# Patient Record
Sex: FEMALE | Race: OTHER | Employment: FULL TIME | URBAN - METROPOLITAN AREA
[De-identification: names, ages, dates, MRNs, and addresses within clinical notes are randomized per-mention and may not be internally consistent; named-entity substitution may affect disease eponyms.]

---

## 2017-01-30 ENCOUNTER — TELEPHONE (OUTPATIENT)
Dept: OBGYN CLINIC | Facility: CLINIC | Age: 27
End: 2017-01-30

## 2017-01-30 NOTE — TELEPHONE ENCOUNTER
Left message on answering machine to call back. Pt should schedule appt with Liborio Rogers within next few days.

## 2017-01-30 NOTE — TELEPHONE ENCOUNTER
Pt c/o menses lasting greater than one month; changing tampon q 2 hours during the day. Pt c/o mild dizziness today; denies chest pain, shortness of breath or headache. Pt states menses has always been irregular; had IUD, but fell out in April 2016.   Not

## 2017-02-02 ENCOUNTER — LAB ENCOUNTER (OUTPATIENT)
Dept: LAB | Age: 27
End: 2017-02-02
Attending: NURSE PRACTITIONER
Payer: COMMERCIAL

## 2017-02-02 ENCOUNTER — OFFICE VISIT (OUTPATIENT)
Dept: OBGYN CLINIC | Facility: CLINIC | Age: 27
End: 2017-02-02

## 2017-02-02 VITALS
HEIGHT: 63 IN | DIASTOLIC BLOOD PRESSURE: 74 MMHG | HEART RATE: 60 BPM | SYSTOLIC BLOOD PRESSURE: 102 MMHG | BODY MASS INDEX: 31.54 KG/M2 | WEIGHT: 178 LBS

## 2017-02-02 DIAGNOSIS — N93.8 DYSFUNCTIONAL UTERINE BLEEDING: ICD-10-CM

## 2017-02-02 DIAGNOSIS — N93.8 DYSFUNCTIONAL UTERINE BLEEDING: Primary | ICD-10-CM

## 2017-02-02 LAB
BASOPHILS # BLD AUTO: 0.02 X10(3) UL (ref 0–0.1)
BASOPHILS NFR BLD AUTO: 0.2 %
EOSINOPHIL # BLD AUTO: 0.08 X10(3) UL (ref 0–0.3)
EOSINOPHIL NFR BLD AUTO: 0.9 %
ERYTHROCYTE [DISTWIDTH] IN BLOOD BY AUTOMATED COUNT: 12.5 % (ref 11.5–16)
FREE T4: 1.2 NG/DL (ref 0.9–1.8)
HCG QUANTITATIVE: <1 MIU/ML (ref ?–1)
HCT VFR BLD AUTO: 39.4 % (ref 34–50)
HGB BLD-MCNC: 13.3 G/DL (ref 12–16)
IMMATURE GRANULOCYTE COUNT: 0.03 X10(3) UL (ref 0–1)
IMMATURE GRANULOCYTE RATIO %: 0.3 %
LYMPHOCYTES # BLD AUTO: 2.26 X10(3) UL (ref 0.9–4)
LYMPHOCYTES NFR BLD AUTO: 24 %
MCH RBC QN AUTO: 30.3 PG (ref 27–33.2)
MCHC RBC AUTO-ENTMCNC: 33.8 G/DL (ref 31–37)
MCV RBC AUTO: 89.7 FL (ref 81–100)
MONOCYTES # BLD AUTO: 0.71 X10(3) UL (ref 0.1–0.6)
MONOCYTES NFR BLD AUTO: 7.5 %
NEUTROPHIL ABS PRELIM: 6.31 X10 (3) UL (ref 1.3–6.7)
NEUTROPHILS # BLD AUTO: 6.31 X10(3) UL (ref 1.3–6.7)
NEUTROPHILS NFR BLD AUTO: 67.1 %
PLATELET # BLD AUTO: 305 10(3)UL (ref 150–450)
RBC # BLD AUTO: 4.39 X10(6)UL (ref 3.8–5.1)
RED CELL DISTRIBUTION WIDTH-SD: 41.7 FL (ref 35.1–46.3)
TSI SER-ACNC: 0.65 MIU/ML (ref 0.35–5.5)
WBC # BLD AUTO: 9.4 X10(3) UL (ref 4–13)

## 2017-02-02 PROCEDURE — 36415 COLL VENOUS BLD VENIPUNCTURE: CPT

## 2017-02-02 PROCEDURE — 84443 ASSAY THYROID STIM HORMONE: CPT

## 2017-02-02 PROCEDURE — 84439 ASSAY OF FREE THYROXINE: CPT

## 2017-02-02 PROCEDURE — 85025 COMPLETE CBC W/AUTO DIFF WBC: CPT

## 2017-02-02 PROCEDURE — 84702 CHORIONIC GONADOTROPIN TEST: CPT

## 2017-02-02 PROCEDURE — 99213 OFFICE O/P EST LOW 20 MIN: CPT | Performed by: NURSE PRACTITIONER

## 2017-02-02 RX ORDER — MEDROXYPROGESTERONE ACETATE 10 MG/1
10 TABLET ORAL DAILY
Qty: 15 TABLET | Refills: 0 | Status: SHIPPED | OUTPATIENT
Start: 2017-02-02 | End: 2017-02-17

## 2017-02-03 NOTE — PROGRESS NOTES
Quick Note:    Left message on patients confidential voice mail all labs are normal. If patient symptomatic, should call PCP for further work up as no anemia noted on labs. Will see patient back for follow- up appointment or sooner if needed.   ______

## 2017-02-03 NOTE — PROGRESS NOTES
S:  Patient presents with 8 weeks of continuous bleeding, fluctuating in flow. No significant pain to note. Patient had 1 previous episode of prolonged bleeding in 2015, which was around the same time diagnosed with hypothyroidism.  Menses has been irregula

## 2017-05-15 PROBLEM — R93.89 THYROID WITH HETEROGENEOUS ECHOTEXTURE DETERMINED BY ULTRASOUND: Status: ACTIVE | Noted: 2017-05-15

## 2017-07-25 ENCOUNTER — TELEPHONE (OUTPATIENT)
Dept: INTERNAL MEDICINE CLINIC | Facility: CLINIC | Age: 27
End: 2017-07-25

## 2017-07-25 DIAGNOSIS — Z11.1 SCREENING-PULMONARY TB: Primary | ICD-10-CM

## 2017-07-25 NOTE — TELEPHONE ENCOUNTER
Order is in. Jyl Aid. Reanna Marcano MD  Diplomate, American Board of Internal Medicine  [facility]  [address], [address], [facility], [address] [place] [address]  T: [phone]; F: Emily 5

## 2017-07-26 ENCOUNTER — NURSE ONLY (OUTPATIENT)
Dept: INTERNAL MEDICINE CLINIC | Facility: CLINIC | Age: 27
End: 2017-07-26

## 2017-07-26 PROCEDURE — 86580 TB INTRADERMAL TEST: CPT | Performed by: INTERNAL MEDICINE

## 2017-07-26 NOTE — PROGRESS NOTES
Pt here for TB test for school. No form given just need to print out Immunization record after reading results.

## 2017-07-28 ENCOUNTER — NURSE ONLY (OUTPATIENT)
Dept: INTERNAL MEDICINE CLINIC | Facility: CLINIC | Age: 27
End: 2017-07-28

## 2017-07-28 LAB — INDURATION (): 0 MM (ref 0–11)

## 2017-08-03 ENCOUNTER — OFFICE VISIT (OUTPATIENT)
Dept: FAMILY MEDICINE CLINIC | Facility: CLINIC | Age: 27
End: 2017-08-03

## 2017-08-03 DIAGNOSIS — Z11.1 PPD SCREENING TEST: Primary | ICD-10-CM

## 2017-08-03 PROCEDURE — 86580 TB INTRADERMAL TEST: CPT | Performed by: PHYSICIAN ASSISTANT

## 2017-08-03 NOTE — PROGRESS NOTES
Cy Middleton is a 33year old female who presents for TB testing. TUBERCULOSIS SCREENING QUESTIONNAIRE    · Live vaccines in the past month? no  · Any steroid medication in the past month? no  · History of BCG vaccine?     no  · If female, are you

## 2017-08-06 ENCOUNTER — OFFICE VISIT (OUTPATIENT)
Dept: FAMILY MEDICINE CLINIC | Facility: CLINIC | Age: 27
End: 2017-08-06

## 2017-08-06 DIAGNOSIS — Z11.1 ENCOUNTER FOR PPD SKIN TEST READING: Primary | ICD-10-CM

## 2017-08-06 LAB — INDURATION (): 0 MM (ref 0–11)

## 2017-08-06 NOTE — PROGRESS NOTES
08/06/17    Shawanda Hernandez  6/23/1990        This document is to verify Tito Millan had a TB skin test preformed and the results were: negative.     Date given: 08/03/2017  Date read: 08/06/17

## 2017-08-09 ENCOUNTER — HOSPITAL ENCOUNTER (OUTPATIENT)
Dept: ULTRASOUND IMAGING | Age: 27
Discharge: HOME | End: 2017-08-09
Attending: NURSE PRACTITIONER
Payer: COMMERCIAL

## 2017-08-09 ENCOUNTER — OFFICE VISIT (OUTPATIENT)
Dept: OBGYN CLINIC | Facility: CLINIC | Age: 27
End: 2017-08-09

## 2017-08-09 ENCOUNTER — TELEPHONE (OUTPATIENT)
Dept: OBGYN CLINIC | Facility: CLINIC | Age: 27
End: 2017-08-09

## 2017-08-09 VITALS
HEIGHT: 63 IN | WEIGHT: 176 LBS | SYSTOLIC BLOOD PRESSURE: 100 MMHG | DIASTOLIC BLOOD PRESSURE: 62 MMHG | BODY MASS INDEX: 31.18 KG/M2

## 2017-08-09 DIAGNOSIS — N92.6 IRREGULAR MENSES: ICD-10-CM

## 2017-08-09 DIAGNOSIS — Z13.220 LIPID SCREENING: ICD-10-CM

## 2017-08-09 DIAGNOSIS — L68.0 HIRSUTISM: ICD-10-CM

## 2017-08-09 DIAGNOSIS — N93.8 DYSFUNCTIONAL UTERINE BLEEDING: ICD-10-CM

## 2017-08-09 DIAGNOSIS — Z01.419 WELL WOMAN EXAM WITH ROUTINE GYNECOLOGICAL EXAM: Primary | ICD-10-CM

## 2017-08-09 PROCEDURE — 76830 TRANSVAGINAL US NON-OB: CPT | Performed by: NURSE PRACTITIONER

## 2017-08-09 PROCEDURE — 99395 PREV VISIT EST AGE 18-39: CPT | Performed by: NURSE PRACTITIONER

## 2017-08-09 PROCEDURE — 76856 US EXAM PELVIC COMPLETE: CPT | Performed by: NURSE PRACTITIONER

## 2017-08-09 NOTE — PROGRESS NOTES
Here for Routine Annual Exam  No concerns or questions. Menses continue to be irregular, skip them and had a 2 week cycle in June. Interested in work up, never did US as recommended in February. Contraception- none at this time.   Does note increased acne

## 2017-09-14 ENCOUNTER — LAB ENCOUNTER (OUTPATIENT)
Dept: LAB | Age: 27
End: 2017-09-14
Attending: NURSE PRACTITIONER
Payer: COMMERCIAL

## 2017-09-14 DIAGNOSIS — Z13.220 LIPID SCREENING: ICD-10-CM

## 2017-09-14 DIAGNOSIS — L68.0 HIRSUTISM: ICD-10-CM

## 2017-09-14 DIAGNOSIS — N92.6 IRREGULAR MENSES: ICD-10-CM

## 2017-09-14 LAB
ALBUMIN SERPL-MCNC: 3.9 G/DL (ref 3.5–4.8)
ALP LIVER SERPL-CCNC: 74 U/L (ref 37–98)
ALT SERPL-CCNC: 37 U/L (ref 14–54)
AST SERPL-CCNC: 20 U/L (ref 15–41)
BILIRUB SERPL-MCNC: 0.5 MG/DL (ref 0.1–2)
BUN BLD-MCNC: 9 MG/DL (ref 8–20)
CALCIUM BLD-MCNC: 8.8 MG/DL (ref 8.3–10.3)
CHLORIDE: 107 MMOL/L (ref 101–111)
CHOLEST SMN-MCNC: 175 MG/DL (ref ?–200)
CO2: 24 MMOL/L (ref 22–32)
CREAT BLD-MCNC: 0.69 MG/DL (ref 0.55–1.02)
ESTRADIOL: 48.8 PG/ML
FSH: 6.6 MIU/ML
GLUCOSE BLD-MCNC: 85 MG/DL (ref 70–99)
HDLC SERPL-MCNC: 40 MG/DL (ref 45–?)
HDLC SERPL: 4.38 {RATIO} (ref ?–4.44)
INSULIN: 20.9 MU/L (ref 1.7–31)
LDLC SERPL CALC-MCNC: 113 MG/DL (ref ?–130)
LDLC SERPL-MCNC: 22 MG/DL (ref 5–40)
LH: 7.6 MIU/ML
M PROTEIN MFR SERPL ELPH: 7.9 G/DL (ref 6.1–8.3)
NONHDLC SERPL-MCNC: 135 MG/DL (ref ?–130)
POTASSIUM SERPL-SCNC: 3.8 MMOL/L (ref 3.6–5.1)
PROLACTIN: 20.8 NG/ML
SODIUM SERPL-SCNC: 139 MMOL/L (ref 136–144)
TRIGLYCERIDES: 112 MG/DL (ref ?–150)

## 2017-09-14 PROCEDURE — 80061 LIPID PANEL: CPT

## 2017-09-14 PROCEDURE — 36415 COLL VENOUS BLD VENIPUNCTURE: CPT

## 2017-09-14 PROCEDURE — 83002 ASSAY OF GONADOTROPIN (LH): CPT

## 2017-09-14 PROCEDURE — 82627 DEHYDROEPIANDROSTERONE: CPT

## 2017-09-14 PROCEDURE — 83525 ASSAY OF INSULIN: CPT

## 2017-09-14 PROCEDURE — 84402 ASSAY OF FREE TESTOSTERONE: CPT

## 2017-09-14 PROCEDURE — 82670 ASSAY OF TOTAL ESTRADIOL: CPT

## 2017-09-14 PROCEDURE — 84403 ASSAY OF TOTAL TESTOSTERONE: CPT

## 2017-09-14 PROCEDURE — 83001 ASSAY OF GONADOTROPIN (FSH): CPT

## 2017-09-14 PROCEDURE — 80053 COMPREHEN METABOLIC PANEL: CPT

## 2017-09-14 PROCEDURE — 84146 ASSAY OF PROLACTIN: CPT

## 2017-09-16 LAB — DHEA SULFATE, SERUM: 274 UG/DL

## 2017-09-17 LAB
SEX HORMONE BINDING GLOBULIN: 41 NMOL/L
TESTOSTERONE -MS, BIOAVAILAB: 14.3 NG/DL
TESTOSTERONE, -MS/MS: 36 NG/DL
TESTOSTERONE, FREE -MS/MS: 5.4 PG/ML

## 2019-01-09 ENCOUNTER — OFFICE VISIT (OUTPATIENT)
Dept: INTERNAL MEDICINE CLINIC | Facility: CLINIC | Age: 29
End: 2019-01-09
Payer: COMMERCIAL

## 2019-01-09 ENCOUNTER — LAB ENCOUNTER (OUTPATIENT)
Dept: LAB | Age: 29
End: 2019-01-09
Attending: INTERNAL MEDICINE
Payer: COMMERCIAL

## 2019-01-09 VITALS
BODY MASS INDEX: 30.36 KG/M2 | TEMPERATURE: 98 F | WEIGHT: 173.5 LBS | HEIGHT: 63.5 IN | DIASTOLIC BLOOD PRESSURE: 76 MMHG | SYSTOLIC BLOOD PRESSURE: 114 MMHG | HEART RATE: 80 BPM | RESPIRATION RATE: 16 BRPM

## 2019-01-09 DIAGNOSIS — E06.3 CHRONIC LYMPHOCYTIC THYROIDITIS: ICD-10-CM

## 2019-01-09 DIAGNOSIS — R00.2 PALPITATIONS: Primary | ICD-10-CM

## 2019-01-09 DIAGNOSIS — L50.0 ALLERGIC URTICARIA: ICD-10-CM

## 2019-01-09 DIAGNOSIS — R00.2 PALPITATIONS: ICD-10-CM

## 2019-01-09 DIAGNOSIS — E06.3 HYPOTHYROIDISM, ACQUIRED, AUTOIMMUNE: ICD-10-CM

## 2019-01-09 LAB
ALBUMIN SERPL-MCNC: 3.9 G/DL (ref 3.1–4.5)
ALBUMIN/GLOB SERPL: 1.1 {RATIO} (ref 1–2)
ALP LIVER SERPL-CCNC: 77 U/L (ref 37–98)
ALT SERPL-CCNC: 38 U/L (ref 14–54)
ANION GAP SERPL CALC-SCNC: 6 MMOL/L (ref 0–18)
AST SERPL-CCNC: 25 U/L (ref 15–41)
BASOPHILS # BLD AUTO: 0.02 X10(3) UL (ref 0–0.1)
BASOPHILS NFR BLD AUTO: 0.3 %
BILIRUB SERPL-MCNC: 0.4 MG/DL (ref 0.1–2)
BUN BLD-MCNC: 8 MG/DL (ref 8–20)
BUN/CREAT SERPL: 11.3 (ref 10–20)
CALCIUM BLD-MCNC: 8.9 MG/DL (ref 8.3–10.3)
CHLORIDE SERPL-SCNC: 109 MMOL/L (ref 101–111)
CO2 SERPL-SCNC: 28 MMOL/L (ref 22–32)
CREAT BLD-MCNC: 0.71 MG/DL (ref 0.55–1.02)
EOSINOPHIL # BLD AUTO: 0.1 X10(3) UL (ref 0–0.3)
EOSINOPHIL NFR BLD AUTO: 1.4 %
ERYTHROCYTE [DISTWIDTH] IN BLOOD BY AUTOMATED COUNT: 12.8 % (ref 11.5–16)
GLOBULIN PLAS-MCNC: 3.6 G/DL (ref 2.8–4.4)
GLUCOSE BLD-MCNC: 111 MG/DL (ref 70–99)
HCT VFR BLD AUTO: 39.5 % (ref 34–50)
HGB BLD-MCNC: 13.5 G/DL (ref 12–16)
IMMATURE GRANULOCYTE COUNT: 0.01 X10(3) UL (ref 0–1)
IMMATURE GRANULOCYTE RATIO %: 0.1 %
LYMPHOCYTES # BLD AUTO: 2.36 X10(3) UL (ref 0.9–4)
LYMPHOCYTES NFR BLD AUTO: 32.6 %
M PROTEIN MFR SERPL ELPH: 7.5 G/DL (ref 6.4–8.2)
MCH RBC QN AUTO: 30.2 PG (ref 27–33.2)
MCHC RBC AUTO-ENTMCNC: 34.2 G/DL (ref 31–37)
MCV RBC AUTO: 88.4 FL (ref 81–100)
MONOCYTES # BLD AUTO: 0.62 X10(3) UL (ref 0.1–1)
MONOCYTES NFR BLD AUTO: 8.6 %
NEUTROPHIL ABS PRELIM: 4.12 X10 (3) UL (ref 1.3–6.7)
NEUTROPHILS # BLD AUTO: 4.12 X10(3) UL (ref 1.3–6.7)
NEUTROPHILS NFR BLD AUTO: 57 %
OSMOLALITY SERPL CALC.SUM OF ELEC: 295 MOSM/KG (ref 275–295)
PLATELET # BLD AUTO: 304 10(3)UL (ref 150–450)
POTASSIUM SERPL-SCNC: 4.1 MMOL/L (ref 3.6–5.1)
RBC # BLD AUTO: 4.47 X10(6)UL (ref 3.8–5.1)
RED CELL DISTRIBUTION WIDTH-SD: 41 FL (ref 35.1–46.3)
SODIUM SERPL-SCNC: 143 MMOL/L (ref 136–144)
TSI SER-ACNC: 0.85 MIU/ML (ref 0.35–5.5)
WBC # BLD AUTO: 7.2 X10(3) UL (ref 4–13)

## 2019-01-09 PROCEDURE — 36415 COLL VENOUS BLD VENIPUNCTURE: CPT | Performed by: INTERNAL MEDICINE

## 2019-01-09 PROCEDURE — 99214 OFFICE O/P EST MOD 30 MIN: CPT | Performed by: INTERNAL MEDICINE

## 2019-01-09 PROCEDURE — 80050 GENERAL HEALTH PANEL: CPT | Performed by: INTERNAL MEDICINE

## 2019-01-09 PROCEDURE — 93000 ELECTROCARDIOGRAM COMPLETE: CPT | Performed by: INTERNAL MEDICINE

## 2019-01-09 NOTE — PROGRESS NOTES
Imani Esqueda is a 33year old female. HPI:   Patient presents with:  Palpitations: x 1 week and a half. Last time this happened was when she was diagnosed with Hashimoto's  Patient presents with palpitations for the past 1.5 weeks.   Palpitations are oc smoked. she has never used smokeless tobacco. She reports that she does not drink alcohol or use drugs.   Wt Readings from Last 6 Encounters:  01/09/19 : 173 lb 8 oz  06/08/18 : 177 lb 11.2 oz  08/09/17 : 176 lb  05/15/17 : 176 lb  02/02/17 : 178 lb  10/29/ GYNECOLOGY)  The patient indicates understanding of these issues and agrees to the plan. The patient is asked to return to clinic in 6-12 months with Dr. Edgar Hadley MD for follow up on chronic issues, or earlier if acute issues arise.     Jennifer Everett,

## 2019-01-09 NOTE — PATIENT INSTRUCTIONS
- EKG was normal  - Get blood work done today  - If blood work is unremarkable, we will have you schedule a 48 hour Holter monitor placement ([phone]). It was a pleasure seeing you in the clinic today.   Thank you for choosing the [facility]

## 2019-03-08 ENCOUNTER — EMPLOYEE HEALTH (OUTPATIENT)
Dept: OCCUPATIONAL MEDICINE | Age: 29
End: 2019-03-08
Attending: PHYSICIAN ASSISTANT

## 2019-10-16 ENCOUNTER — OFFICE VISIT (OUTPATIENT)
Dept: INTERNAL MEDICINE CLINIC | Facility: CLINIC | Age: 29
End: 2019-10-16
Payer: COMMERCIAL

## 2019-10-16 VITALS
TEMPERATURE: 98 F | HEART RATE: 80 BPM | HEIGHT: 63.5 IN | DIASTOLIC BLOOD PRESSURE: 70 MMHG | WEIGHT: 168.75 LBS | RESPIRATION RATE: 16 BRPM | BODY MASS INDEX: 29.53 KG/M2 | SYSTOLIC BLOOD PRESSURE: 110 MMHG

## 2019-10-16 DIAGNOSIS — E06.3 HYPOTHYROIDISM, ACQUIRED, AUTOIMMUNE: ICD-10-CM

## 2019-10-16 DIAGNOSIS — L50.0 ALLERGIC URTICARIA: ICD-10-CM

## 2019-10-16 DIAGNOSIS — Z00.00 ENCOUNTER FOR PREVENTATIVE ADULT HEALTH CARE EXAMINATION: Primary | ICD-10-CM

## 2019-10-16 DIAGNOSIS — E06.3 CHRONIC LYMPHOCYTIC THYROIDITIS: ICD-10-CM

## 2019-10-16 PROCEDURE — 99395 PREV VISIT EST AGE 18-39: CPT | Performed by: INTERNAL MEDICINE

## 2019-10-16 RX ORDER — LEVOTHYROXINE SODIUM 0.07 MG/1
75 TABLET ORAL
Qty: 90 TABLET | Refills: 0 | Status: SHIPPED | OUTPATIENT
Start: 2019-10-16 | End: 2020-01-08

## 2019-10-16 NOTE — PROGRESS NOTES
Chuck Arndt is a 30year old female. HPI:   Patient presents with:  Physical    Patient presents for CPX/wellness examination. Diet:  Reasonable. Trying to eat healthier. Exercise: Not a lot of exercise. Vision: Wears glasses.   Last eye exam was drugs.   Wt Readings from Last 6 Encounters:  10/16/19 : 168 lb 12 oz (76.5 kg)  09/09/19 : 168 lb (76.2 kg)  01/09/19 : 173 lb 8 oz (78.7 kg)  06/08/18 : 177 lb 11.2 oz (80.6 kg)  08/09/17 : 176 lb (79.8 kg)  05/15/17 : 176 lb (79.8 kg)    EXAM:   /7 list and move to medical history.     Patient Care Team:  Bubba Pillai MD as PCP - General (Internal Medicine)  ROSSY Peter as Consulting Physician (Nurse Practitioner Women's Health)  The patient indicates understanding of these issues and agree

## 2019-10-16 NOTE — PATIENT INSTRUCTIONS
- Get blood work done when fasting (at least 8 hours, water and medications only). Our lab is open Monday - Friday, 7 AM - 4:15 PM.  Make sure to get our blood work and thyroid blood work done. - Follow up with gynecology as scheduled.   - Try to exercise

## 2019-10-18 ENCOUNTER — LAB ENCOUNTER (OUTPATIENT)
Dept: LAB | Age: 29
End: 2019-10-18
Attending: NURSE PRACTITIONER
Payer: COMMERCIAL

## 2019-10-18 DIAGNOSIS — E06.3 HYPOTHYROIDISM, ACQUIRED, AUTOIMMUNE: ICD-10-CM

## 2019-10-18 DIAGNOSIS — Z00.00 ENCOUNTER FOR PREVENTATIVE ADULT HEALTH CARE EXAMINATION: ICD-10-CM

## 2019-10-18 PROCEDURE — 83036 HEMOGLOBIN GLYCOSYLATED A1C: CPT | Performed by: INTERNAL MEDICINE

## 2019-10-18 PROCEDURE — 80053 COMPREHEN METABOLIC PANEL: CPT | Performed by: INTERNAL MEDICINE

## 2019-10-18 PROCEDURE — 85025 COMPLETE CBC W/AUTO DIFF WBC: CPT | Performed by: INTERNAL MEDICINE

## 2019-10-18 PROCEDURE — 36415 COLL VENOUS BLD VENIPUNCTURE: CPT | Performed by: INTERNAL MEDICINE

## 2019-10-18 PROCEDURE — 80061 LIPID PANEL: CPT | Performed by: INTERNAL MEDICINE

## 2019-10-28 ENCOUNTER — OFFICE VISIT (OUTPATIENT)
Dept: OBGYN CLINIC | Facility: CLINIC | Age: 29
End: 2019-10-28
Payer: COMMERCIAL

## 2019-10-28 VITALS
BODY MASS INDEX: 29.75 KG/M2 | HEIGHT: 63.5 IN | SYSTOLIC BLOOD PRESSURE: 114 MMHG | WEIGHT: 170 LBS | DIASTOLIC BLOOD PRESSURE: 62 MMHG

## 2019-10-28 DIAGNOSIS — Z01.419 WELL WOMAN EXAM WITH ROUTINE GYNECOLOGICAL EXAM: Primary | ICD-10-CM

## 2019-10-28 DIAGNOSIS — R10.2 PELVIC PAIN: ICD-10-CM

## 2019-10-28 PROCEDURE — 99395 PREV VISIT EST AGE 18-39: CPT | Performed by: NURSE PRACTITIONER

## 2019-10-28 NOTE — PROGRESS NOTES
Here for Routine Annual Exam  No concerns or questions. Menses are irregular, no significant change since last visit. Contraception- none noted. Some right sided pelvic pain. No C/O     ROS: No Cardiac, Respiratory, GI,  or Neurological symptoms.

## 2020-01-03 ENCOUNTER — APPOINTMENT (OUTPATIENT)
Dept: LAB | Age: 30
End: 2020-01-03
Attending: NURSE PRACTITIONER
Payer: COMMERCIAL

## 2020-01-03 DIAGNOSIS — E06.3 HYPOTHYROIDISM, ACQUIRED, AUTOIMMUNE: ICD-10-CM

## 2020-01-03 LAB
T4 FREE SERPL-MCNC: 0.9 NG/DL (ref 0.8–1.7)
TSI SER-ACNC: 0.86 MIU/ML (ref 0.36–3.74)

## 2020-01-03 PROCEDURE — 36415 COLL VENOUS BLD VENIPUNCTURE: CPT

## 2020-01-03 PROCEDURE — 84443 ASSAY THYROID STIM HORMONE: CPT

## 2020-01-03 PROCEDURE — 84439 ASSAY OF FREE THYROXINE: CPT

## 2020-04-16 ENCOUNTER — TELEPHONE (OUTPATIENT)
Dept: INTERNAL MEDICINE CLINIC | Facility: CLINIC | Age: 30
End: 2020-04-16

## 2020-04-16 RX ORDER — AMOXICILLIN AND CLAVULANATE POTASSIUM 875; 125 MG/1; MG/1
1 TABLET, FILM COATED ORAL 2 TIMES DAILY
Qty: 20 TABLET | Refills: 0 | Status: SHIPPED | OUTPATIENT
Start: 2020-04-16 | End: 2020-04-26

## 2020-04-16 NOTE — TELEPHONE ENCOUNTER
Pt has c/o possible sinus infection. Onset of symptoms? 2 days  Any nasal or chest congestion? Yes  Any pressure or severe headaches? Yes, frontal facial part, by eyes and cheeks. Any mucous (color)? No  Muscle or joint pain? No  Diarrhea?  No  Abdomin

## 2020-04-16 NOTE — TELEPHONE ENCOUNTER
Patient called stating she started with a headache yesterday, pressure on face, dripping behind the throat, no fever    - Insurance information confirmed/updated  - Patient informed that they may receive a call from a blocked/unavailable number.  - Patient

## 2020-04-16 NOTE — TELEPHONE ENCOUNTER
Prescription for Augmentin sent to [facility], she should use steroid nasal sprays such as Flonase/fluticasone twice daily for the next week as well, she should let us know if she is not better in 1 week or if she develops fevers, shortness of breath, body

## 2020-09-17 ENCOUNTER — OFFICE VISIT (OUTPATIENT)
Dept: INTERNAL MEDICINE CLINIC | Facility: CLINIC | Age: 30
End: 2020-09-17
Payer: COMMERCIAL

## 2020-09-17 VITALS
HEIGHT: 63.5 IN | HEART RATE: 64 BPM | TEMPERATURE: 97 F | WEIGHT: 172.19 LBS | DIASTOLIC BLOOD PRESSURE: 70 MMHG | RESPIRATION RATE: 16 BRPM | SYSTOLIC BLOOD PRESSURE: 100 MMHG | BODY MASS INDEX: 30.13 KG/M2

## 2020-09-17 DIAGNOSIS — G89.29 CHRONIC BILATERAL LOW BACK PAIN WITHOUT SCIATICA: ICD-10-CM

## 2020-09-17 DIAGNOSIS — Z00.00 ENCOUNTER FOR PREVENTATIVE ADULT HEALTH CARE EXAMINATION: Primary | ICD-10-CM

## 2020-09-17 DIAGNOSIS — H61.23 BILATERAL IMPACTED CERUMEN: ICD-10-CM

## 2020-09-17 DIAGNOSIS — M54.50 CHRONIC BILATERAL LOW BACK PAIN WITHOUT SCIATICA: ICD-10-CM

## 2020-09-17 DIAGNOSIS — M41.9 SCOLIOSIS, UNSPECIFIED SCOLIOSIS TYPE, UNSPECIFIED SPINAL REGION: ICD-10-CM

## 2020-09-17 DIAGNOSIS — E06.3 HYPOTHYROIDISM, ACQUIRED, AUTOIMMUNE: ICD-10-CM

## 2020-09-17 DIAGNOSIS — M54.9 UPPER BACK PAIN: ICD-10-CM

## 2020-09-17 PROCEDURE — 99395 PREV VISIT EST AGE 18-39: CPT | Performed by: INTERNAL MEDICINE

## 2020-09-17 PROCEDURE — 3074F SYST BP LT 130 MM HG: CPT | Performed by: INTERNAL MEDICINE

## 2020-09-17 PROCEDURE — 3078F DIAST BP <80 MM HG: CPT | Performed by: INTERNAL MEDICINE

## 2020-09-17 PROCEDURE — 3008F BODY MASS INDEX DOCD: CPT | Performed by: INTERNAL MEDICINE

## 2020-09-17 NOTE — PROGRESS NOTES
Nancy Mosquera is a 35year old female. HPI:   Patient presents with:  Physical  Health Maintenance: Pt has appt with Gyne in 11/2020    Patient presents for CPX/wellness examination. Diet: Has been fluctuating with healthy and unhealthy eating.   Up and grandmother; Stroke in her paternal grandfather; Thyroid disease in an other family member. Social:  reports that she has never smoked. She has never used smokeless tobacco. She reports that she does not drink alcohol or use drugs.   Wt Readings from Last fullness for the past month or two. She has impacted cerumen in both ears. Both ears flushed. If symptoms persist, will have patient follow up with ENT. Information provided for [facility] ENT.     4. Scoliosis, unspecified scoliosis type, unspecified s

## 2020-09-23 ENCOUNTER — LABORATORY ENCOUNTER (OUTPATIENT)
Dept: LAB | Age: 30
End: 2020-09-23
Attending: INTERNAL MEDICINE
Payer: COMMERCIAL

## 2020-09-23 DIAGNOSIS — E06.3 HYPOTHYROIDISM, ACQUIRED, AUTOIMMUNE: ICD-10-CM

## 2020-09-23 DIAGNOSIS — Z00.00 ENCOUNTER FOR PREVENTATIVE ADULT HEALTH CARE EXAMINATION: ICD-10-CM

## 2020-09-23 LAB
ALBUMIN SERPL-MCNC: 3.8 G/DL (ref 3.4–5)
ALBUMIN/GLOB SERPL: 1 {RATIO} (ref 1–2)
ALP LIVER SERPL-CCNC: 55 U/L
ALT SERPL-CCNC: 37 U/L
ANION GAP SERPL CALC-SCNC: 3 MMOL/L (ref 0–18)
AST SERPL-CCNC: 20 U/L (ref 15–37)
BASOPHILS # BLD AUTO: 0.05 X10(3) UL (ref 0–0.2)
BASOPHILS NFR BLD AUTO: 0.7 %
BILIRUB SERPL-MCNC: 0.4 MG/DL (ref 0.1–2)
BUN BLD-MCNC: 12 MG/DL (ref 7–18)
BUN/CREAT SERPL: 15 (ref 10–20)
CALCIUM BLD-MCNC: 8.9 MG/DL (ref 8.5–10.1)
CHLORIDE SERPL-SCNC: 110 MMOL/L (ref 98–112)
CHOLEST SMN-MCNC: 173 MG/DL (ref ?–200)
CO2 SERPL-SCNC: 25 MMOL/L (ref 21–32)
CREAT BLD-MCNC: 0.8 MG/DL
DEPRECATED RDW RBC AUTO: 43.6 FL (ref 35.1–46.3)
EOSINOPHIL # BLD AUTO: 0.07 X10(3) UL (ref 0–0.7)
EOSINOPHIL NFR BLD AUTO: 1 %
ERYTHROCYTE [DISTWIDTH] IN BLOOD BY AUTOMATED COUNT: 12.9 % (ref 11–15)
GLOBULIN PLAS-MCNC: 3.8 G/DL (ref 2.8–4.4)
GLUCOSE BLD-MCNC: 88 MG/DL (ref 70–99)
HCT VFR BLD AUTO: 44 %
HDLC SERPL-MCNC: 37 MG/DL (ref 40–59)
HGB BLD-MCNC: 14.3 G/DL
IMM GRANULOCYTES # BLD AUTO: 0.02 X10(3) UL (ref 0–1)
IMM GRANULOCYTES NFR BLD: 0.3 %
LDLC SERPL CALC-MCNC: 121 MG/DL (ref ?–100)
LYMPHOCYTES # BLD AUTO: 2.55 X10(3) UL (ref 1–4)
LYMPHOCYTES NFR BLD AUTO: 36.3 %
M PROTEIN MFR SERPL ELPH: 7.6 G/DL (ref 6.4–8.2)
MCH RBC QN AUTO: 30 PG (ref 26–34)
MCHC RBC AUTO-ENTMCNC: 32.5 G/DL (ref 31–37)
MCV RBC AUTO: 92.2 FL
MONOCYTES # BLD AUTO: 0.65 X10(3) UL (ref 0.1–1)
MONOCYTES NFR BLD AUTO: 9.2 %
NEUTROPHILS # BLD AUTO: 3.69 X10 (3) UL (ref 1.5–7.7)
NEUTROPHILS # BLD AUTO: 3.69 X10(3) UL (ref 1.5–7.7)
NEUTROPHILS NFR BLD AUTO: 52.5 %
NONHDLC SERPL-MCNC: 136 MG/DL (ref ?–130)
OSMOLALITY SERPL CALC.SUM OF ELEC: 285 MOSM/KG (ref 275–295)
PATIENT FASTING Y/N/NP: YES
PATIENT FASTING Y/N/NP: YES
PLATELET # BLD AUTO: 295 10(3)UL (ref 150–450)
POTASSIUM SERPL-SCNC: 3.9 MMOL/L (ref 3.5–5.1)
RBC # BLD AUTO: 4.77 X10(6)UL
SODIUM SERPL-SCNC: 138 MMOL/L (ref 136–145)
T4 FREE SERPL-MCNC: 1.1 NG/DL (ref 0.8–1.7)
TRIGL SERPL-MCNC: 75 MG/DL (ref 30–149)
TSI SER-ACNC: 0.55 MIU/ML (ref 0.36–3.74)
VLDLC SERPL CALC-MCNC: 15 MG/DL (ref 0–30)
WBC # BLD AUTO: 7 X10(3) UL (ref 4–11)

## 2020-09-23 PROCEDURE — 80050 GENERAL HEALTH PANEL: CPT | Performed by: INTERNAL MEDICINE

## 2020-09-23 PROCEDURE — 36415 COLL VENOUS BLD VENIPUNCTURE: CPT | Performed by: INTERNAL MEDICINE

## 2020-09-23 PROCEDURE — 80061 LIPID PANEL: CPT | Performed by: INTERNAL MEDICINE

## 2020-09-23 PROCEDURE — 84439 ASSAY OF FREE THYROXINE: CPT | Performed by: INTERNAL MEDICINE

## 2020-10-02 ENCOUNTER — HOSPITAL ENCOUNTER (OUTPATIENT)
Dept: ULTRASOUND IMAGING | Age: 30
Discharge: HOME | End: 2020-10-02
Attending: NURSE PRACTITIONER
Payer: COMMERCIAL

## 2020-10-02 DIAGNOSIS — R10.2 PELVIC PAIN: ICD-10-CM

## 2020-10-02 PROCEDURE — 76856 US EXAM PELVIC COMPLETE: CPT | Performed by: NURSE PRACTITIONER

## 2020-10-02 PROCEDURE — 76830 TRANSVAGINAL US NON-OB: CPT | Performed by: NURSE PRACTITIONER

## 2020-10-21 PROBLEM — R93.89 THYROID WITH HETEROGENEOUS ECHOTEXTURE DETERMINED BY ULTRASOUND: Status: ACTIVE | Noted: 2020-10-21

## 2020-10-26 NOTE — PROGRESS NOTES
These results were discussed with the patient at their 10/21/2020 endo visit. Please refer to that note for details.

## 2020-11-05 ENCOUNTER — OFFICE VISIT (OUTPATIENT)
Dept: OBGYN CLINIC | Facility: CLINIC | Age: 30
End: 2020-11-05
Payer: COMMERCIAL

## 2020-11-05 VITALS
WEIGHT: 172.38 LBS | HEIGHT: 63.5 IN | BODY MASS INDEX: 30.16 KG/M2 | HEART RATE: 63 BPM | SYSTOLIC BLOOD PRESSURE: 112 MMHG | DIASTOLIC BLOOD PRESSURE: 62 MMHG

## 2020-11-05 DIAGNOSIS — Z12.4 CERVICAL CANCER SCREENING: ICD-10-CM

## 2020-11-05 DIAGNOSIS — Z01.419 WELL WOMAN EXAM WITH ROUTINE GYNECOLOGICAL EXAM: Primary | ICD-10-CM

## 2020-11-05 PROCEDURE — 3074F SYST BP LT 130 MM HG: CPT | Performed by: OBSTETRICS & GYNECOLOGY

## 2020-11-05 PROCEDURE — 3008F BODY MASS INDEX DOCD: CPT | Performed by: OBSTETRICS & GYNECOLOGY

## 2020-11-05 PROCEDURE — 88175 CYTOPATH C/V AUTO FLUID REDO: CPT | Performed by: OBSTETRICS & GYNECOLOGY

## 2020-11-05 PROCEDURE — 99395 PREV VISIT EST AGE 18-39: CPT | Performed by: OBSTETRICS & GYNECOLOGY

## 2020-11-05 PROCEDURE — 99072 ADDL SUPL MATRL&STAF TM PHE: CPT | Performed by: OBSTETRICS & GYNECOLOGY

## 2020-11-05 PROCEDURE — 3078F DIAST BP <80 MM HG: CPT | Performed by: OBSTETRICS & GYNECOLOGY

## 2020-11-05 NOTE — PROGRESS NOTES
OB/GYN H&P       CC: Patient presents with:  Physical: no c/o's or concerns      HPI: Imani Esqueda is a 35year old  here for [employer]    Her periods are irregular q 30-90 days, bleeds for 3-10 days. Does not see clots. No pelvic pain.    No pain with positives and negatives noted in the the HPI.     Objective:    /62   Pulse 63   Ht 63.5\"   Wt 172 lb 6.4 oz (78.2 kg)   LMP 10/26/2020 (Exact Date)   BMI 30.06 kg/m²   Physical Exam          Assessment/Plan:    Problem List Items Addressed This Visi

## 2020-11-05 NOTE — PROGRESS NOTES
OB/GYN H&P       CC: Patient presents with:  Physical: no c/o's or concerns      HPI: Holly Chahal is a 35year old  here for [employer]. Has irregular periods. Not very bothered by it. Not using BC   In a long term relationship.  No plans for pregn comprehensive 10 point review of systems was completed. Pertinent positives and negatives noted in the the HPI.     Objective:    /62   Pulse 63   Ht 63.5\"   Wt 172 lb 6.4 oz (78.2 kg)   LMP 10/26/2020 (Exact Date)   BMI 30.06 kg/m²   Physical Exam

## 2021-01-04 ENCOUNTER — TELEPHONE (OUTPATIENT)
Dept: OBGYN CLINIC | Facility: CLINIC | Age: 31
End: 2021-01-04

## 2021-01-04 NOTE — TELEPHONE ENCOUNTER
Patient calling to initiate prenatal care  LMP 10/26/20  Patient is 7-8 weeks on 2 weeks ago  Confirmation Ultrasound and Appointment scheduled on   Future Appointments   Date Time Provider Patrick Carranza   1/20/2021 11:15 AM [facility] GEMMA JACOBSEN [facility]

## 2021-01-04 NOTE — TELEPHONE ENCOUNTER
; taking PNV; takes Levothyroxine but not consistently until finding out about pregnancy. Pt will contact PCP or endocrinologist to discuss and have repeat labs. Pt feeling well; some fatigue and mild cramps.     Pt advised to keep appt as scheduled a

## 2021-01-13 ENCOUNTER — LAB ENCOUNTER (OUTPATIENT)
Dept: LAB | Age: 31
End: 2021-01-13
Attending: INTERNAL MEDICINE
Payer: COMMERCIAL

## 2021-01-13 DIAGNOSIS — Z34.90 PREGNANCY, UNSPECIFIED GESTATIONAL AGE: ICD-10-CM

## 2021-01-13 LAB
T4 FREE SERPL-MCNC: 1.1 NG/DL (ref 0.8–1.7)
TSI SER-ACNC: 0.56 MIU/ML (ref 0.36–3.74)

## 2021-01-13 PROCEDURE — 84439 ASSAY OF FREE THYROXINE: CPT

## 2021-01-13 PROCEDURE — 36415 COLL VENOUS BLD VENIPUNCTURE: CPT

## 2021-01-13 PROCEDURE — 84443 ASSAY THYROID STIM HORMONE: CPT

## 2021-01-20 ENCOUNTER — ULTRASOUND ENCOUNTER (OUTPATIENT)
Dept: OBGYN CLINIC | Facility: CLINIC | Age: 31
End: 2021-01-20
Payer: COMMERCIAL

## 2021-01-20 ENCOUNTER — OFFICE VISIT (OUTPATIENT)
Dept: OBGYN CLINIC | Facility: CLINIC | Age: 31
End: 2021-01-20
Payer: COMMERCIAL

## 2021-01-20 VITALS
HEIGHT: 63.5 IN | WEIGHT: 182 LBS | SYSTOLIC BLOOD PRESSURE: 112 MMHG | DIASTOLIC BLOOD PRESSURE: 66 MMHG | BODY MASS INDEX: 31.85 KG/M2

## 2021-01-20 DIAGNOSIS — N92.6 IRREGULAR MENSTRUAL CYCLE: ICD-10-CM

## 2021-01-20 DIAGNOSIS — Z32.01 PREGNANCY EXAMINATION OR TEST, POSITIVE RESULT: ICD-10-CM

## 2021-01-20 DIAGNOSIS — N91.1 SECONDARY AMENORRHEA: Primary | ICD-10-CM

## 2021-01-20 PROCEDURE — 99213 OFFICE O/P EST LOW 20 MIN: CPT | Performed by: OBSTETRICS & GYNECOLOGY

## 2021-01-20 PROCEDURE — 3008F BODY MASS INDEX DOCD: CPT | Performed by: OBSTETRICS & GYNECOLOGY

## 2021-01-20 PROCEDURE — 76830 TRANSVAGINAL US NON-OB: CPT | Performed by: OBSTETRICS & GYNECOLOGY

## 2021-01-20 PROCEDURE — 3074F SYST BP LT 130 MM HG: CPT | Performed by: OBSTETRICS & GYNECOLOGY

## 2021-01-20 PROCEDURE — 3078F DIAST BP <80 MM HG: CPT | Performed by: OBSTETRICS & GYNECOLOGY

## 2021-01-20 NOTE — PROGRESS NOTES
[facility]  Obstetrics and Gynecology    Subjective:     Alycia Amador is a 35year old  female presents with c/o secondary amenorrhea and positive pregnancy test. The patient was recommended to return for further evaluation and a pelvic of my ability at this time.       Return for NOB visit in 4 weeks    [facility], 01/20/21, 11:59 AM

## 2021-02-08 ENCOUNTER — TELEPHONE (OUTPATIENT)
Dept: OBGYN CLINIC | Facility: CLINIC | Age: 31
End: 2021-02-08

## 2021-02-08 ENCOUNTER — LAB ENCOUNTER (OUTPATIENT)
Dept: LAB | Facility: HOSPITAL | Age: 31
End: 2021-02-08
Attending: OBSTETRICS & GYNECOLOGY
Payer: COMMERCIAL

## 2021-02-08 DIAGNOSIS — Z3A.09 9 WEEKS GESTATION OF PREGNANCY: ICD-10-CM

## 2021-02-08 DIAGNOSIS — O20.9 BLEEDING IN EARLY PREGNANCY: ICD-10-CM

## 2021-02-08 DIAGNOSIS — O20.9 BLEEDING IN EARLY PREGNANCY: Primary | ICD-10-CM

## 2021-02-08 LAB
ANTIBODY SCREEN: NEGATIVE
BASOPHILS # BLD AUTO: 0.02 X10(3) UL (ref 0–0.2)
BASOPHILS NFR BLD AUTO: 0.2 %
DEPRECATED RDW RBC AUTO: 40.5 FL (ref 35.1–46.3)
EOSINOPHIL # BLD AUTO: 0.08 X10(3) UL (ref 0–0.7)
EOSINOPHIL NFR BLD AUTO: 0.9 %
ERYTHROCYTE [DISTWIDTH] IN BLOOD BY AUTOMATED COUNT: 12.3 % (ref 11–15)
HBV SURFACE AG SER-ACNC: <0.1 [IU]/L
HBV SURFACE AG SERPL QL IA: NONREACTIVE
HCT VFR BLD AUTO: 39.7 %
HGB BLD-MCNC: 13.7 G/DL
IMM GRANULOCYTES # BLD AUTO: 0.03 X10(3) UL (ref 0–1)
IMM GRANULOCYTES NFR BLD: 0.3 %
LYMPHOCYTES # BLD AUTO: 2.94 X10(3) UL (ref 1–4)
LYMPHOCYTES NFR BLD AUTO: 33.1 %
MCH RBC QN AUTO: 31.2 PG (ref 26–34)
MCHC RBC AUTO-ENTMCNC: 34.5 G/DL (ref 31–37)
MCV RBC AUTO: 90.4 FL
MONOCYTES # BLD AUTO: 0.57 X10(3) UL (ref 0.1–1)
MONOCYTES NFR BLD AUTO: 6.4 %
NEUTROPHILS # BLD AUTO: 5.25 X10 (3) UL (ref 1.5–7.7)
NEUTROPHILS # BLD AUTO: 5.25 X10(3) UL (ref 1.5–7.7)
NEUTROPHILS NFR BLD AUTO: 59.1 %
PLATELET # BLD AUTO: 267 10(3)UL (ref 150–450)
RBC # BLD AUTO: 4.39 X10(6)UL
RH BLOOD TYPE: POSITIVE
RUBV IGG SER QL: POSITIVE
RUBV IGG SER-ACNC: 219.5 IU/ML (ref 10–?)
T PALLIDUM AB SER QL IA: NONREACTIVE
WBC # BLD AUTO: 8.9 X10(3) UL (ref 4–11)

## 2021-02-08 PROCEDURE — 36415 COLL VENOUS BLD VENIPUNCTURE: CPT

## 2021-02-08 PROCEDURE — 85025 COMPLETE CBC W/AUTO DIFF WBC: CPT

## 2021-02-08 PROCEDURE — 87389 HIV-1 AG W/HIV-1&-2 AB AG IA: CPT

## 2021-02-08 PROCEDURE — 86850 RBC ANTIBODY SCREEN: CPT

## 2021-02-08 PROCEDURE — 86901 BLOOD TYPING SEROLOGIC RH(D): CPT

## 2021-02-08 PROCEDURE — 86900 BLOOD TYPING SEROLOGIC ABO: CPT

## 2021-02-08 PROCEDURE — 87340 HEPATITIS B SURFACE AG IA: CPT

## 2021-02-08 PROCEDURE — 86780 TREPONEMA PALLIDUM: CPT

## 2021-02-08 PROCEDURE — 86762 RUBELLA ANTIBODY: CPT

## 2021-02-08 NOTE — TELEPHONE ENCOUNTER
Per Dr. Alexadnra Herrera, will order prenatal labs and blood type. Orders placed    Contacted patient. Instructions provided for labs. Also advised to call back tomorrow if bleeding persists. Will have her come in for OV if continues.  Patient states understanding

## 2021-02-08 NOTE — TELEPHONE ENCOUNTER
G1/P 0 GA 9 2/7 wks (edc 9/11/21) patient complaining of vaginal bleeding that started 2 hours ago. Bright red in color. She is also experiencing low back pain. 4/10 on pain scale. Mild abd cramping- same as before. Last intercourse was yesterday morning.

## 2021-02-08 NOTE — TELEPHONE ENCOUNTER
Patient is having red bleeding. She says it not heavy but not light.    The bleeding started today  Please call to advise

## 2021-02-09 NOTE — TELEPHONE ENCOUNTER
Patient states her bleeding has decreased. Notified of blood type and encouraged to call if she starts bleeding like a period.

## 2021-02-12 ENCOUNTER — HOSPITAL ENCOUNTER (EMERGENCY)
Facility: HOSPITAL | Age: 31
Discharge: HOME | End: 2021-02-13
Attending: EMERGENCY MEDICINE
Payer: COMMERCIAL

## 2021-02-12 ENCOUNTER — APPOINTMENT (OUTPATIENT)
Dept: ULTRASOUND IMAGING | Facility: HOSPITAL | Age: 31
End: 2021-02-12
Attending: EMERGENCY MEDICINE
Payer: COMMERCIAL

## 2021-02-12 ENCOUNTER — MOBILE ENCOUNTER (OUTPATIENT)
Dept: OBGYN CLINIC | Facility: CLINIC | Age: 31
End: 2021-02-12

## 2021-02-12 DIAGNOSIS — O03.9 SPONTANEOUS ABORTION: Primary | ICD-10-CM

## 2021-02-12 LAB
ALBUMIN SERPL-MCNC: 3.9 G/DL (ref 3.4–5)
ALBUMIN/GLOB SERPL: 1 {RATIO} (ref 1–2)
ALP LIVER SERPL-CCNC: 57 U/L
ALT SERPL-CCNC: 34 U/L
ANION GAP SERPL CALC-SCNC: 6 MMOL/L (ref 0–18)
AST SERPL-CCNC: 16 U/L (ref 15–37)
B-HCG SERPL-ACNC: 5253 MIU/ML
BASOPHILS # BLD AUTO: 0.03 X10(3) UL (ref 0–0.2)
BASOPHILS NFR BLD AUTO: 0.3 %
BILIRUB SERPL-MCNC: 0.3 MG/DL (ref 0.1–2)
BUN BLD-MCNC: 11 MG/DL (ref 7–18)
BUN/CREAT SERPL: 17.7 (ref 10–20)
CALCIUM BLD-MCNC: 8.7 MG/DL (ref 8.5–10.1)
CHLORIDE SERPL-SCNC: 107 MMOL/L (ref 98–112)
CO2 SERPL-SCNC: 26 MMOL/L (ref 21–32)
CREAT BLD-MCNC: 0.62 MG/DL
DEPRECATED RDW RBC AUTO: 40.8 FL (ref 35.1–46.3)
EOSINOPHIL # BLD AUTO: 0.1 X10(3) UL (ref 0–0.7)
EOSINOPHIL NFR BLD AUTO: 0.9 %
ERYTHROCYTE [DISTWIDTH] IN BLOOD BY AUTOMATED COUNT: 12.5 % (ref 11–15)
GLOBULIN PLAS-MCNC: 3.8 G/DL (ref 2.8–4.4)
GLUCOSE BLD-MCNC: 97 MG/DL (ref 70–99)
HCT VFR BLD AUTO: 41.3 %
HGB BLD-MCNC: 14.3 G/DL
IMM GRANULOCYTES # BLD AUTO: 0.03 X10(3) UL (ref 0–1)
IMM GRANULOCYTES NFR BLD: 0.3 %
LYMPHOCYTES # BLD AUTO: 2.81 X10(3) UL (ref 1–4)
LYMPHOCYTES NFR BLD AUTO: 25.9 %
M PROTEIN MFR SERPL ELPH: 7.7 G/DL (ref 6.4–8.2)
MCH RBC QN AUTO: 31.1 PG (ref 26–34)
MCHC RBC AUTO-ENTMCNC: 34.6 G/DL (ref 31–37)
MCV RBC AUTO: 89.8 FL
MONOCYTES # BLD AUTO: 0.96 X10(3) UL (ref 0.1–1)
MONOCYTES NFR BLD AUTO: 8.9 %
NEUTROPHILS # BLD AUTO: 6.91 X10 (3) UL (ref 1.5–7.7)
NEUTROPHILS # BLD AUTO: 6.91 X10(3) UL (ref 1.5–7.7)
NEUTROPHILS NFR BLD AUTO: 63.7 %
OSMOLALITY SERPL CALC.SUM OF ELEC: 287 MOSM/KG (ref 275–295)
PLATELET # BLD AUTO: 264 10(3)UL (ref 150–450)
POTASSIUM SERPL-SCNC: 3.5 MMOL/L (ref 3.5–5.1)
RBC # BLD AUTO: 4.6 X10(6)UL
SODIUM SERPL-SCNC: 139 MMOL/L (ref 136–145)
WBC # BLD AUTO: 10.8 X10(3) UL (ref 4–11)

## 2021-02-12 PROCEDURE — 87086 URINE CULTURE/COLONY COUNT: CPT | Performed by: EMERGENCY MEDICINE

## 2021-02-12 PROCEDURE — 84702 CHORIONIC GONADOTROPIN TEST: CPT | Performed by: EMERGENCY MEDICINE

## 2021-02-12 PROCEDURE — 99285 EMERGENCY DEPT VISIT HI MDM: CPT

## 2021-02-12 PROCEDURE — 99284 EMERGENCY DEPT VISIT MOD MDM: CPT

## 2021-02-12 PROCEDURE — 76817 TRANSVAGINAL US OBSTETRIC: CPT | Performed by: EMERGENCY MEDICINE

## 2021-02-12 PROCEDURE — 85025 COMPLETE CBC W/AUTO DIFF WBC: CPT | Performed by: EMERGENCY MEDICINE

## 2021-02-12 PROCEDURE — 76801 OB US < 14 WKS SINGLE FETUS: CPT | Performed by: EMERGENCY MEDICINE

## 2021-02-12 PROCEDURE — 80053 COMPREHEN METABOLIC PANEL: CPT | Performed by: EMERGENCY MEDICINE

## 2021-02-12 PROCEDURE — 96360 HYDRATION IV INFUSION INIT: CPT

## 2021-02-12 PROCEDURE — 81001 URINALYSIS AUTO W/SCOPE: CPT | Performed by: EMERGENCY MEDICINE

## 2021-02-12 RX ORDER — ACETAMINOPHEN 325 MG/1
650 TABLET ORAL ONCE
Status: COMPLETED | OUTPATIENT
Start: 2021-02-12 | End: 2021-02-12

## 2021-02-13 VITALS
BODY MASS INDEX: 31.89 KG/M2 | HEART RATE: 72 BPM | WEIGHT: 180 LBS | DIASTOLIC BLOOD PRESSURE: 56 MMHG | SYSTOLIC BLOOD PRESSURE: 101 MMHG | TEMPERATURE: 98 F | HEIGHT: 63 IN | RESPIRATION RATE: 18 BRPM | OXYGEN SATURATION: 98 %

## 2021-02-13 LAB
BILIRUB UR QL STRIP.AUTO: NEGATIVE
COLOR UR AUTO: YELLOW
GLUCOSE UR STRIP.AUTO-MCNC: NEGATIVE MG/DL
KETONES UR STRIP.AUTO-MCNC: NEGATIVE MG/DL
NITRITE UR QL STRIP.AUTO: NEGATIVE
PH UR STRIP.AUTO: 7 [PH] (ref 4.5–8)
PROT UR STRIP.AUTO-MCNC: NEGATIVE MG/DL
RBC #/AREA URNS AUTO: >10 /HPF
SP GR UR STRIP.AUTO: 1.02 (ref 1–1.03)
UROBILINOGEN UR STRIP.AUTO-MCNC: 2 MG/DL

## 2021-02-13 PROCEDURE — 88233 TISSUE CULTURE SKIN/BIOPSY: CPT | Performed by: EMERGENCY MEDICINE

## 2021-02-13 PROCEDURE — 88262 CHROMOSOME ANALYSIS 15-20: CPT | Performed by: EMERGENCY MEDICINE

## 2021-02-13 PROCEDURE — 88291 CYTO/MOLECULAR REPORT: CPT | Performed by: EMERGENCY MEDICINE

## 2021-02-13 PROCEDURE — 88305 TISSUE EXAM BY PATHOLOGIST: CPT | Performed by: EMERGENCY MEDICINE

## 2021-02-13 NOTE — ED INITIAL ASSESSMENT (HPI)
Patient reports having vaginal spotting since Monday, states she started having heavy bleeding today with clots. Pt reports she is 10 weeks pregnant tomorrow. Patient also reports having abdominal cramping starting this evening.

## 2021-02-13 NOTE — PROGRESS NOTES
Returned patient's page. Patient reports she is 10 weeks tomorrow pregnancy. She reports first semester vaginal spotting. She reports bleeding has been present for some time. However, her bleeding has increased tonight.   She reports passage of bright r

## 2021-02-13 NOTE — ED PROVIDER NOTES
Patient Seen in: [facility] Emergency Department      History   Patient presents with:  Eval-G    Stated Complaint: Vaginal Bleeding & cramping. 10 wks pregnant.      HPI/Subjective:   HPI    81-year-old female  1 para 0 at 9 weeks and 6 days and atraumatic. Eyes:      Pupils: Pupils are equal, round, and reactive to light. Neck:      Musculoskeletal: Normal range of motion and neck supple. Cardiovascular:      Rate and Rhythm: Normal rate and regular rhythm.    Pulmonary:      Effort: Pul Finding most consistent with spontaneous AB in progress. Uterus empty. Single nonliving gestational and within endocervical canal estimated stational age 9 weeks 1 day by CRL. No discernible fetal cardiac activity.   Bilateral ovaries appear normal with

## 2021-02-17 ENCOUNTER — OFFICE VISIT (OUTPATIENT)
Dept: OBGYN CLINIC | Facility: CLINIC | Age: 31
End: 2021-02-17
Payer: COMMERCIAL

## 2021-02-17 ENCOUNTER — ULTRASOUND ENCOUNTER (OUTPATIENT)
Dept: OBGYN CLINIC | Facility: CLINIC | Age: 31
End: 2021-02-17
Payer: COMMERCIAL

## 2021-02-17 VITALS
WEIGHT: 178 LBS | DIASTOLIC BLOOD PRESSURE: 74 MMHG | SYSTOLIC BLOOD PRESSURE: 116 MMHG | HEIGHT: 63.5 IN | BODY MASS INDEX: 31.15 KG/M2

## 2021-02-17 DIAGNOSIS — O03.9 COMPLETE MISCARRIAGE: Primary | ICD-10-CM

## 2021-02-17 PROCEDURE — 3074F SYST BP LT 130 MM HG: CPT | Performed by: OBSTETRICS & GYNECOLOGY

## 2021-02-17 PROCEDURE — 99213 OFFICE O/P EST LOW 20 MIN: CPT | Performed by: OBSTETRICS & GYNECOLOGY

## 2021-02-17 PROCEDURE — 76830 TRANSVAGINAL US NON-OB: CPT | Performed by: OBSTETRICS & GYNECOLOGY

## 2021-02-17 PROCEDURE — 3008F BODY MASS INDEX DOCD: CPT | Performed by: OBSTETRICS & GYNECOLOGY

## 2021-02-17 PROCEDURE — 3078F DIAST BP <80 MM HG: CPT | Performed by: OBSTETRICS & GYNECOLOGY

## 2021-02-17 NOTE — PROGRESS NOTES
METOPROLOL ER SUCCINATE 25MG TABS      Last Written Prescription Date:  4/16/2019   Last Fill Quantity: 90,   # refills: 3  Last Office Visit : 5/19/20  Refilled per protocol        Subjective:  Patient presents with: Follow - Up: after US     Patient presents for follow up to miscarriage. Doing well. No pain, fever or bleeding. Passed tissue in ED. Chromosomes pending. Normal pathology. Blood type O pos.     Objective:  Physica

## 2021-07-29 NOTE — PROGRESS NOTES
RN spoke with other nurse regarding this. Bleeding had decreased. Gestational hypertension, third trimester

## 2021-08-09 ENCOUNTER — OFFICE VISIT (OUTPATIENT)
Dept: OBGYN CLINIC | Facility: CLINIC | Age: 31
End: 2021-08-09
Payer: COMMERCIAL

## 2021-08-09 VITALS
RESPIRATION RATE: 18 BRPM | SYSTOLIC BLOOD PRESSURE: 114 MMHG | BODY MASS INDEX: 32 KG/M2 | HEART RATE: 60 BPM | DIASTOLIC BLOOD PRESSURE: 78 MMHG | WEIGHT: 182 LBS

## 2021-08-09 DIAGNOSIS — N97.0 ANOVULATORY CYCLE: Primary | ICD-10-CM

## 2021-08-09 DIAGNOSIS — N92.6 IRREGULAR MENSTRUAL CYCLE: ICD-10-CM

## 2021-08-09 PROCEDURE — 3074F SYST BP LT 130 MM HG: CPT | Performed by: OBSTETRICS & GYNECOLOGY

## 2021-08-09 PROCEDURE — 3078F DIAST BP <80 MM HG: CPT | Performed by: OBSTETRICS & GYNECOLOGY

## 2021-08-09 NOTE — PROGRESS NOTES
OB/GYN H&P       CC: Patient presents with:  Consult: trying to conceive, concerns about regulating menses.  [facility] 2021      HPI: Ceci Plaza is a 28year old  here for *consultation regarding her irregular menstrual cycles and inability to ti Paternal Grandfather         stroke syndrome   • Stroke Father    • Lipids Mother    • Thyroid disease Other         Jhon Henrysmith aunts with thyroid issues.  One had thyroid cancer     Social History    Tobacco Use      Smoking status: Never Smoker      Smokeless to

## 2021-09-13 ENCOUNTER — LAB ENCOUNTER (OUTPATIENT)
Dept: LAB | Age: 31
End: 2021-09-13
Attending: OBSTETRICS & GYNECOLOGY
Payer: COMMERCIAL

## 2021-09-13 DIAGNOSIS — N92.6 IRREGULAR MENSTRUAL CYCLE: ICD-10-CM

## 2021-09-13 DIAGNOSIS — N97.0 ANOVULATORY CYCLE: ICD-10-CM

## 2021-09-13 LAB
FSH SERPL-ACNC: 6.2 MIU/ML
LH SERPL-ACNC: 15 MIU/ML
PROLACTIN SERPL-MCNC: 7.1 NG/ML
TSI SER-ACNC: 0.65 MIU/ML (ref 0.36–3.74)

## 2021-09-13 PROCEDURE — 84443 ASSAY THYROID STIM HORMONE: CPT

## 2021-09-13 PROCEDURE — 83002 ASSAY OF GONADOTROPIN (LH): CPT

## 2021-09-13 PROCEDURE — 84146 ASSAY OF PROLACTIN: CPT

## 2021-09-13 PROCEDURE — 36415 COLL VENOUS BLD VENIPUNCTURE: CPT

## 2021-09-13 PROCEDURE — 83001 ASSAY OF GONADOTROPIN (FSH): CPT

## 2021-11-10 ENCOUNTER — OFFICE VISIT (OUTPATIENT)
Dept: INTERNAL MEDICINE CLINIC | Facility: CLINIC | Age: 31
End: 2021-11-10
Payer: COMMERCIAL

## 2021-11-10 ENCOUNTER — LAB ENCOUNTER (OUTPATIENT)
Dept: LAB | Age: 31
End: 2021-11-10
Attending: INTERNAL MEDICINE
Payer: COMMERCIAL

## 2021-11-10 VITALS
DIASTOLIC BLOOD PRESSURE: 70 MMHG | SYSTOLIC BLOOD PRESSURE: 106 MMHG | HEART RATE: 64 BPM | TEMPERATURE: 98 F | WEIGHT: 180.81 LBS | HEIGHT: 63.75 IN | BODY MASS INDEX: 31.25 KG/M2 | RESPIRATION RATE: 16 BRPM | OXYGEN SATURATION: 99 %

## 2021-11-10 DIAGNOSIS — Z00.00 ENCOUNTER FOR PREVENTATIVE ADULT HEALTH CARE EXAMINATION: Primary | ICD-10-CM

## 2021-11-10 DIAGNOSIS — Z00.00 ENCOUNTER FOR PREVENTATIVE ADULT HEALTH CARE EXAMINATION: ICD-10-CM

## 2021-11-10 DIAGNOSIS — E06.3 HYPOTHYROIDISM, ACQUIRED, AUTOIMMUNE: ICD-10-CM

## 2021-11-10 DIAGNOSIS — R10.11 RIGHT UPPER QUADRANT PAIN: ICD-10-CM

## 2021-11-10 PROCEDURE — 3074F SYST BP LT 130 MM HG: CPT | Performed by: INTERNAL MEDICINE

## 2021-11-10 PROCEDURE — 3078F DIAST BP <80 MM HG: CPT | Performed by: INTERNAL MEDICINE

## 2021-11-10 PROCEDURE — 83036 HEMOGLOBIN GLYCOSYLATED A1C: CPT | Performed by: INTERNAL MEDICINE

## 2021-11-10 PROCEDURE — 3008F BODY MASS INDEX DOCD: CPT | Performed by: INTERNAL MEDICINE

## 2021-11-10 PROCEDURE — 80061 LIPID PANEL: CPT | Performed by: INTERNAL MEDICINE

## 2021-11-10 PROCEDURE — 99395 PREV VISIT EST AGE 18-39: CPT | Performed by: INTERNAL MEDICINE

## 2021-11-10 PROCEDURE — 85025 COMPLETE CBC W/AUTO DIFF WBC: CPT | Performed by: INTERNAL MEDICINE

## 2021-11-10 PROCEDURE — 80053 COMPREHEN METABOLIC PANEL: CPT | Performed by: INTERNAL MEDICINE

## 2021-11-10 NOTE — PROGRESS NOTES
Dolly Fabry is a 28year old female. HPI:   Patient presents with:  Physical: Fasting; Patient presents for CPX/wellness examination.   Diet: Terrible per patient   Exercise: Not really any regular exercise  Vision: Wears glasses - last eye exam wa reports that she does not use drugs.   Wt Readings from Last 6 Encounters:  11/10/21 : 180 lb 12.8 oz (82 kg)  08/09/21 : 182 lb (82.6 kg)  02/17/21 : 178 lb (80.7 kg)  02/12/21 : 180 lb (81.6 kg)  01/20/21 : 182 lb (82.6 kg)  11/05/20 : 172 lb 6.4 oz (78.2 pain  No tenderness today, but she has intermittent RUQ tenderness, post-prandially, sounds like possible symptomatic cholelithiasis. Will check US abdomen. She will try and schedule it at work (she works at [employer]).   - US ABDOMEN C

## 2021-11-10 NOTE — PATIENT INSTRUCTIONS
- Get blood work done today  - Recommend you get third COVID booster shot  - Follow up with Dr. Patricia Guillen for thyroid  - Schedule abdominal ultrasound to look at gallbladder. You can get it done at [facility] (use paper order).   - Follow up in 1

## 2022-01-19 ENCOUNTER — HOSPITAL ENCOUNTER (OUTPATIENT)
Dept: ULTRASOUND IMAGING | Age: 32
Discharge: HOME | End: 2022-01-19
Attending: INTERNAL MEDICINE
Payer: COMMERCIAL

## 2022-01-19 DIAGNOSIS — R10.11 RIGHT UPPER QUADRANT PAIN: ICD-10-CM

## 2022-01-19 PROCEDURE — 76700 US EXAM ABDOM COMPLETE: CPT | Performed by: INTERNAL MEDICINE

## 2022-03-15 ENCOUNTER — TELEPHONE (OUTPATIENT)
Dept: OBGYN CLINIC | Facility: CLINIC | Age: 32
End: 2022-03-15

## 2022-03-15 NOTE — TELEPHONE ENCOUNTER
Patient calling to schedule  appt. She states her last cycle was from 1/14- 2/12.  Please call to advise

## 2022-03-15 NOTE — TELEPHONE ENCOUNTER
Established patient. Irregular menses and hx of SAB. Labs ordered. O pos blood type noted on file    Contacted patient. Instructions given for labs and bleeding/ER precautions. Patient states understanding and agrees with plan. Will await labs to determine appt timing.

## 2022-03-16 ENCOUNTER — LAB ENCOUNTER (OUTPATIENT)
Dept: LAB | Age: 32
End: 2022-03-16
Attending: OBSTETRICS & GYNECOLOGY
Payer: COMMERCIAL

## 2022-03-16 DIAGNOSIS — N91.2 AMENORRHEA: ICD-10-CM

## 2022-03-16 DIAGNOSIS — O09.299 HISTORY OF MISCARRIAGE, CURRENTLY PREGNANT: ICD-10-CM

## 2022-03-16 LAB
B-HCG SERPL-ACNC: 113 MIU/ML
PROGEST SERPL-MCNC: 12 NG/ML

## 2022-03-16 PROCEDURE — 84702 CHORIONIC GONADOTROPIN TEST: CPT

## 2022-03-16 PROCEDURE — 84144 ASSAY OF PROGESTERONE: CPT

## 2022-03-16 PROCEDURE — 36415 COLL VENOUS BLD VENIPUNCTURE: CPT

## 2022-03-18 ENCOUNTER — TELEPHONE (OUTPATIENT)
Dept: OBGYN CLINIC | Facility: CLINIC | Age: 32
End: 2022-03-18

## 2022-03-18 NOTE — TELEPHONE ENCOUNTER
Michelle Pemberton, can you review patient's labs. She wasn't sure of her LMP with a history of miscarriage  Should she repeat HCG today or wait until next week?

## 2022-03-19 ENCOUNTER — LAB ENCOUNTER (OUTPATIENT)
Dept: LAB | Facility: HOSPITAL | Age: 32
End: 2022-03-19
Attending: NURSE PRACTITIONER
Payer: COMMERCIAL

## 2022-03-19 DIAGNOSIS — O09.299 HISTORY OF MISCARRIAGE, CURRENTLY PREGNANT: ICD-10-CM

## 2022-03-19 LAB — B-HCG SERPL-ACNC: 408 MIU/ML

## 2022-03-19 PROCEDURE — 36415 COLL VENOUS BLD VENIPUNCTURE: CPT

## 2022-03-19 PROCEDURE — 84702 CHORIONIC GONADOTROPIN TEST: CPT

## 2022-03-21 ENCOUNTER — PATIENT MESSAGE (OUTPATIENT)
Dept: OBGYN CLINIC | Facility: CLINIC | Age: 32
End: 2022-03-21

## 2022-03-21 NOTE — TELEPHONE ENCOUNTER
From: Andrews Berumenocent  To: ROSSY Viveros  Sent: 3/21/2022 1:09 PM CDT  Subject: 1st Prenatal Appointment     Hello! Based on my lab results, when should I schedule my first appointment?

## 2022-04-13 ENCOUNTER — OFFICE VISIT (OUTPATIENT)
Dept: OBGYN CLINIC | Facility: CLINIC | Age: 32
End: 2022-04-13
Payer: COMMERCIAL

## 2022-04-13 VITALS
SYSTOLIC BLOOD PRESSURE: 108 MMHG | WEIGHT: 186 LBS | HEIGHT: 64 IN | BODY MASS INDEX: 31.76 KG/M2 | DIASTOLIC BLOOD PRESSURE: 72 MMHG

## 2022-04-13 DIAGNOSIS — N91.1 SECONDARY AMENORRHEA: Primary | ICD-10-CM

## 2022-04-13 DIAGNOSIS — Z32.01 PREGNANCY EXAMINATION OR TEST, POSITIVE RESULT: ICD-10-CM

## 2022-04-13 PROCEDURE — 3074F SYST BP LT 130 MM HG: CPT | Performed by: OBSTETRICS & GYNECOLOGY

## 2022-04-13 PROCEDURE — 76856 US EXAM PELVIC COMPLETE: CPT | Performed by: OBSTETRICS & GYNECOLOGY

## 2022-04-13 PROCEDURE — 99213 OFFICE O/P EST LOW 20 MIN: CPT | Performed by: OBSTETRICS & GYNECOLOGY

## 2022-04-13 PROCEDURE — 3008F BODY MASS INDEX DOCD: CPT | Performed by: OBSTETRICS & GYNECOLOGY

## 2022-04-13 PROCEDURE — 3078F DIAST BP <80 MM HG: CPT | Performed by: OBSTETRICS & GYNECOLOGY

## 2022-04-19 ENCOUNTER — TELEPHONE (OUTPATIENT)
Dept: OBGYN CLINIC | Facility: CLINIC | Age: 32
End: 2022-04-19

## 2022-04-19 NOTE — TELEPHONE ENCOUNTER
Patient states that Dr Stewart Lucero called her yesterday to discuss US results.  He left a message for her to call the office and speak to a nurse

## 2022-04-19 NOTE — TELEPHONE ENCOUNTER
Contacted patient. She states that Dr. Gordon Leon left a message yesterday regarding her ultrasound, but she did see him in office last week and reviewed. So she isn't sure what additional information would be needed to be communicated to her. Will route to him for clarification. She is aware that he is back in office tomorrow.

## 2022-04-22 NOTE — TELEPHONE ENCOUNTER
Patient is waiting for call and getting upset, Dr William Jazmine is not in today please call     Thank you

## 2022-05-12 ENCOUNTER — INITIAL PRENATAL (OUTPATIENT)
Dept: OBGYN CLINIC | Facility: CLINIC | Age: 32
End: 2022-05-12
Payer: COMMERCIAL

## 2022-05-12 VITALS
WEIGHT: 184.63 LBS | DIASTOLIC BLOOD PRESSURE: 72 MMHG | HEIGHT: 63.75 IN | HEART RATE: 78 BPM | SYSTOLIC BLOOD PRESSURE: 112 MMHG | BODY MASS INDEX: 31.91 KG/M2

## 2022-05-12 DIAGNOSIS — E03.9 HYPOTHYROIDISM, UNSPECIFIED TYPE: ICD-10-CM

## 2022-05-12 DIAGNOSIS — Z34.90 PRENATAL CARE, ANTEPARTUM: Primary | ICD-10-CM

## 2022-05-12 LAB
GLUCOSE (URINE DIPSTICK): NEGATIVE MG/DL
MULTISTIX LOT#: NORMAL NUMERIC
PROTEIN (URINE DIPSTICK): NEGATIVE MG/DL

## 2022-05-12 PROCEDURE — 3074F SYST BP LT 130 MM HG: CPT | Performed by: OBSTETRICS & GYNECOLOGY

## 2022-05-12 PROCEDURE — 88175 CYTOPATH C/V AUTO FLUID REDO: CPT | Performed by: OBSTETRICS & GYNECOLOGY

## 2022-05-12 PROCEDURE — 81002 URINALYSIS NONAUTO W/O SCOPE: CPT | Performed by: OBSTETRICS & GYNECOLOGY

## 2022-05-12 PROCEDURE — 87086 URINE CULTURE/COLONY COUNT: CPT | Performed by: OBSTETRICS & GYNECOLOGY

## 2022-05-12 PROCEDURE — 87591 N.GONORRHOEAE DNA AMP PROB: CPT | Performed by: OBSTETRICS & GYNECOLOGY

## 2022-05-12 PROCEDURE — 87491 CHLMYD TRACH DNA AMP PROBE: CPT | Performed by: OBSTETRICS & GYNECOLOGY

## 2022-05-12 PROCEDURE — 87624 HPV HI-RISK TYP POOLED RSLT: CPT | Performed by: OBSTETRICS & GYNECOLOGY

## 2022-05-12 PROCEDURE — 3078F DIAST BP <80 MM HG: CPT | Performed by: OBSTETRICS & GYNECOLOGY

## 2022-05-12 PROCEDURE — 3008F BODY MASS INDEX DOCD: CPT | Performed by: OBSTETRICS & GYNECOLOGY

## 2022-05-12 RX ORDER — PRENATAL VIT,CAL 76/IRON/FOLIC 29 MG-1 MG
1 TABLET ORAL DAILY
COMMUNITY
Start: 2022-04-27

## 2022-05-13 ENCOUNTER — TELEPHONE (OUTPATIENT)
Dept: OBGYN CLINIC | Facility: CLINIC | Age: 32
End: 2022-05-13

## 2022-05-13 LAB
C TRACH DNA SPEC QL NAA+PROBE: NEGATIVE
N GONORRHOEA DNA SPEC QL NAA+PROBE: NEGATIVE

## 2022-05-13 NOTE — TELEPHONE ENCOUNTER
G2/P 0010 GA 12 4/7 wks patient complaining of spotting that just started. She was seen in office yesterday and she had a pap smear. Denies any cramping but does have some low back pain. Has had about 20 oz of water so far today. Last OV: 5/12/22 NOB with Dr. Alas Mom  Pregnancy Complications: hypothyroid  Abdominal pain: denies  Leaking of fluid: denies  Vaginal Bleeding: see above  Fetal Movement: n/a, 12 wks  Recommendations: advised to continue to monitor; likely related to pap smear. Pelvic rest instructions given, to push fluids today, and avoid strenuous activity. Bleeding/ER precautions also given. Patient states understanding and agrees with plan.

## 2022-05-13 NOTE — TELEPHONE ENCOUNTER
Patient called stating she had appointment yesterday and this morning she just started spotting     Thank you

## 2022-05-14 ENCOUNTER — LAB ENCOUNTER (OUTPATIENT)
Dept: LAB | Age: 32
End: 2022-05-14
Attending: INTERNAL MEDICINE
Payer: COMMERCIAL

## 2022-05-14 DIAGNOSIS — E03.9 HYPOTHYROIDISM, UNSPECIFIED TYPE: ICD-10-CM

## 2022-05-14 DIAGNOSIS — Z34.90 PRENATAL CARE, ANTEPARTUM: ICD-10-CM

## 2022-05-14 LAB
ANTIBODY SCREEN: NEGATIVE
BASOPHILS # BLD AUTO: 0.02 X10(3) UL (ref 0–0.2)
BASOPHILS NFR BLD AUTO: 0.2 %
EOSINOPHIL # BLD AUTO: 0.03 X10(3) UL (ref 0–0.7)
EOSINOPHIL NFR BLD AUTO: 0.4 %
ERYTHROCYTE [DISTWIDTH] IN BLOOD BY AUTOMATED COUNT: 13.3 %
HBV SURFACE AG SER-ACNC: <0.1 [IU]/L
HBV SURFACE AG SERPL QL IA: NONREACTIVE
HCT VFR BLD AUTO: 41.4 %
HGB BLD-MCNC: 13.8 G/DL
IMM GRANULOCYTES # BLD AUTO: 0.02 X10(3) UL (ref 0–1)
IMM GRANULOCYTES NFR BLD: 0.2 %
LYMPHOCYTES # BLD AUTO: 2.31 X10(3) UL (ref 1–4)
LYMPHOCYTES NFR BLD AUTO: 27.5 %
MCH RBC QN AUTO: 30.6 PG (ref 26–34)
MCHC RBC AUTO-ENTMCNC: 33.3 G/DL (ref 31–37)
MCV RBC AUTO: 91.8 FL
MONOCYTES # BLD AUTO: 0.5 X10(3) UL (ref 0.1–1)
MONOCYTES NFR BLD AUTO: 5.9 %
NEUTROPHILS # BLD AUTO: 5.53 X10 (3) UL (ref 1.5–7.7)
NEUTROPHILS # BLD AUTO: 5.53 X10(3) UL (ref 1.5–7.7)
NEUTROPHILS NFR BLD AUTO: 65.8 %
PLATELET # BLD AUTO: 308 10(3)UL (ref 150–450)
RBC # BLD AUTO: 4.51 X10(6)UL
RH BLOOD TYPE: POSITIVE
RUBV IGG SER QL: POSITIVE
RUBV IGG SER-ACNC: 190.3 IU/ML (ref 10–?)
T PALLIDUM AB SER QL IA: NONREACTIVE
TSI SER-ACNC: 1.8 MIU/ML (ref 0.36–3.74)
WBC # BLD AUTO: 8.4 X10(3) UL (ref 4–11)

## 2022-05-14 PROCEDURE — 36415 COLL VENOUS BLD VENIPUNCTURE: CPT

## 2022-05-14 PROCEDURE — 86762 RUBELLA ANTIBODY: CPT

## 2022-05-14 PROCEDURE — 86850 RBC ANTIBODY SCREEN: CPT

## 2022-05-14 PROCEDURE — 86780 TREPONEMA PALLIDUM: CPT

## 2022-05-14 PROCEDURE — 86901 BLOOD TYPING SEROLOGIC RH(D): CPT

## 2022-05-14 PROCEDURE — 87340 HEPATITIS B SURFACE AG IA: CPT

## 2022-05-14 PROCEDURE — 84443 ASSAY THYROID STIM HORMONE: CPT

## 2022-05-14 PROCEDURE — 85025 COMPLETE CBC W/AUTO DIFF WBC: CPT

## 2022-05-14 PROCEDURE — 86900 BLOOD TYPING SEROLOGIC ABO: CPT

## 2022-05-14 PROCEDURE — 87389 HIV-1 AG W/HIV-1&-2 AB AG IA: CPT

## 2022-05-18 LAB — HPV I/H RISK 1 DNA SPEC QL NAA+PROBE: NEGATIVE

## 2022-05-25 ENCOUNTER — HOSPITAL ENCOUNTER (EMERGENCY)
Age: 32
Discharge: HOME | End: 2022-05-25
Attending: EMERGENCY MEDICINE
Payer: COMMERCIAL

## 2022-05-25 VITALS
HEIGHT: 64 IN | OXYGEN SATURATION: 98 % | HEART RATE: 97 BPM | BODY MASS INDEX: 31.76 KG/M2 | RESPIRATION RATE: 16 BRPM | SYSTOLIC BLOOD PRESSURE: 121 MMHG | WEIGHT: 186 LBS | DIASTOLIC BLOOD PRESSURE: 70 MMHG | TEMPERATURE: 100 F

## 2022-05-25 DIAGNOSIS — Z3A.14 14 WEEKS GESTATION OF PREGNANCY: ICD-10-CM

## 2022-05-25 DIAGNOSIS — U07.1 COVID-19: Primary | ICD-10-CM

## 2022-05-25 LAB
ALBUMIN SERPL-MCNC: 3.4 G/DL (ref 3.4–5)
ALBUMIN/GLOB SERPL: 0.9 {RATIO} (ref 1–2)
ALP LIVER SERPL-CCNC: 50 U/L
ALT SERPL-CCNC: 75 U/L
ANION GAP SERPL CALC-SCNC: 8 MMOL/L (ref 0–18)
AST SERPL-CCNC: 38 U/L (ref 15–37)
BASOPHILS # BLD AUTO: 0.01 X10(3) UL (ref 0–0.2)
BASOPHILS NFR BLD AUTO: 0.1 %
BILIRUB SERPL-MCNC: 0.4 MG/DL (ref 0.1–2)
BILIRUB UR QL STRIP.AUTO: NEGATIVE
BUN BLD-MCNC: 5 MG/DL (ref 7–18)
CALCIUM BLD-MCNC: 9.2 MG/DL (ref 8.5–10.1)
CHLORIDE SERPL-SCNC: 103 MMOL/L (ref 98–112)
CLARITY UR REFRACT.AUTO: CLEAR
CO2 SERPL-SCNC: 25 MMOL/L (ref 21–32)
COLOR UR AUTO: YELLOW
CREAT BLD-MCNC: 0.55 MG/DL
EOSINOPHIL # BLD AUTO: 0.01 X10(3) UL (ref 0–0.7)
EOSINOPHIL NFR BLD AUTO: 0.1 %
ERYTHROCYTE [DISTWIDTH] IN BLOOD BY AUTOMATED COUNT: 13.1 %
GLOBULIN PLAS-MCNC: 4 G/DL (ref 2.8–4.4)
GLUCOSE BLD-MCNC: 98 MG/DL (ref 70–99)
GLUCOSE UR STRIP.AUTO-MCNC: NEGATIVE MG/DL
HCT VFR BLD AUTO: 37.2 %
HGB BLD-MCNC: 13 G/DL
IMM GRANULOCYTES # BLD AUTO: 0.03 X10(3) UL (ref 0–1)
IMM GRANULOCYTES NFR BLD: 0.4 %
KETONES UR STRIP.AUTO-MCNC: 40 MG/DL
LEUKOCYTE ESTERASE UR QL STRIP.AUTO: NEGATIVE
LYMPHOCYTES # BLD AUTO: 0.78 X10(3) UL (ref 1–4)
LYMPHOCYTES NFR BLD AUTO: 10.4 %
MCH RBC QN AUTO: 30.8 PG (ref 26–34)
MCHC RBC AUTO-ENTMCNC: 34.9 G/DL (ref 31–37)
MCV RBC AUTO: 88.2 FL
MONOCYTES # BLD AUTO: 0.68 X10(3) UL (ref 0.1–1)
MONOCYTES NFR BLD AUTO: 9.1 %
NEUTROPHILS # BLD AUTO: 6 X10 (3) UL (ref 1.5–7.7)
NEUTROPHILS # BLD AUTO: 6 X10(3) UL (ref 1.5–7.7)
NEUTROPHILS NFR BLD AUTO: 79.9 %
NITRITE UR QL STRIP.AUTO: NEGATIVE
OSMOLALITY SERPL CALC.SUM OF ELEC: 279 MOSM/KG (ref 275–295)
PH UR STRIP.AUTO: 6.5 [PH] (ref 5–8)
PLATELET # BLD AUTO: 237 10(3)UL (ref 150–450)
POTASSIUM SERPL-SCNC: 3.6 MMOL/L (ref 3.5–5.1)
PROT SERPL-MCNC: 7.4 G/DL (ref 6.4–8.2)
PROT UR STRIP.AUTO-MCNC: NEGATIVE MG/DL
RBC # BLD AUTO: 4.22 X10(6)UL
SARS-COV-2 RNA RESP QL NAA+PROBE: DETECTED
SODIUM SERPL-SCNC: 136 MMOL/L (ref 136–145)
SP GR UR STRIP.AUTO: 1.01 (ref 1–1.03)
UROBILINOGEN UR STRIP.AUTO-MCNC: 0.2 MG/DL
WBC # BLD AUTO: 7.5 X10(3) UL (ref 4–11)

## 2022-05-25 PROCEDURE — 99283 EMERGENCY DEPT VISIT LOW MDM: CPT

## 2022-05-25 PROCEDURE — 36415 COLL VENOUS BLD VENIPUNCTURE: CPT

## 2022-05-25 PROCEDURE — 85025 COMPLETE CBC W/AUTO DIFF WBC: CPT | Performed by: PHYSICIAN ASSISTANT

## 2022-05-25 PROCEDURE — 81001 URINALYSIS AUTO W/SCOPE: CPT | Performed by: PHYSICIAN ASSISTANT

## 2022-05-25 PROCEDURE — 80053 COMPREHEN METABOLIC PANEL: CPT | Performed by: PHYSICIAN ASSISTANT

## 2022-05-25 PROCEDURE — 99284 EMERGENCY DEPT VISIT MOD MDM: CPT

## 2022-05-25 NOTE — ED INITIAL ASSESSMENT (HPI)
Pt presents with lower back pain, hip pain, and bilateral knee pain starting today at 1200. Pt states she took her temp while at home and it was 101.0, no tylenol taken. Pt denies n/v/d and urinary complaints. Only other symptoms is sinus congestion. Pt is 14 weeks pregnant, no complications.

## 2022-05-25 NOTE — ED NOTES
I reviewed that chart and discussed the case. I have examined the patient and noted    Patient with complaints of symptoms that started today about 12:00 diffuse body ache took her temperature was noted to be 101 she denies any dysuria cough trouble breathing she does have a little bit of runny nose and nasal congestion. She is 14 weeks pregnant  2 para 0. Denies any abdominal pain or vaginal bleeding       general: Female no respiratory distress neck is supple she does have findings of a cold, runny nose. The patient is in no respiratory distress    HEENT: There is no signs of trauma. Oral mucosa is wet. Lungs: Clear to auscultation without wheezing or retractions    Cardiovascular: Regular without murmurs  Abdomen soft nontender is no masses no rebound no guarding  Extremities: Good pulses bilaterally. Neuro: Alert and oriented. The patient is moving all extremities there is no focal findings. Patient's urinalysis shows no findings of urinary tract infection. Small amount of ketones but no findings of urine tract infection CBC was normal COVID was detected. I discussed with her about using the monoclonal she is fully boosted, immunized and she feels comfortable without doing it. Will discuss this case with OB. She does not look ill or septic. Fetal heart tones done by me using ultrasound shows a fetal heart tones of 150s  No obvious findings of urinary tract infection. Most likely symptoms are positive COVID. We did talk to OB and in light the fact he is fully immunized, boosted felt that it with that it would be over to do monoclonal's and in light of the fact that she is already boosted and immunized with very little symptoms. She is not short of breath.   She verbalized understanding she states that she does not want a monoclonal's at this present time I discussed she has any trouble breathing or shortness of breath return here she verbalized understanding agreed treatment plan      Assessment  COVID  Viral syndrome      I provided a substantive portion of care for this patient. I personally performed the medical decision making for this encounter.

## 2022-06-09 ENCOUNTER — ROUTINE PRENATAL (OUTPATIENT)
Dept: OBGYN CLINIC | Facility: CLINIC | Age: 32
End: 2022-06-09
Payer: COMMERCIAL

## 2022-06-09 VITALS
HEART RATE: 86 BPM | BODY MASS INDEX: 33.02 KG/M2 | DIASTOLIC BLOOD PRESSURE: 66 MMHG | HEIGHT: 63 IN | SYSTOLIC BLOOD PRESSURE: 106 MMHG | WEIGHT: 186.38 LBS

## 2022-06-09 DIAGNOSIS — O98.512 COVID-19 AFFECTING PREGNANCY IN SECOND TRIMESTER: ICD-10-CM

## 2022-06-09 DIAGNOSIS — U07.1 COVID-19 AFFECTING PREGNANCY IN SECOND TRIMESTER: ICD-10-CM

## 2022-06-09 DIAGNOSIS — Z34.90 PRENATAL CARE, ANTEPARTUM: Primary | ICD-10-CM

## 2022-06-09 LAB
GLUCOSE (URINE DIPSTICK): NEGATIVE MG/DL
MULTISTIX EXPIRATION DATE: NORMAL DATE
MULTISTIX LOT#: NORMAL NUMERIC
PROTEIN (URINE DIPSTICK): NEGATIVE MG/DL

## 2022-06-09 PROCEDURE — 3008F BODY MASS INDEX DOCD: CPT | Performed by: OBSTETRICS & GYNECOLOGY

## 2022-06-09 PROCEDURE — 81002 URINALYSIS NONAUTO W/O SCOPE: CPT | Performed by: OBSTETRICS & GYNECOLOGY

## 2022-06-09 PROCEDURE — 3078F DIAST BP <80 MM HG: CPT | Performed by: OBSTETRICS & GYNECOLOGY

## 2022-06-09 PROCEDURE — 3074F SYST BP LT 130 MM HG: CPT | Performed by: OBSTETRICS & GYNECOLOGY

## 2022-06-09 NOTE — PROGRESS NOTES
NGUYỄN. 16w3d    Doing better. Had COVID-19 end of May, symptoms: congestion and fever - improved. Denies abdominal/pelvic pain or vaginal bleeding. Rh pos   Genetic screening - decliend   Recommend level II US and MFM consult.    Prenatal labs reviewed     RTC in 4 weeks

## 2022-07-07 ENCOUNTER — ROUTINE PRENATAL (OUTPATIENT)
Dept: OBGYN CLINIC | Facility: CLINIC | Age: 32
End: 2022-07-07
Payer: COMMERCIAL

## 2022-07-07 VITALS
HEIGHT: 63 IN | HEART RATE: 87 BPM | DIASTOLIC BLOOD PRESSURE: 78 MMHG | BODY MASS INDEX: 34.27 KG/M2 | SYSTOLIC BLOOD PRESSURE: 126 MMHG | WEIGHT: 193.38 LBS

## 2022-07-07 DIAGNOSIS — Z34.80 PRENATAL CARE OF MULTIGRAVIDA, ANTEPARTUM: Primary | ICD-10-CM

## 2022-07-07 DIAGNOSIS — Z36.9 ANTENATAL SCREENING ENCOUNTER: ICD-10-CM

## 2022-07-07 DIAGNOSIS — E06.3 HYPOTHYROIDISM, ACQUIRED, AUTOIMMUNE: ICD-10-CM

## 2022-07-07 PROCEDURE — 3008F BODY MASS INDEX DOCD: CPT | Performed by: OBSTETRICS & GYNECOLOGY

## 2022-07-07 PROCEDURE — 81002 URINALYSIS NONAUTO W/O SCOPE: CPT | Performed by: OBSTETRICS & GYNECOLOGY

## 2022-07-07 PROCEDURE — 3074F SYST BP LT 130 MM HG: CPT | Performed by: OBSTETRICS & GYNECOLOGY

## 2022-07-07 PROCEDURE — 3078F DIAST BP <80 MM HG: CPT | Performed by: OBSTETRICS & GYNECOLOGY

## 2022-07-07 NOTE — PROGRESS NOTES
Patient presents with:  Prenatal Care: Kevin    Routine prenatal visit. Patient without complaints. Good fetal movement. Patient denies any bleeding, leaking fluid, cramping, or contractions. Assessment/Plan:  20w3d doing well  TSH, 1 hr GTT and CBC next. Blood type O pos. Hx of Covid- Screening for fetal malformations- Level 2 tomorrow. Declines all other optional prenatal screening tests including cfdna, NT, genetic carrier screening, AFP/Quad, amnio/CVS.    Diagnoses and all orders for this visit:    Prenatal care of multigravida, antepartum  -     URINALYSIS NONAUTO W/O SCOPE (OB URISTIX)     screening encounter  -     CBC (WITH DIFF, PLATELET) REFLEX TO FERRITIN; Future  -     GLUCOSE 1HR OB; Future      Return in about 4 weeks (around 2022) for Routine Prenatal Visit, Glucola and labs.

## 2022-07-08 ENCOUNTER — OFFICE VISIT (OUTPATIENT)
Dept: PERINATAL CARE | Facility: HOSPITAL | Age: 32
End: 2022-07-08
Attending: OBSTETRICS & GYNECOLOGY
Payer: COMMERCIAL

## 2022-07-08 VITALS
SYSTOLIC BLOOD PRESSURE: 131 MMHG | BODY MASS INDEX: 34 KG/M2 | HEART RATE: 89 BPM | WEIGHT: 194 LBS | DIASTOLIC BLOOD PRESSURE: 81 MMHG

## 2022-07-08 DIAGNOSIS — O98.512 COVID-19 AFFECTING PREGNANCY IN SECOND TRIMESTER: Primary | ICD-10-CM

## 2022-07-08 DIAGNOSIS — U07.1 COVID-19 AFFECTING PREGNANCY IN SECOND TRIMESTER: ICD-10-CM

## 2022-07-08 DIAGNOSIS — O98.512 COVID-19 AFFECTING PREGNANCY IN SECOND TRIMESTER: ICD-10-CM

## 2022-07-08 DIAGNOSIS — O99.212 OBESITY AFFECTING PREGNANCY IN SECOND TRIMESTER: ICD-10-CM

## 2022-07-08 DIAGNOSIS — E06.3 HYPOTHYROIDISM, ACQUIRED, AUTOIMMUNE: ICD-10-CM

## 2022-07-08 DIAGNOSIS — E06.3 HASHIMOTO'S THYROIDITIS: ICD-10-CM

## 2022-07-08 DIAGNOSIS — U07.1 COVID-19 AFFECTING PREGNANCY IN SECOND TRIMESTER: Primary | ICD-10-CM

## 2022-07-08 PROCEDURE — 76811 OB US DETAILED SNGL FETUS: CPT | Performed by: OBSTETRICS & GYNECOLOGY

## 2022-07-13 ENCOUNTER — TELEPHONE (OUTPATIENT)
Dept: OBGYN CLINIC | Facility: CLINIC | Age: 32
End: 2022-07-13

## 2022-07-13 RX ORDER — CEPHALEXIN 500 MG/1
500 CAPSULE ORAL 2 TIMES DAILY
Qty: 14 CAPSULE | Refills: 0 | Status: SHIPPED | OUTPATIENT
Start: 2022-07-13

## 2022-07-13 NOTE — TELEPHONE ENCOUNTER
Pt called in regards to feeling urinary frequency and urgency. No fevers, hematuria. Unable to completely empty her bladder. No vaginal bleeding.     Given precautions, Rx for keflex sent BID x 7 days  Increase PO Hydration  If not feeling improved by Friday 7/15 pt to call and needs UA/urine culture    Rakan Guadarrama MD, 07/13/22, 3:50 PM

## 2022-08-04 ENCOUNTER — LAB ENCOUNTER (OUTPATIENT)
Dept: LAB | Age: 32
End: 2022-08-04
Attending: OBSTETRICS & GYNECOLOGY
Payer: COMMERCIAL

## 2022-08-04 ENCOUNTER — ROUTINE PRENATAL (OUTPATIENT)
Dept: OBGYN CLINIC | Facility: CLINIC | Age: 32
End: 2022-08-04
Payer: COMMERCIAL

## 2022-08-04 VITALS — SYSTOLIC BLOOD PRESSURE: 128 MMHG | BODY MASS INDEX: 34 KG/M2 | DIASTOLIC BLOOD PRESSURE: 76 MMHG | WEIGHT: 194.38 LBS

## 2022-08-04 DIAGNOSIS — Z36.9 ANTENATAL SCREENING ENCOUNTER: ICD-10-CM

## 2022-08-04 DIAGNOSIS — Z34.80 PRENATAL CARE OF MULTIGRAVIDA, ANTEPARTUM: Primary | ICD-10-CM

## 2022-08-04 DIAGNOSIS — O99.810 ABNORMAL GLUCOSE TOLERANCE TEST IN PREGNANCY: ICD-10-CM

## 2022-08-04 DIAGNOSIS — E06.3 HYPOTHYROIDISM, ACQUIRED, AUTOIMMUNE: ICD-10-CM

## 2022-08-04 LAB
BASOPHILS # BLD AUTO: 0.02 X10(3) UL (ref 0–0.2)
BASOPHILS NFR BLD AUTO: 0.2 %
EOSINOPHIL # BLD AUTO: 0.03 X10(3) UL (ref 0–0.7)
EOSINOPHIL NFR BLD AUTO: 0.3 %
ERYTHROCYTE [DISTWIDTH] IN BLOOD BY AUTOMATED COUNT: 13.2 %
GLUCOSE (URINE DIPSTICK): NEGATIVE MG/DL
GLUCOSE 1H P GLC SERPL-MCNC: 174 MG/DL
HCT VFR BLD AUTO: 37.5 %
HGB BLD-MCNC: 12.5 G/DL
IMM GRANULOCYTES # BLD AUTO: 0.04 X10(3) UL (ref 0–1)
IMM GRANULOCYTES NFR BLD: 0.4 %
LYMPHOCYTES # BLD AUTO: 1.86 X10(3) UL (ref 1–4)
LYMPHOCYTES NFR BLD AUTO: 17.9 %
MCH RBC QN AUTO: 31.2 PG (ref 26–34)
MCHC RBC AUTO-ENTMCNC: 33.3 G/DL (ref 31–37)
MCV RBC AUTO: 93.5 FL
MONOCYTES # BLD AUTO: 0.43 X10(3) UL (ref 0.1–1)
MONOCYTES NFR BLD AUTO: 4.1 %
MULTISTIX LOT#: NORMAL NUMERIC
NEUTROPHILS # BLD AUTO: 8 X10 (3) UL (ref 1.5–7.7)
NEUTROPHILS # BLD AUTO: 8 X10(3) UL (ref 1.5–7.7)
NEUTROPHILS NFR BLD AUTO: 77.1 %
PLATELET # BLD AUTO: 292 10(3)UL (ref 150–450)
RBC # BLD AUTO: 4.01 X10(6)UL
TSI SER-ACNC: 0.84 MIU/ML (ref 0.36–3.74)
WBC # BLD AUTO: 10.4 X10(3) UL (ref 4–11)

## 2022-08-04 PROCEDURE — 3078F DIAST BP <80 MM HG: CPT | Performed by: OBSTETRICS & GYNECOLOGY

## 2022-08-04 PROCEDURE — 85025 COMPLETE CBC W/AUTO DIFF WBC: CPT

## 2022-08-04 PROCEDURE — 3074F SYST BP LT 130 MM HG: CPT | Performed by: OBSTETRICS & GYNECOLOGY

## 2022-08-04 PROCEDURE — 82950 GLUCOSE TEST: CPT

## 2022-08-04 PROCEDURE — 81002 URINALYSIS NONAUTO W/O SCOPE: CPT | Performed by: OBSTETRICS & GYNECOLOGY

## 2022-08-04 PROCEDURE — 36415 COLL VENOUS BLD VENIPUNCTURE: CPT

## 2022-08-04 PROCEDURE — 84443 ASSAY THYROID STIM HORMONE: CPT

## 2022-08-04 NOTE — PROGRESS NOTES
Patient presents with:  Prenatal Care: jessica    Routine prenatal visit. Patient without complaints. Good fetal movement  Patient denies any bleeding, leaking fluid, cramping, or contractions. Assessment/Plan:  24w3d doing well  HIV next  1 hour GTT, CBC done today. 1 hr GTT elevated. Check 3 hr GTT. Depression screen reviewed. Blood type O pos  Reviewed vaccine recommendations: Tdap next. Kick count information given. Reviewed  labor signs and symptoms. Diagnoses and all orders for this visit:    Prenatal care of multigravida, antepartum  -     URINALYSIS NONAUTO W/O SCOPE (OB URISTIX)     screening encounter  -     HIV AG AB COMBO; Future    Abnormal glucose tolerance test in pregnancy  -     GLUCOSE MATHIEU 3 HR GESTATIONAL 100 GM (ADA); Future      Return in about 4 weeks (around 2022) for Routine Prenatal Visit, Glucola and labs.

## 2022-08-08 ENCOUNTER — LABORATORY ENCOUNTER (OUTPATIENT)
Dept: LAB | Age: 32
End: 2022-08-08
Attending: OBSTETRICS & GYNECOLOGY
Payer: COMMERCIAL

## 2022-08-08 DIAGNOSIS — O99.810 ABNORMAL GLUCOSE TOLERANCE TEST IN PREGNANCY: ICD-10-CM

## 2022-08-08 LAB
GLUCOSE 1H P GLC SERPL-MCNC: 153 MG/DL
GLUCOSE 2H P GLC SERPL-MCNC: 125 MG/DL
GLUCOSE 3H P GLC SERPL-MCNC: 106 MG/DL (ref 70–140)
GLUCOSE P FAST SERPL-MCNC: 85 MG/DL

## 2022-08-08 PROCEDURE — 82952 GTT-ADDED SAMPLES: CPT

## 2022-08-08 PROCEDURE — 36415 COLL VENOUS BLD VENIPUNCTURE: CPT

## 2022-08-08 PROCEDURE — 82951 GLUCOSE TOLERANCE TEST (GTT): CPT

## 2022-08-30 ENCOUNTER — TELEPHONE (OUTPATIENT)
Dept: OBGYN CLINIC | Facility: CLINIC | Age: 32
End: 2022-08-30

## 2022-08-30 NOTE — TELEPHONE ENCOUNTER
G2/P 0010 GA 28 1/7 wks patient complaining of flashing lights in her right eye on Sunday. She checked her BP and it was 128/73 and 119/70. No further episodes like this. Reports cramping that is new and persistent today. Improves with hydration and rest. Worse with walking. She also has been having an increase in watery discharge. States it is a constant drip. Denies any irritation or odor. Discharge is sitting on top of undergarments; does not seem to be soaking anything. She also reports headaches. Has a history of migraines and the headaches do go away with rest.   Last OV: 8/4/22 jessica with Dr. Alexis Gallardo   Pregnancy Complications: hypothyroid, Covid in pregnancy, elevated BMI  Abdominal pain: see above  Leaking of fluid: increased discharge  Vaginal Bleeding: denies  Fetal Movement: +  Recommendations: discussed round ligament pain management, management of headaches in pregnancy and s/s of pre-eclampsia. Recommended hydration, rest, belly band, tylenol if needed. To monitor discharge. If gush of fluid or liquid that is soaking undergarment/pad, to call office. Questions answered and patient states understanding.

## 2022-09-10 ENCOUNTER — ROUTINE PRENATAL (OUTPATIENT)
Dept: OBGYN CLINIC | Facility: CLINIC | Age: 32
End: 2022-09-10
Payer: COMMERCIAL

## 2022-09-10 VITALS
BODY MASS INDEX: 33.91 KG/M2 | HEART RATE: 99 BPM | DIASTOLIC BLOOD PRESSURE: 62 MMHG | SYSTOLIC BLOOD PRESSURE: 118 MMHG | WEIGHT: 198.63 LBS | HEIGHT: 64 IN

## 2022-09-10 DIAGNOSIS — Z34.80 PRENATAL CARE OF MULTIGRAVIDA, ANTEPARTUM: Primary | ICD-10-CM

## 2022-09-10 LAB
GLUCOSE (URINE DIPSTICK): NEGATIVE MG/DL
MULTISTIX LOT#: NORMAL NUMERIC

## 2022-09-10 PROCEDURE — 3078F DIAST BP <80 MM HG: CPT | Performed by: NURSE PRACTITIONER

## 2022-09-10 PROCEDURE — 3008F BODY MASS INDEX DOCD: CPT | Performed by: NURSE PRACTITIONER

## 2022-09-10 PROCEDURE — 3074F SYST BP LT 130 MM HG: CPT | Performed by: NURSE PRACTITIONER

## 2022-09-10 PROCEDURE — 81002 URINALYSIS NONAUTO W/O SCOPE: CPT | Performed by: NURSE PRACTITIONER

## 2022-09-10 NOTE — PROGRESS NOTES
NGUYỄN  Doing well, come round ligament pain. Advised she she has any persistent or worsening pain to call. Consider maternity belt.   GOOD FM  Denies VB/LOF/uctx  TDAP received at another practice she saw last week- [facility]- she will bring the records  RTC in 2 wks  Fetal movement instructions given

## 2022-09-12 ENCOUNTER — LAB ENCOUNTER (OUTPATIENT)
Dept: LAB | Age: 32
End: 2022-09-12
Attending: NURSE PRACTITIONER
Payer: COMMERCIAL

## 2022-09-12 DIAGNOSIS — Z36.9 ANTENATAL SCREENING ENCOUNTER: ICD-10-CM

## 2022-09-12 DIAGNOSIS — Z34.80 PRENATAL CARE OF MULTIGRAVIDA, ANTEPARTUM: ICD-10-CM

## 2022-09-12 PROCEDURE — 36415 COLL VENOUS BLD VENIPUNCTURE: CPT

## 2022-09-12 PROCEDURE — 87389 HIV-1 AG W/HIV-1&-2 AB AG IA: CPT

## 2022-09-24 ENCOUNTER — ROUTINE PRENATAL (OUTPATIENT)
Dept: OBGYN CLINIC | Facility: CLINIC | Age: 32
End: 2022-09-24

## 2022-09-24 VITALS
SYSTOLIC BLOOD PRESSURE: 112 MMHG | WEIGHT: 199.81 LBS | BODY MASS INDEX: 34 KG/M2 | DIASTOLIC BLOOD PRESSURE: 74 MMHG | HEART RATE: 81 BPM

## 2022-09-24 DIAGNOSIS — Z34.00 SUPERVISION OF NORMAL FIRST PREGNANCY, ANTEPARTUM: Primary | ICD-10-CM

## 2022-09-24 DIAGNOSIS — E06.3 HYPOTHYROIDISM, ACQUIRED, AUTOIMMUNE: ICD-10-CM

## 2022-09-24 DIAGNOSIS — Z34.90 PRENATAL CARE, ANTEPARTUM: ICD-10-CM

## 2022-09-24 DIAGNOSIS — O98.512 COVID-19 AFFECTING PREGNANCY IN SECOND TRIMESTER: ICD-10-CM

## 2022-09-24 DIAGNOSIS — U07.1 COVID-19 AFFECTING PREGNANCY IN SECOND TRIMESTER: ICD-10-CM

## 2022-09-24 PROCEDURE — 81002 URINALYSIS NONAUTO W/O SCOPE: CPT | Performed by: OBSTETRICS & GYNECOLOGY

## 2022-09-24 PROCEDURE — 3078F DIAST BP <80 MM HG: CPT | Performed by: OBSTETRICS & GYNECOLOGY

## 2022-09-24 PROCEDURE — 3074F SYST BP LT 130 MM HG: CPT | Performed by: OBSTETRICS & GYNECOLOGY

## 2022-09-24 NOTE — PROGRESS NOTES
NGUYỄN    GA: 31w5d   22  0803   BP: 112/74   Pulse: 81   Weight: 199 lb 12.8 oz (90.6 kg)       Doing well, +FM  Denies LOF/VB/uctx  Rh +, TDAP received out [facility] EPDS 0  PTL and Fetal movement instructions given  Small rectal bleeding this week, +consitpation. Precautions provided. Constipation OTC remedies provided. Problem List Items Addressed This Visit        Obstetrical    Prenatal care, antepartum    Relevant Orders    URINALYSIS NONAUTO W/O SCOPE (OB URISTIX) (Completed)    COVID-19 affecting pregnancy in second trimester    Supervision of normal first pregnancy, antepartum - Primary       Endocrine    Hypothyroidism, acquired, autoimmune          RTC in 2 wks      Note to patient and family   The [facility] makes medical notes available to patients in the interest of transparency. However, please be advised that this is a medical document. It is intended as dzia-xo-uugo communication. It is written and medical language may contain abbreviations or verbiage that are technical and unfamiliar. It may appear blunt or direct. Medical documents are intended to carry relevant information, facts as evident, and the clinical opinion of the practitioner.

## 2022-09-30 ENCOUNTER — OFFICE VISIT (OUTPATIENT)
Dept: PERINATAL CARE | Facility: HOSPITAL | Age: 32
End: 2022-09-30
Attending: OBSTETRICS & GYNECOLOGY

## 2022-09-30 VITALS
WEIGHT: 200 LBS | BODY MASS INDEX: 34.15 KG/M2 | DIASTOLIC BLOOD PRESSURE: 82 MMHG | HEIGHT: 64 IN | SYSTOLIC BLOOD PRESSURE: 127 MMHG | HEART RATE: 71 BPM

## 2022-09-30 DIAGNOSIS — Z86.16 HISTORY OF COVID-19: ICD-10-CM

## 2022-09-30 DIAGNOSIS — O98.512 COVID-19 AFFECTING PREGNANCY IN SECOND TRIMESTER: ICD-10-CM

## 2022-09-30 DIAGNOSIS — E06.3 HASHIMOTO'S THYROIDITIS: ICD-10-CM

## 2022-09-30 DIAGNOSIS — U07.1 COVID-19 AFFECTING PREGNANCY IN SECOND TRIMESTER: ICD-10-CM

## 2022-09-30 DIAGNOSIS — E06.3 HYPOTHYROIDISM, ACQUIRED, AUTOIMMUNE: ICD-10-CM

## 2022-09-30 DIAGNOSIS — O99.213 OBESITY AFFECTING PREGNANCY IN THIRD TRIMESTER: ICD-10-CM

## 2022-09-30 DIAGNOSIS — O99.213 OBESITY AFFECTING PREGNANCY IN THIRD TRIMESTER: Primary | ICD-10-CM

## 2022-09-30 PROCEDURE — 76819 FETAL BIOPHYS PROFIL W/O NST: CPT

## 2022-09-30 PROCEDURE — 76816 OB US FOLLOW-UP PER FETUS: CPT | Performed by: OBSTETRICS & GYNECOLOGY

## 2022-10-03 PROBLEM — O99.210 OBESITY AFFECTING PREGNANCY, ANTEPARTUM: Status: ACTIVE | Noted: 2022-10-03

## 2022-10-03 PROBLEM — O99.210 OBESITY AFFECTING PREGNANCY, ANTEPARTUM (HCC): Status: ACTIVE | Noted: 2022-10-03

## 2022-10-06 ENCOUNTER — ROUTINE PRENATAL (OUTPATIENT)
Dept: OBGYN CLINIC | Facility: CLINIC | Age: 32
End: 2022-10-06
Payer: COMMERCIAL

## 2022-10-06 VITALS
HEART RATE: 90 BPM | WEIGHT: 202 LBS | SYSTOLIC BLOOD PRESSURE: 124 MMHG | BODY MASS INDEX: 35 KG/M2 | DIASTOLIC BLOOD PRESSURE: 76 MMHG

## 2022-10-06 DIAGNOSIS — Z34.90 PRENATAL CARE, ANTEPARTUM: ICD-10-CM

## 2022-10-06 DIAGNOSIS — O99.210 OBESITY AFFECTING PREGNANCY, ANTEPARTUM: ICD-10-CM

## 2022-10-06 DIAGNOSIS — O98.512 COVID-19 AFFECTING PREGNANCY IN SECOND TRIMESTER: ICD-10-CM

## 2022-10-06 DIAGNOSIS — Z34.00 SUPERVISION OF NORMAL FIRST PREGNANCY, ANTEPARTUM: Primary | ICD-10-CM

## 2022-10-06 DIAGNOSIS — E06.3 HYPOTHYROIDISM, ACQUIRED, AUTOIMMUNE: ICD-10-CM

## 2022-10-06 DIAGNOSIS — U07.1 COVID-19 AFFECTING PREGNANCY IN SECOND TRIMESTER: ICD-10-CM

## 2022-10-06 LAB
GLUCOSE (URINE DIPSTICK): NEGATIVE MG/DL
MULTISTIX LOT#: NORMAL NUMERIC

## 2022-10-06 PROCEDURE — 81002 URINALYSIS NONAUTO W/O SCOPE: CPT | Performed by: OBSTETRICS & GYNECOLOGY

## 2022-10-06 PROCEDURE — 3074F SYST BP LT 130 MM HG: CPT | Performed by: OBSTETRICS & GYNECOLOGY

## 2022-10-06 PROCEDURE — 3078F DIAST BP <80 MM HG: CPT | Performed by: OBSTETRICS & GYNECOLOGY

## 2022-10-06 NOTE — PROGRESS NOTES
NGUYỄN    GA: 33w3d   10/06/22  1749   BP: 124/76   Pulse: 90   Weight: 202 lb (91.6 kg)       Doing well, +FM  Denies LOF/VB/uctx  Rh +, TDAP received, EPDS0  PTL and Fetal movement instructions given      Problem List Items Addressed This Visit        Obstetrical    Prenatal care, antepartum    Relevant Orders    URINALYSIS NONAUTO W/O SCOPE (OB URISTIX) (Completed)    COVID-19 affecting pregnancy in second trimester    Supervision of normal first pregnancy, antepartum - Primary    Obesity affecting pregnancy, antepartum       Endocrine    Hypothyroidism, acquired, autoimmune          RTC in 2 wks      Note to patient and family   The [facility] makes medical notes available to patients in the interest of transparency. However, please be advised that this is a medical document. It is intended as fddq-iu-tffg communication. It is written and medical language may contain abbreviations or verbiage that are technical and unfamiliar. It may appear blunt or direct. Medical documents are intended to carry relevant information, facts as evident, and the clinical opinion of the practitioner.

## 2022-10-21 ENCOUNTER — HOSPITAL ENCOUNTER (OUTPATIENT)
Facility: HOSPITAL | Age: 32
Discharge: HOME | End: 2022-10-21
Attending: OBSTETRICS & GYNECOLOGY | Admitting: OBSTETRICS & GYNECOLOGY
Payer: COMMERCIAL

## 2022-10-21 ENCOUNTER — TELEPHONE (OUTPATIENT)
Dept: OBGYN CLINIC | Facility: CLINIC | Age: 32
End: 2022-10-21

## 2022-10-21 VITALS
SYSTOLIC BLOOD PRESSURE: 132 MMHG | TEMPERATURE: 98 F | HEART RATE: 81 BPM | DIASTOLIC BLOOD PRESSURE: 77 MMHG | RESPIRATION RATE: 16 BRPM

## 2022-10-21 LAB
ALBUMIN SERPL-MCNC: 2.8 G/DL (ref 3.4–5)
ALBUMIN/GLOB SERPL: 0.6 {RATIO} (ref 1–2)
ALP LIVER SERPL-CCNC: 298 U/L
ALT SERPL-CCNC: 29 U/L
ANION GAP SERPL CALC-SCNC: 9 MMOL/L (ref 0–18)
AST SERPL-CCNC: 21 U/L (ref 15–37)
BASOPHILS # BLD AUTO: 0.02 X10(3) UL (ref 0–0.2)
BASOPHILS NFR BLD AUTO: 0.2 %
BILIRUB SERPL-MCNC: 0.3 MG/DL (ref 0.1–2)
BUN BLD-MCNC: 9 MG/DL (ref 7–18)
CALCIUM BLD-MCNC: 9.4 MG/DL (ref 8.5–10.1)
CHLORIDE SERPL-SCNC: 105 MMOL/L (ref 98–112)
CO2 SERPL-SCNC: 21 MMOL/L (ref 21–32)
CREAT BLD-MCNC: 0.63 MG/DL
CREAT UR-SCNC: 178 MG/DL
EOSINOPHIL # BLD AUTO: 0.03 X10(3) UL (ref 0–0.7)
EOSINOPHIL NFR BLD AUTO: 0.3 %
ERYTHROCYTE [DISTWIDTH] IN BLOOD BY AUTOMATED COUNT: 13.1 %
FASTING STATUS PATIENT QL REPORTED: NO
GFR SERPLBLD BASED ON 1.73 SQ M-ARVRAT: 122 ML/MIN/1.73M2 (ref 60–?)
GLOBULIN PLAS-MCNC: 4.7 G/DL (ref 2.8–4.4)
GLUCOSE BLD-MCNC: 108 MG/DL (ref 70–99)
HCT VFR BLD AUTO: 38.1 %
HGB BLD-MCNC: 12.5 G/DL
IMM GRANULOCYTES # BLD AUTO: 0.06 X10(3) UL (ref 0–1)
IMM GRANULOCYTES NFR BLD: 0.5 %
LYMPHOCYTES # BLD AUTO: 1.94 X10(3) UL (ref 1–4)
LYMPHOCYTES NFR BLD AUTO: 16.8 %
MCH RBC QN AUTO: 29.2 PG (ref 26–34)
MCHC RBC AUTO-ENTMCNC: 32.8 G/DL (ref 31–37)
MCV RBC AUTO: 89 FL
MONOCYTES # BLD AUTO: 0.81 X10(3) UL (ref 0.1–1)
MONOCYTES NFR BLD AUTO: 7 %
NEUTROPHILS # BLD AUTO: 8.7 X10 (3) UL (ref 1.5–7.7)
NEUTROPHILS # BLD AUTO: 8.7 X10(3) UL (ref 1.5–7.7)
NEUTROPHILS NFR BLD AUTO: 75.2 %
OSMOLALITY SERPL CALC.SUM OF ELEC: 279 MOSM/KG (ref 275–295)
PLATELET # BLD AUTO: 292 10(3)UL (ref 150–450)
POTASSIUM SERPL-SCNC: 3.7 MMOL/L (ref 3.5–5.1)
PROT SERPL-MCNC: 7.5 G/DL (ref 6.4–8.2)
PROT UR-MCNC: 51.2 MG/DL
PROT/CREAT UR-RTO: 0.29
RBC # BLD AUTO: 4.28 X10(6)UL
SODIUM SERPL-SCNC: 135 MMOL/L (ref 136–145)
URATE SERPL-MCNC: 4.8 MG/DL
WBC # BLD AUTO: 11.6 X10(3) UL (ref 4–11)

## 2022-10-21 PROCEDURE — 84156 ASSAY OF PROTEIN URINE: CPT | Performed by: OBSTETRICS & GYNECOLOGY

## 2022-10-21 PROCEDURE — 59025 FETAL NON-STRESS TEST: CPT

## 2022-10-21 PROCEDURE — 85025 COMPLETE CBC W/AUTO DIFF WBC: CPT | Performed by: OBSTETRICS & GYNECOLOGY

## 2022-10-21 PROCEDURE — 82570 ASSAY OF URINE CREATININE: CPT | Performed by: OBSTETRICS & GYNECOLOGY

## 2022-10-21 PROCEDURE — 80053 COMPREHEN METABOLIC PANEL: CPT | Performed by: OBSTETRICS & GYNECOLOGY

## 2022-10-21 PROCEDURE — 99213 OFFICE O/P EST LOW 20 MIN: CPT

## 2022-10-21 PROCEDURE — 36415 COLL VENOUS BLD VENIPUNCTURE: CPT

## 2022-10-21 PROCEDURE — 84550 ASSAY OF BLOOD/URIC ACID: CPT | Performed by: OBSTETRICS & GYNECOLOGY

## 2022-10-21 NOTE — TELEPHONE ENCOUNTER
35w4d patient woke up with headache. Blood pressure was 133/88. Second reading 143/93. . headache and palpitations now. Denies nausea and vomiting or vision changes. Patient has been resting all day. Patient has taken several more readings all in the low 140s/low 90's. Advised patient to monitor for a couple more hours. If symptoms continue and BPs don't improve over the next couple of hours then I recommend patient go to L&D for eval for preeclampsia. Patient in agreement to plan and expressed understanding.

## 2022-10-21 NOTE — PROGRESS NOTES
Pt is a 33year old female admitted to TRG3/TRG3-A. Patient presents with:  R/o Pih     Pt is  35w4d intra-uterine pregnancy. History obtained, consents signed. Oriented to room, staff, and plan of care.   Pt is with C/o Rt frontal and rt orbital headache  Pt say she has H/o migranes does not take anymedication  Pt say she checked her BP at home and -140/90 at home  EFM and White Signal applied  Bp cuff cycling 15 min  PIH labs drawn and sent to lab  Will update  on lab results and BP soon

## 2022-10-21 NOTE — TELEPHONE ENCOUNTER
Pt calling re: high bp readings.     Future Appointments   Date Time Provider Patrick Carranza   10/28/2022  8:30 AM Marciano Medel MD [facility] [place]   11/4/2022  1:45 PM Kacy Charles MD [facility] [place]   11/11/2022  7:30 AM NAIF Penny [facility] [address]   11/18/2022  7:30 AM Kacy Charles MD [facility] [address]

## 2022-10-21 NOTE — NST
Nonstress Test   Patient: Shawanda Hernandez    Gestation: 35w4d    NST:       Variability: Moderate           Accelerations: Yes           Decelerations: None            Baseline: 135 BPM           Uterine Irritability: Yes           Contractions: Not present                                        Acoustic Stimulator: No           Nonstress Test Interpretation: Reactive           Nonstress Test Second Interpretation: Reactive          FHR Category: Category I          Additional Comments

## 2022-10-21 NOTE — PROGRESS NOTES
[facility] labs and Bp reviewed with Mikki Daly, orders received for 24 hour urine collection this weekend and follow up with OB in the office on Tuesday  Instruction given for 24 hours urine collection and pt to drop off urine in the outpatient lab on Monday morning, pt verbalizes understanding  Labor instruction given, pt going home in stable condition , pt not in active labor on discharge, All pt belongings sent with pt on discharge.

## 2022-10-23 ENCOUNTER — LAB ENCOUNTER (OUTPATIENT)
Dept: LAB | Facility: HOSPITAL | Age: 32
End: 2022-10-23
Attending: OBSTETRICS & GYNECOLOGY
Payer: COMMERCIAL

## 2022-10-23 PROCEDURE — 82570 ASSAY OF URINE CREATININE: CPT | Performed by: OBSTETRICS & GYNECOLOGY

## 2022-10-23 PROCEDURE — 84156 ASSAY OF PROTEIN URINE: CPT | Performed by: OBSTETRICS & GYNECOLOGY

## 2022-10-24 ENCOUNTER — TELEPHONE (OUTPATIENT)
Dept: OBGYN CLINIC | Facility: CLINIC | Age: 32
End: 2022-10-24

## 2022-10-24 LAB
CREAT UR-SCNC: 2.35 G/24 HR (ref 0.6–1.8)
M PROTEIN 24H UR ELPH-MRATE: 411.4 MG/24 HR (ref ?–149.1)
SPECIMEN VOL UR: 2200 ML
SPECIMEN VOL UR: 2200 ML

## 2022-10-24 NOTE — PROGRESS NOTES
Please contact patient. Please informed of elevated 24-hour urine protein. This could be a sign of preeclampsia. Therefore, patient requires closer monitoring. Please advise patient to return to the office tomorrow for blood pressure check. However, if patient is symptomatic for preeclampsia including nausea, vomiting, headache, vision changes and right upper quadrant/epigastric pain then please advise patient to report to triage for further evaluation. Continue blood pressure monitoring at home. Report to triage of elevated blood pressures noted.

## 2022-10-24 NOTE — PROGRESS NOTES
Contacted patient results and recommendations given. States has had a mild HA since Friday - BP's at home have been 140's/80's. Patient verbalized understanding and willingness to comply. No further questions.

## 2022-10-25 NOTE — PROGRESS NOTES
Patient was contacted yesterday. She reported mild headache. If patient symptomatic for preeclampsia including  nausea, vomiting, headache, vision changes and right upper quadrant/epigastric pain then please advise patient to report to triage for further evaluation. Patient should have been seen for above pressure check today or be evaluated in triage.

## 2022-10-28 ENCOUNTER — APPOINTMENT (OUTPATIENT)
Dept: OBGYN CLINIC | Facility: CLINIC | Age: 32
End: 2022-10-28
Payer: COMMERCIAL

## 2022-10-28 ENCOUNTER — ROUTINE PRENATAL (OUTPATIENT)
Dept: OBGYN CLINIC | Facility: CLINIC | Age: 32
End: 2022-10-28
Payer: COMMERCIAL

## 2022-10-28 VITALS — BODY MASS INDEX: 35 KG/M2 | DIASTOLIC BLOOD PRESSURE: 80 MMHG | SYSTOLIC BLOOD PRESSURE: 124 MMHG | WEIGHT: 204.19 LBS

## 2022-10-28 DIAGNOSIS — Z34.80 PRENATAL CARE OF MULTIGRAVIDA, ANTEPARTUM: Primary | ICD-10-CM

## 2022-10-28 DIAGNOSIS — O12.13 PROTEINURIA AFFECTING PREGNANCY IN THIRD TRIMESTER: ICD-10-CM

## 2022-10-28 LAB
GLUCOSE (URINE DIPSTICK): NEGATIVE MG/DL
MULTISTIX LOT#: NORMAL NUMERIC

## 2022-10-28 PROCEDURE — 87081 CULTURE SCREEN ONLY: CPT | Performed by: OBSTETRICS & GYNECOLOGY

## 2022-10-28 PROCEDURE — 87653 STREP B DNA AMP PROBE: CPT | Performed by: OBSTETRICS & GYNECOLOGY

## 2022-10-28 PROCEDURE — 3074F SYST BP LT 130 MM HG: CPT | Performed by: OBSTETRICS & GYNECOLOGY

## 2022-10-28 PROCEDURE — 81002 URINALYSIS NONAUTO W/O SCOPE: CPT | Performed by: OBSTETRICS & GYNECOLOGY

## 2022-10-28 PROCEDURE — 59025 FETAL NON-STRESS TEST: CPT | Performed by: OBSTETRICS & GYNECOLOGY

## 2022-10-28 PROCEDURE — 3079F DIAST BP 80-89 MM HG: CPT | Performed by: OBSTETRICS & GYNECOLOGY

## 2022-10-30 LAB — GROUP B STREP BY PCR FOR PCR OVT: NEGATIVE

## 2022-11-01 ENCOUNTER — TELEPHONE (OUTPATIENT)
Dept: OBGYN CLINIC | Facility: CLINIC | Age: 32
End: 2022-11-01

## 2022-11-01 ENCOUNTER — APPOINTMENT (OUTPATIENT)
Dept: ULTRASOUND IMAGING | Facility: HOSPITAL | Age: 32
End: 2022-11-01
Attending: OBSTETRICS & GYNECOLOGY
Payer: COMMERCIAL

## 2022-11-01 ENCOUNTER — HOSPITAL ENCOUNTER (OUTPATIENT)
Facility: HOSPITAL | Age: 32
Discharge: HOME | End: 2022-11-01
Attending: OBSTETRICS & GYNECOLOGY | Admitting: OBSTETRICS & GYNECOLOGY
Payer: COMMERCIAL

## 2022-11-01 VITALS
DIASTOLIC BLOOD PRESSURE: 78 MMHG | WEIGHT: 204 LBS | HEART RATE: 72 BPM | BODY MASS INDEX: 35.26 KG/M2 | SYSTOLIC BLOOD PRESSURE: 135 MMHG | TEMPERATURE: 97 F | HEIGHT: 63.75 IN | RESPIRATION RATE: 16 BRPM

## 2022-11-01 PROCEDURE — 76815 OB US LIMITED FETUS(S): CPT | Performed by: OBSTETRICS & GYNECOLOGY

## 2022-11-01 PROCEDURE — 59025 FETAL NON-STRESS TEST: CPT | Performed by: OBSTETRICS & GYNECOLOGY

## 2022-11-01 NOTE — TELEPHONE ENCOUNTER
TC through answering service. 2 movement in past two hours. Decreased fetal movement since yesterday. Recommend to [facility] triage for evaluation.

## 2022-11-02 NOTE — PROGRESS NOTES
Written and verbal discharge instructions given. Pt verbalized understanding. Pt ambulated off unit with  present. Pt alert and stable.

## 2022-11-02 NOTE — PROGRESS NOTES
admitted to triage 5 with  present. Pt ambulated to room, to bathroom, gown on, pt to bed. Pt states she has had decreased fetal movement since last night that continued today, has felt three movements in the past two hours. Pt denies lof, bleeding, cramping, headache, blurry vision, or epigastric pain. efm and toco applied. fhr present at 135.

## 2022-11-02 NOTE — DISCHARGE INSTRUCTIONS
Discharge Instructions    Diet: Regular  Activity: Normal activity         General Instructions    Call your [facility] doctor if: Fluid leaking from your vagina;Uterine contractions increasing in intensity and frequency;Vaginal bleeding;Vaginal or rectal pressure;Uterine contractions 10 minutes or closer for 1 to 2 hours;Decrease in fetal movement; Temperature greater than 100F  Early labor comfort measures: Drink fluids and eat small light meals;Relax, sleep, take a warm bath or shower for 30 minutes or less; Take a walk

## 2022-11-02 NOTE — PROGRESS NOTES
Patient complains of decreased fetal movement. Evaluated in [facility] triage. No leaking or bleeding per nursing. Reactive NST. Movement has improved since admission per patient. Check US DANISHA.   If normal, discharge home and follow up 11/4/22

## 2022-11-02 NOTE — NST
Nonstress Test   Patient: Shawanda Hernandez    Gestation: 37w1d    NST:       Variability: Moderate           Accelerations: Yes           Decelerations: None            Baseline: 130 BPM           Uterine Irritability: No           Contractions: Not present                                        Acoustic Stimulator: No           Nonstress Test Interpretation: Reactive                                 Additional Comments

## 2022-11-04 ENCOUNTER — ROUTINE PRENATAL (OUTPATIENT)
Dept: OBGYN CLINIC | Facility: CLINIC | Age: 32
End: 2022-11-04
Payer: COMMERCIAL

## 2022-11-04 ENCOUNTER — APPOINTMENT (OUTPATIENT)
Dept: OBGYN CLINIC | Facility: CLINIC | Age: 32
End: 2022-11-04
Payer: COMMERCIAL

## 2022-11-04 ENCOUNTER — LAB ENCOUNTER (OUTPATIENT)
Dept: LAB | Age: 32
End: 2022-11-04
Attending: OBSTETRICS & GYNECOLOGY
Payer: COMMERCIAL

## 2022-11-04 VITALS — SYSTOLIC BLOOD PRESSURE: 122 MMHG | BODY MASS INDEX: 36 KG/M2 | DIASTOLIC BLOOD PRESSURE: 74 MMHG | WEIGHT: 207 LBS

## 2022-11-04 DIAGNOSIS — O12.13 PROTEINURIA AFFECTING PREGNANCY IN THIRD TRIMESTER: ICD-10-CM

## 2022-11-04 DIAGNOSIS — Z34.80 PRENATAL CARE OF MULTIGRAVIDA, ANTEPARTUM: Primary | ICD-10-CM

## 2022-11-04 LAB
ALBUMIN SERPL-MCNC: 2.8 G/DL (ref 3.4–5)
ALBUMIN/GLOB SERPL: 0.7 {RATIO} (ref 1–2)
ALP LIVER SERPL-CCNC: 327 U/L
ALT SERPL-CCNC: 28 U/L
ANION GAP SERPL CALC-SCNC: 7 MMOL/L (ref 0–18)
AST SERPL-CCNC: 20 U/L (ref 15–37)
BASOPHILS # BLD AUTO: 0.02 X10(3) UL (ref 0–0.2)
BASOPHILS NFR BLD AUTO: 0.2 %
BILIRUB SERPL-MCNC: 0.3 MG/DL (ref 0.1–2)
BUN BLD-MCNC: 8 MG/DL (ref 7–18)
CALCIUM BLD-MCNC: 9.3 MG/DL (ref 8.5–10.1)
CHLORIDE SERPL-SCNC: 109 MMOL/L (ref 98–112)
CO2 SERPL-SCNC: 23 MMOL/L (ref 21–32)
CREAT BLD-MCNC: 0.58 MG/DL
CREAT UR-SCNC: 116 MG/DL
EOSINOPHIL # BLD AUTO: 0.03 X10(3) UL (ref 0–0.7)
EOSINOPHIL NFR BLD AUTO: 0.3 %
ERYTHROCYTE [DISTWIDTH] IN BLOOD BY AUTOMATED COUNT: 13.3 %
FASTING STATUS PATIENT QL REPORTED: NO
GFR SERPLBLD BASED ON 1.73 SQ M-ARVRAT: 124 ML/MIN/1.73M2 (ref 60–?)
GLOBULIN PLAS-MCNC: 4 G/DL (ref 2.8–4.4)
GLUCOSE (URINE DIPSTICK): NEGATIVE MG/DL
GLUCOSE BLD-MCNC: 82 MG/DL (ref 70–99)
HCT VFR BLD AUTO: 36.6 %
HGB BLD-MCNC: 12.4 G/DL
IMM GRANULOCYTES # BLD AUTO: 0.04 X10(3) UL (ref 0–1)
IMM GRANULOCYTES NFR BLD: 0.4 %
LYMPHOCYTES # BLD AUTO: 1.89 X10(3) UL (ref 1–4)
LYMPHOCYTES NFR BLD AUTO: 19.2 %
MCH RBC QN AUTO: 29.4 PG (ref 26–34)
MCHC RBC AUTO-ENTMCNC: 33.9 G/DL (ref 31–37)
MCV RBC AUTO: 86.7 FL
MONOCYTES # BLD AUTO: 0.66 X10(3) UL (ref 0.1–1)
MONOCYTES NFR BLD AUTO: 6.7 %
MULTISTIX LOT#: NORMAL NUMERIC
NEUTROPHILS # BLD AUTO: 7.2 X10 (3) UL (ref 1.5–7.7)
NEUTROPHILS # BLD AUTO: 7.2 X10(3) UL (ref 1.5–7.7)
NEUTROPHILS NFR BLD AUTO: 73.2 %
OSMOLALITY SERPL CALC.SUM OF ELEC: 285 MOSM/KG (ref 275–295)
PLATELET # BLD AUTO: 282 10(3)UL (ref 150–450)
POTASSIUM SERPL-SCNC: 4.3 MMOL/L (ref 3.5–5.1)
PROT SERPL-MCNC: 6.8 G/DL (ref 6.4–8.2)
PROT UR-MCNC: 33.7 MG/DL
PROT/CREAT UR-RTO: 0.29
RBC # BLD AUTO: 4.22 X10(6)UL
SODIUM SERPL-SCNC: 139 MMOL/L (ref 136–145)
WBC # BLD AUTO: 9.8 X10(3) UL (ref 4–11)

## 2022-11-04 PROCEDURE — 82570 ASSAY OF URINE CREATININE: CPT | Performed by: OBSTETRICS & GYNECOLOGY

## 2022-11-04 PROCEDURE — 81002 URINALYSIS NONAUTO W/O SCOPE: CPT | Performed by: OBSTETRICS & GYNECOLOGY

## 2022-11-04 PROCEDURE — 59025 FETAL NON-STRESS TEST: CPT | Performed by: OBSTETRICS & GYNECOLOGY

## 2022-11-04 PROCEDURE — 3074F SYST BP LT 130 MM HG: CPT | Performed by: OBSTETRICS & GYNECOLOGY

## 2022-11-04 PROCEDURE — 3078F DIAST BP <80 MM HG: CPT | Performed by: OBSTETRICS & GYNECOLOGY

## 2022-11-04 PROCEDURE — 84156 ASSAY OF PROTEIN URINE: CPT | Performed by: OBSTETRICS & GYNECOLOGY

## 2022-11-04 PROCEDURE — 85025 COMPLETE CBC W/AUTO DIFF WBC: CPT

## 2022-11-04 PROCEDURE — 36415 COLL VENOUS BLD VENIPUNCTURE: CPT

## 2022-11-04 PROCEDURE — 80053 COMPREHEN METABOLIC PANEL: CPT

## 2022-11-04 NOTE — PROGRESS NOTES
Patient presents with:  Prenatal Care: Kevin after nst      Routine prenatal visit. Patient denies any bleeding, leaking fluid, cramping, or regular uterine contractions. Movement has slowed down somewhat. Intermittent mild headaches not requiring medication. One brief episode flashing lights that resolved quickly. NST: see report. Reactive  Assessment/Plan:  37w4d doing well  GBS neg  Proteinuria with nl BP's- previous visits for decreased fetal movement and headache. Check repeat preeclamptic labs/urine Pr/Cr. Follow up Mon/Tues. Explained that I would recommend delivery if lab abnormalities, non reassuring fetal testing, severe headaches not relieved with tylenol or elevated blood pressures, but do not recommend delivery at this time due to early term. Kick counts reviewed. Reviewed labor signs and symptoms. Diagnoses and all orders for this visit:    Prenatal care of multigravida, antepartum  -     URINALYSIS NONAUTO W/O SCOPE (OB URISTIX)    Proteinuria affecting pregnancy in third trimester  -     FETAL NON-STRESS TEST [facility] ONLY 78893;  Future       Return in about 1 week (around 11/11/2022) for Routine Prenatal Visit, NST.

## 2022-11-07 ENCOUNTER — ROUTINE PRENATAL (OUTPATIENT)
Dept: OBGYN CLINIC | Facility: CLINIC | Age: 32
End: 2022-11-07
Payer: COMMERCIAL

## 2022-11-07 VITALS — WEIGHT: 207.81 LBS | SYSTOLIC BLOOD PRESSURE: 124 MMHG | DIASTOLIC BLOOD PRESSURE: 82 MMHG | BODY MASS INDEX: 36 KG/M2

## 2022-11-07 DIAGNOSIS — Z34.80 PRENATAL CARE OF MULTIGRAVIDA, ANTEPARTUM: Primary | ICD-10-CM

## 2022-11-07 DIAGNOSIS — O12.13 PROTEINURIA AFFECTING PREGNANCY IN THIRD TRIMESTER: ICD-10-CM

## 2022-11-07 DIAGNOSIS — Z34.00 SUPERVISION OF NORMAL FIRST PREGNANCY, ANTEPARTUM: ICD-10-CM

## 2022-11-07 LAB
GLUCOSE (URINE DIPSTICK): NEGATIVE MG/DL
MULTISTIX LOT#: NORMAL NUMERIC

## 2022-11-07 PROCEDURE — 3074F SYST BP LT 130 MM HG: CPT | Performed by: NURSE PRACTITIONER

## 2022-11-07 PROCEDURE — 3079F DIAST BP 80-89 MM HG: CPT | Performed by: NURSE PRACTITIONER

## 2022-11-07 PROCEDURE — 59025 FETAL NON-STRESS TEST: CPT | Performed by: NURSE PRACTITIONER

## 2022-11-07 PROCEDURE — 81002 URINALYSIS NONAUTO W/O SCOPE: CPT | Performed by: NURSE PRACTITIONER

## 2022-11-07 NOTE — PATIENT INSTRUCTIONS
FETAL MOVEMENT CHART    Although not all women need to perform daily fetal movement counts, if you notice a decrease in fetal activity, you should contact our office immediately. Begin counting fetal movements at 32 weeks of pregnancy. You may find that your baby is more active after eating or drinking. We want you to time how long it takes to feel 10 movements (kicks, flutters, swishes or rolls). You should feel at least 10 movements in 2 hours. You will likely feel 10 movements in less than 2 hours. If you have concerns, you can use the attached table to record movements. Record the time you feel the first movement and the tenth movement. This will help you observe patterns and discover how long it normally takes your baby to move 10 times. Please call us if any of the following occurs: You have not felt the baby move for a couple of hours  It is taking longer each day to get the tenth movement    The main point is we want you to know the characteristics of your baby, so you can tell the doctor or nurse if something different is happening. Again, if you notice a decrease in fetal movement, please give the office a call.     If our office is closed, the answering service will page the doctor on-call.    ------------------------------      Time M T W Th F S S                                                                                                                 Time M T W Th F S S                                                                                                           Time M T W Th F S S                                                                                                           Time M T W Th F S S

## 2022-11-07 NOTE — PROGRESS NOTES
NGUYỄN  Doing well, +FM  Denies VB/LOF/uctx  Mode of delivery:  anticipated- no change in symptoms. Normal PIH labs done a few days ago.   Labor precautions discussed  RTC 1 week with NST  NST reactive

## 2022-11-11 ENCOUNTER — APPOINTMENT (OUTPATIENT)
Dept: OBGYN CLINIC | Facility: CLINIC | Age: 32
End: 2022-11-11
Payer: COMMERCIAL

## 2022-11-11 ENCOUNTER — ROUTINE PRENATAL (OUTPATIENT)
Dept: OBGYN CLINIC | Facility: CLINIC | Age: 32
End: 2022-11-11
Payer: COMMERCIAL

## 2022-11-11 VITALS — DIASTOLIC BLOOD PRESSURE: 72 MMHG | SYSTOLIC BLOOD PRESSURE: 122 MMHG

## 2022-11-11 DIAGNOSIS — Z3A.38 38 WEEKS GESTATION OF PREGNANCY: ICD-10-CM

## 2022-11-11 DIAGNOSIS — Z34.00 SUPERVISION OF NORMAL FIRST PREGNANCY, ANTEPARTUM: Primary | ICD-10-CM

## 2022-11-11 DIAGNOSIS — U07.1 COVID-19 AFFECTING PREGNANCY IN SECOND TRIMESTER: ICD-10-CM

## 2022-11-11 DIAGNOSIS — O98.512 COVID-19 AFFECTING PREGNANCY IN SECOND TRIMESTER: ICD-10-CM

## 2022-11-11 DIAGNOSIS — O12.13 PROTEINURIA AFFECTING PREGNANCY IN THIRD TRIMESTER: ICD-10-CM

## 2022-11-11 DIAGNOSIS — E06.3 HYPOTHYROIDISM, ACQUIRED, AUTOIMMUNE: ICD-10-CM

## 2022-11-11 PROCEDURE — 81002 URINALYSIS NONAUTO W/O SCOPE: CPT | Performed by: OBSTETRICS & GYNECOLOGY

## 2022-11-11 PROCEDURE — 3074F SYST BP LT 130 MM HG: CPT | Performed by: OBSTETRICS & GYNECOLOGY

## 2022-11-11 PROCEDURE — 59025 FETAL NON-STRESS TEST: CPT | Performed by: OBSTETRICS & GYNECOLOGY

## 2022-11-11 PROCEDURE — 3078F DIAST BP <80 MM HG: CPT | Performed by: OBSTETRICS & GYNECOLOGY

## 2022-11-11 NOTE — PROGRESS NOTES
NGUYỄN    GA: 38w4d   22  1234   BP: 122/72       Doing well, +FM   Denies LOF/VB/uctx. +bh. Mode of delivery:  anticipated  SVE 0/50/-2   GBS neg  Fetal movement count given  Labor precautions provided   patient has appointment Monday with endocrinologist  Patient reports persistent decreased fetal movement. Reports 4-6 movements per 2 hourS. No change since last seen. NST reactive and reassuring. Patient reports she felt fetal movement during monitoring. Precautions provided. Proteinuria, patient reports persistent headache for the last week. Has not taken Tylenol. Denies N, V, vision changes and RUQ/Epigastric pain. Reports BP at home at times could be elevated to 140/90. No documented elevated blood pressures in the hospital or in the office. Patient advised to take Tylenol 1000 mg at home. Advised to report to labor and delivery if persistent headache noted. Preeclampsia precautions provided. Problem List Items Addressed This Visit        Endocrine and Metabolic    Hypothyroidism, acquired, autoimmune       Gravid and     Supervision of normal first pregnancy, antepartum - Primary    Proteinuria affecting pregnancy in third trimester       Infectious Diseases    COVID-19 affecting pregnancy in second trimester   Other Visit Diagnoses     38 weeks gestation of pregnancy                RTC 1 week W/ nst    NST reactive and reassuring           Note to patient and family   The [facility] makes medical notes available to patients in the interest of transparency. However, please be advised that this is a medical document. It is intended as teej-rg-mdrh communication. It is written and medical language may contain abbreviations or verbiage that are technical and unfamiliar. It may appear blunt or direct. Medical documents are intended to carry relevant information, facts as evident, and the clinical opinion of the practitioner.

## 2022-11-13 ENCOUNTER — HOSPITAL ENCOUNTER (INPATIENT)
Facility: HOSPITAL | Age: 32
LOS: 3 days | Discharge: HOME | End: 2022-11-16
Attending: OBSTETRICS & GYNECOLOGY | Admitting: OBSTETRICS & GYNECOLOGY
Payer: COMMERCIAL

## 2022-11-13 ENCOUNTER — MOBILE ENCOUNTER (OUTPATIENT)
Dept: OBGYN CLINIC | Facility: CLINIC | Age: 32
End: 2022-11-13

## 2022-11-13 PROBLEM — Z34.90 PREGNANCY: Status: ACTIVE | Noted: 2022-11-13

## 2022-11-13 PROBLEM — Z34.90 PREGNANCY (HCC): Status: ACTIVE | Noted: 2022-11-13

## 2022-11-13 RX ORDER — SODIUM CHLORIDE, SODIUM LACTATE, POTASSIUM CHLORIDE, CALCIUM CHLORIDE 600; 310; 30; 20 MG/100ML; MG/100ML; MG/100ML; MG/100ML
INJECTION, SOLUTION INTRAVENOUS CONTINUOUS
Status: DISCONTINUED | OUTPATIENT
Start: 2022-11-13 | End: 2022-11-16

## 2022-11-13 RX ORDER — HYDROMORPHONE HYDROCHLORIDE 1 MG/ML
1 INJECTION, SOLUTION INTRAMUSCULAR; INTRAVENOUS; SUBCUTANEOUS EVERY 2 HOUR PRN
Status: DISCONTINUED | OUTPATIENT
Start: 2022-11-13 | End: 2022-11-16

## 2022-11-13 RX ORDER — DEXTROSE, SODIUM CHLORIDE, SODIUM LACTATE, POTASSIUM CHLORIDE, AND CALCIUM CHLORIDE 5; .6; .31; .03; .02 G/100ML; G/100ML; G/100ML; G/100ML; G/100ML
INJECTION, SOLUTION INTRAVENOUS AS NEEDED
Status: DISCONTINUED | OUTPATIENT
Start: 2022-11-13 | End: 2022-11-16

## 2022-11-13 RX ORDER — ONDANSETRON 2 MG/ML
4 INJECTION INTRAMUSCULAR; INTRAVENOUS EVERY 6 HOURS PRN
Status: DISCONTINUED | OUTPATIENT
Start: 2022-11-13 | End: 2022-11-16

## 2022-11-13 RX ORDER — AMMONIA INHALANTS 0.04 G/.3ML
0.3 INHALANT RESPIRATORY (INHALATION) AS NEEDED
Status: DISCONTINUED | OUTPATIENT
Start: 2022-11-13 | End: 2022-11-16

## 2022-11-13 RX ORDER — ACETAMINOPHEN 500 MG
500 TABLET ORAL EVERY 6 HOURS PRN
Status: DISCONTINUED | OUTPATIENT
Start: 2022-11-13 | End: 2022-11-14

## 2022-11-13 RX ORDER — TRISODIUM CITRATE DIHYDRATE AND CITRIC ACID MONOHYDRATE 500; 334 MG/5ML; MG/5ML
30 SOLUTION ORAL AS NEEDED
Status: DISCONTINUED | OUTPATIENT
Start: 2022-11-13 | End: 2022-11-16

## 2022-11-13 RX ORDER — IBUPROFEN 600 MG/1
600 TABLET ORAL EVERY 6 HOURS PRN
Status: ACTIVE | OUTPATIENT
Start: 2022-11-13 | End: 2022-11-15

## 2022-11-13 RX ORDER — TERBUTALINE SULFATE 1 MG/ML
0.25 INJECTION, SOLUTION SUBCUTANEOUS AS NEEDED
Status: DISCONTINUED | OUTPATIENT
Start: 2022-11-13 | End: 2022-11-16

## 2022-11-14 LAB
ALBUMIN SERPL-MCNC: 2.8 G/DL (ref 3.4–5)
ALBUMIN/GLOB SERPL: 0.6 {RATIO} (ref 1–2)
ALP LIVER SERPL-CCNC: 341 U/L
ALT SERPL-CCNC: 31 U/L
ANION GAP SERPL CALC-SCNC: 6 MMOL/L (ref 0–18)
ANTIBODY SCREEN: NEGATIVE
AST SERPL-CCNC: 24 U/L (ref 15–37)
BASOPHILS # BLD AUTO: 0.01 X10(3) UL (ref 0–0.2)
BASOPHILS NFR BLD AUTO: 0.1 %
BILIRUB SERPL-MCNC: 0.2 MG/DL (ref 0.1–2)
BUN BLD-MCNC: 15 MG/DL (ref 7–18)
CALCIUM BLD-MCNC: 9.1 MG/DL (ref 8.5–10.1)
CHLORIDE SERPL-SCNC: 109 MMOL/L (ref 98–112)
CO2 SERPL-SCNC: 23 MMOL/L (ref 21–32)
CREAT BLD-MCNC: 0.68 MG/DL
EOSINOPHIL # BLD AUTO: 0.05 X10(3) UL (ref 0–0.7)
EOSINOPHIL NFR BLD AUTO: 0.5 %
ERYTHROCYTE [DISTWIDTH] IN BLOOD BY AUTOMATED COUNT: 13.6 %
GFR SERPLBLD BASED ON 1.73 SQ M-ARVRAT: 119 ML/MIN/1.73M2 (ref 60–?)
GLOBULIN PLAS-MCNC: 4.5 G/DL (ref 2.8–4.4)
GLUCOSE BLD-MCNC: 113 MG/DL (ref 70–99)
HCT VFR BLD AUTO: 39.2 %
HGB BLD-MCNC: 13.1 G/DL
IMM GRANULOCYTES # BLD AUTO: 0.04 X10(3) UL (ref 0–1)
IMM GRANULOCYTES NFR BLD: 0.4 %
LYMPHOCYTES # BLD AUTO: 2.35 X10(3) UL (ref 1–4)
LYMPHOCYTES NFR BLD AUTO: 25.1 %
MCH RBC QN AUTO: 29.4 PG (ref 26–34)
MCHC RBC AUTO-ENTMCNC: 33.4 G/DL (ref 31–37)
MCV RBC AUTO: 87.9 FL
MONOCYTES # BLD AUTO: 0.6 X10(3) UL (ref 0.1–1)
MONOCYTES NFR BLD AUTO: 6.4 %
NEUTROPHILS # BLD AUTO: 6.33 X10 (3) UL (ref 1.5–7.7)
NEUTROPHILS # BLD AUTO: 6.33 X10(3) UL (ref 1.5–7.7)
NEUTROPHILS NFR BLD AUTO: 67.5 %
OSMOLALITY SERPL CALC.SUM OF ELEC: 288 MOSM/KG (ref 275–295)
PLATELET # BLD AUTO: 271 10(3)UL (ref 150–450)
POTASSIUM SERPL-SCNC: 4 MMOL/L (ref 3.5–5.1)
PROT SERPL-MCNC: 7.3 G/DL (ref 6.4–8.2)
RBC # BLD AUTO: 4.46 X10(6)UL
RH BLOOD TYPE: POSITIVE
SARS-COV-2 RNA RESP QL NAA+PROBE: NOT DETECTED
SODIUM SERPL-SCNC: 138 MMOL/L (ref 136–145)
T PALLIDUM AB SER QL IA: NONREACTIVE
URATE SERPL-MCNC: 4.4 MG/DL
WBC # BLD AUTO: 9.4 X10(3) UL (ref 4–11)

## 2022-11-14 PROCEDURE — 0HQ9XZZ REPAIR PERINEUM SKIN, EXTERNAL APPROACH: ICD-10-PCS | Performed by: OBSTETRICS & GYNECOLOGY

## 2022-11-14 PROCEDURE — 3E0DXGC INTRODUCTION OF OTHER THERAPEUTIC SUBSTANCE INTO MOUTH AND PHARYNX, EXTERNAL APPROACH: ICD-10-PCS | Performed by: OBSTETRICS & GYNECOLOGY

## 2022-11-14 PROCEDURE — 0UQMXZZ REPAIR VULVA, EXTERNAL APPROACH: ICD-10-PCS | Performed by: OBSTETRICS & GYNECOLOGY

## 2022-11-14 PROCEDURE — 59400 OBSTETRICAL CARE: CPT | Performed by: OBSTETRICS & GYNECOLOGY

## 2022-11-14 RX ORDER — ACETAMINOPHEN 500 MG
1000 TABLET ORAL EVERY 6 HOURS PRN
Status: DISCONTINUED | OUTPATIENT
Start: 2022-11-14 | End: 2022-11-16

## 2022-11-14 RX ORDER — BISACODYL 10 MG
10 SUPPOSITORY, RECTAL RECTAL ONCE AS NEEDED
Status: DISCONTINUED | OUTPATIENT
Start: 2022-11-14 | End: 2022-11-16

## 2022-11-14 RX ORDER — ACETAMINOPHEN 500 MG
500 TABLET ORAL EVERY 6 HOURS PRN
Status: DISCONTINUED | OUTPATIENT
Start: 2022-11-14 | End: 2022-11-16

## 2022-11-14 RX ORDER — LEVOTHYROXINE SODIUM 0.07 MG/1
75 TABLET ORAL
Status: DISCONTINUED | OUTPATIENT
Start: 2022-11-14 | End: 2022-11-16

## 2022-11-14 RX ORDER — DOCUSATE SODIUM 100 MG/1
100 CAPSULE, LIQUID FILLED ORAL
Status: DISCONTINUED | OUTPATIENT
Start: 2022-11-14 | End: 2022-11-16

## 2022-11-14 RX ORDER — SIMETHICONE 80 MG
80 TABLET,CHEWABLE ORAL 3 TIMES DAILY PRN
Status: DISCONTINUED | OUTPATIENT
Start: 2022-11-14 | End: 2022-11-16

## 2022-11-14 RX ORDER — BUPIVACAINE HCL/0.9 % NACL/PF 0.25 %
5 PLASTIC BAG, INJECTION (ML) EPIDURAL AS NEEDED
Status: DISCONTINUED | OUTPATIENT
Start: 2022-11-14 | End: 2022-11-16

## 2022-11-14 RX ORDER — IBUPROFEN 600 MG/1
600 TABLET ORAL EVERY 6 HOURS
Status: DISCONTINUED | OUTPATIENT
Start: 2022-11-14 | End: 2022-11-16

## 2022-11-14 RX ORDER — NALBUPHINE HCL 10 MG/ML
2.5 AMPUL (ML) INJECTION
Status: DISCONTINUED | OUTPATIENT
Start: 2022-11-14 | End: 2022-11-16

## 2022-11-14 NOTE — PLAN OF CARE
Problem: BIRTH - VAGINAL/ SECTION  Goal: Fetal and maternal status remain reassuring during the birth process  Description: INTERVENTIONS:  - Monitor vital signs  - Monitor fetal heart rate  - Monitor uterine activity  - Monitor labor progression (vaginal delivery)  - DVT prophylaxis (C/S delivery)  - Surgical antibiotic prophylaxis (C/S delivery)  2022 by Nadia Bellamy RN  Outcome: Progressing  2022 by Nadia Bellamy RN  Outcome: Progressing     Problem: PAIN - ADULT  Goal: Verbalizes/displays adequate comfort level or patient's stated pain goal  Description: INTERVENTIONS:  - Encourage pt to monitor pain and request assistance  - Assess pain using appropriate pain scale  - Administer analgesics based on type and severity of pain and evaluate response  - Implement non-pharmacological measures as appropriate and evaluate response  - Consider cultural and social influences on pain and pain management  - Manage/alleviate anxiety  - Utilize distraction and/or relaxation techniques  - Monitor for opioid side effects  - Notify MD/LIP if interventions unsuccessful or patient reports new pain  - Anticipate increased pain with activity and pre-medicate as appropriate  2022 by Nadia Bellamy RN  Outcome: Progressing  2022 by Nadia Bellamy RN  Outcome: Progressing     Problem: ANXIETY  Goal: Will report anxiety at manageable levels  Description: INTERVENTIONS:  - Administer medication as ordered  - Teach and rehearse alternative coping skills  - Provide emotional support with 1:1 interaction with staff  2022 by Nadia Bellamy RN  Outcome: Progressing  2022 by Nadia Bellamy RN  Outcome: Progressing     Problem: Patient/Family Goals  Goal: Patient/Family Long Term Goal  Description: Patient's Long Term Goal: To have a healthy baby.      Interventions:    - See additional Care Plan goals for specific interventions  2022 by Jen Mitchel Torres RN  Outcome: Progressing  11/14/2022 0014 by Maisha Hayes RN  Outcome: Progressing  Goal: Patient/Family Short Term Goal  Description: Patient's Short Term Goal: Adequate pain control.      Interventions:     - See additional Care Plan goals for specific interventions  11/14/2022 0016 by Maisha Hayes RN  Outcome: Progressing  11/14/2022 0014 by Maisha Hayes RN  Outcome: Progressing

## 2022-11-14 NOTE — PROGRESS NOTES
Pt is a 33year old female admitted to TRG5/TRG5-A. Patient presents with:  R/o Rom: Gush of clear fluid at 1930, followed on & off by further leaking; denies bleeding, denies regular or painful ctxs, reports +FM. Pt w/ HAs recently, proteinuria, & some mildly elevated BPs, denies any recent changes; denies visual changes, n/v, or epigastric pain. Pt is  38w6d intra-uterine pregnancy. History obtained. Oriented to room, staff, and plan of care.

## 2022-11-14 NOTE — PROGRESS NOTES
Returned patient's page. Patient reports leakage of fluid initially with a gush of fluid followed by persistent trickling of fluid for the past half hour. Patient denies any contractions or vaginal bleeding. Reports good fetal movement. Patient advised to report to labor and delivery for further evaluation. Patient agreeable. All questions and concerns were addressed.

## 2022-11-14 NOTE — L&D DELIVERY NOTE
David Girl [[id number]]    Labor Events     labor?: No   steroids?: None  Cervical ripening type: Misoprostol  Antibiotics received during labor?: No  Antibiotics (enter # doses in comment): none  Rupture date/time: 2022 193     Rupture type: SROM  Fluid color: Clear  Induction: None  Augmentation: None  Intrapartum & labor complications: Variable decelerations     Labor Event Times    Labor onset date/time: 2022 0300  Dilation complete date/time: 202255  Start pushing date/time: 2022 0554     Troy Presentation    Presentation: Vertex  Position: Left Occiput Anterior     Operative Delivery    Operative Vaginal Delivery: No            Shoulder Dystocia    Shoulder Dystocia: No     Anesthesia    Method: None           Delivery    Head delivery date/time: 2022 06:00:45   Delivery date/time:  22 06:01:08   Delivery type: Normal spontaneous vaginal delivery    Details:     Delivery location: delivery room     Delivery Providers    Delivering Clinician: Jesse Mitchell MD   Delivery personnel:  Provider Role   Yasmine Cadet RN Baby Nurse   Bakari Zepeda, RN Delivery Nurse         Cord    Vessels: 3 Vessels  Complications: None  Timed cord clamping: Yes  Time in sec: 30  Cord blood disposition: to lab     Resuscitation    Method: None      Measurements    Weight: 2870 g 6 lb 5.2 oz Length: 47 cm   Head circum.: 31.5 cm Chest circum.: 31 cm      Abdominal circum. : 29 cm       Placenta    Date/time: 2022 0604  Removal: Spontaneous  Disposition: Lab     Apgars    Living status: Living   Apgar Scoring Key:    0 1 2    Skin color Blue or pale Acrocyanotic Completely pink    Heart rate Absent <100 bpm >100 bpm    Reflex irritability No response Grimace Cry or active withdrawal    Muscle tone Limp Some flexion Active motion    Respiratory effort Absent Weak cry; hypoventilation Good, crying              1 Minute:  5 Minute:  10 Minute:  15 Minute:  20 Minute:    Skin color: 1  1       Heart rate: 2  2       Reflex irritablity: 2  2       Muscle tone: 2  2       Respiratory effort: 2  2       Total: 9  9          Apgars assigned by: Haley Leary   disposition: with mother     Skin to Skin    No data filed     Vaginal Count    Initial count RN: Dulce Smith RN  Initial count Tech: Draguniene, Ausra   Sponges   Sharps    Initial counts 11   0    Final counts 11   3    Final count RN: Dulce Smith RN  Final count MD: Aaliyah Jackson MD     Delivery (Maternal)    Episiotomy: None  Perineal lacerations: None    Periurethral laceration: right Repaired?: Yes   Labial laceration: left Repaired?: Yes   Vaginal laceration?: No    Cervical laceration?: No    Clitoral laceration?: No            Vaginal Delivery Note          Shawanda Hernandez Patient Status:  Inpatient    1990 MRN [id number]   Location [address] [place] [address] Attending Aaliyah Jackson MD   Hosp Day # 1 PCP Kt Worrell MD     Date of Delivery: 22    Pre Op Dx:  IUP at Term    Post Op Dx: Same - delivered    Op: Normal Spontaneous Vaginal Delivery    Surgeon: Maryuri Leija MD        Anesthesia: local     EBL:  150 cc    Indications:  Patient is a 33year old  at 39w0d who presented for PROM. She received 1 dose of buccal Cytotec. She subsequently requested epidural and was noted to be 10/100/+2 on exam after she reported increased pain prior to placement of epidural. Therefore, decision made to proceed with delivery. Findings:    Sex: female \"Lady\"     Weight:2870 g (6 lb 5.2 oz)      Apgars: 9/9      Lacerations: no perineal laceration, right periurethral, left labial with minimal extension to vaginal mucosal, no cervical       Procedure: The patients was placed in the dorsolithotomy position and prepped. She was encouraged to push.   As the head was delivered in GELACIO position, the legs were lowered and the perineum was supported to decrease the risk of tearing. The shoulders rotated easily and delivery was completed without complication. Bulb suction was performed. The cord was doubly clamped then cut after 30 seconds of cord pulsation noted. The baby was placed on the mother's abdomen at her request. The cord blood was sampled. Placenta delivered spontaneosly by uterine massage. IV Pitocin was then initiated. The uterus was not explored. Uterus noted to be firm. Good hemostasis noted. The perineum, vaginal mucosa and cervix was then examined. Local anesthesia was infiltrated along the area of the lacerations with confirmation of non-intravascular injection and total amount used was 15 ml. The right periurethral and then left labial with minimal extension to vaginal mucosal were repaired with 2-0 rapide vicryl interrupted sutures. Bleeding was minimal.  The patient was then moved to the supine position in stable condition. Sponge and instrument counts were correct. Complications:  None     Mother and infant in good condition. Darion Bird MD   [facility]            Note to patient and family   The [facility] makes medical notes available to patients in the interest of transparency. However, please be advised that this is a medical document. It is intended as pmtk-jh-ffeg communication. It is written and medical language may contain abbreviations or verbiage that are technical and unfamiliar. It may appear blunt or direct. Medical documents are intended to carry relevant information, facts as evident, and the clinical opinion of the practitioner.

## 2022-11-14 NOTE — PLAN OF CARE
Problem: BIRTH - VAGINAL/ SECTION  Goal: Fetal and maternal status remain reassuring during the birth process  Description: INTERVENTIONS:  - Monitor vital signs  - Monitor fetal heart rate  - Monitor uterine activity  - Monitor labor progression (vaginal delivery)  - DVT prophylaxis (C/S delivery)  - Surgical antibiotic prophylaxis (C/S delivery)  2022 by Severa Breen, RN  Outcome: Completed  2022 by Severa Breen, RN  Outcome: Progressing  2022 by Severa Breen, RN  Outcome: Progressing     Problem: PAIN - ADULT  Goal: Verbalizes/displays adequate comfort level or patient's stated pain goal  Description: INTERVENTIONS:  - Encourage pt to monitor pain and request assistance  - Assess pain using appropriate pain scale  - Administer analgesics based on type and severity of pain and evaluate response  - Implement non-pharmacological measures as appropriate and evaluate response  - Consider cultural and social influences on pain and pain management  - Manage/alleviate anxiety  - Utilize distraction and/or relaxation techniques  - Monitor for opioid side effects  - Notify MD/LIP if interventions unsuccessful or patient reports new pain  - Anticipate increased pain with activity and pre-medicate as appropriate  2022 by Severa Breen, RN  Outcome: Completed  2022 by Severa Breen, RN  Outcome: Progressing  2022 by Severa Breen, RN  Outcome: Progressing     Problem: ANXIETY  Goal: Will report anxiety at manageable levels  Description: INTERVENTIONS:  - Administer medication as ordered  - Teach and rehearse alternative coping skills  - Provide emotional support with 1:1 interaction with staff  2022 by Severa Breen, RN  Outcome: Completed  2022 by Severa Breen, RN  Outcome: Progressing  2022 by Severa Breen, RN  Outcome: Progressing     Problem: Patient/Family Goals  Goal: Patient/Family Long Term Goal  Description: Patient's Long Term Goal: To have a healthy baby. Interventions:    - See additional Care Plan goals for specific interventions  11/14/2022 0659 by Leon Ku RN  Outcome: Completed  11/14/2022 0016 by Leon Ku RN  Outcome: Progressing  11/14/2022 0014 by Leon Ku RN  Outcome: Progressing  Goal: Patient/Family Short Term Goal  Description: Patient's Short Term Goal: Adequate pain control.      Interventions:     - See additional Care Plan goals for specific interventions  11/14/2022 0659 by Leon Ku RN  Outcome: Completed  11/14/2022 0016 by Leon Ku RN  Outcome: Progressing  11/14/2022 0014 by Leon Ku RN  Outcome: Progressing

## 2022-11-14 NOTE — PLAN OF CARE
Problem: POSTPARTUM  Goal: Establishment of adequate milk supply with medication/procedure interruptions  Description: INTERVENTIONS:  - Review techniques for milk expression (breast pumping). - Provide pumping equipment/supplies, instructions, and assistance until it is safe to breastfeed infant. Outcome: Progressing  Goal: Experiences normal breast weaning course  Description: INTERVENTIONS:  - Assess for and manage engorgement. - Instruct on breast care. - Provide comfort measures. Outcome: Progressing  Goal: Appropriate maternal -  bonding  Description: INTERVENTIONS:  - Assess caregiver- interactions. - Assess caregiver's emotional status and coping mechanisms. - Encourage caregiver to participate in  daily care. - Assess support systems available to mother/family.  - Provide /case management support as needed.   Outcome: Progressing

## 2022-11-14 NOTE — PROGRESS NOTES
Pt is a 33year old female admitted to 105/-A. Patient presents with:  R/o Rom: Gush of clear fluid at 1930, followed on & off by further leaking; denies bleeding, denies regular or painful ctxs, reports +FM. Pt w/ HAs recently, proteinuria, & some mildly elevated BPs, denies any recent changes; denies visual changes, n/v, or epigastric pain. Pt is  39w0d intra-uterine pregnancy. History obtained, consents signed. Oriented to room, staff, and plan of care.

## 2022-11-15 LAB
BASOPHILS # BLD AUTO: 0.02 X10(3) UL (ref 0–0.2)
BASOPHILS NFR BLD AUTO: 0.2 %
EOSINOPHIL # BLD AUTO: 0.05 X10(3) UL (ref 0–0.7)
EOSINOPHIL NFR BLD AUTO: 0.5 %
ERYTHROCYTE [DISTWIDTH] IN BLOOD BY AUTOMATED COUNT: 14 %
HCT VFR BLD AUTO: 34.9 %
HGB BLD-MCNC: 11.4 G/DL
IMM GRANULOCYTES # BLD AUTO: 0.05 X10(3) UL (ref 0–1)
IMM GRANULOCYTES NFR BLD: 0.5 %
LYMPHOCYTES # BLD AUTO: 2.98 X10(3) UL (ref 1–4)
LYMPHOCYTES NFR BLD AUTO: 29 %
MCH RBC QN AUTO: 29 PG (ref 26–34)
MCHC RBC AUTO-ENTMCNC: 32.7 G/DL (ref 31–37)
MCV RBC AUTO: 88.8 FL
MONOCYTES # BLD AUTO: 0.6 X10(3) UL (ref 0.1–1)
MONOCYTES NFR BLD AUTO: 5.8 %
NEUTROPHILS # BLD AUTO: 6.57 X10 (3) UL (ref 1.5–7.7)
NEUTROPHILS # BLD AUTO: 6.57 X10(3) UL (ref 1.5–7.7)
NEUTROPHILS NFR BLD AUTO: 64 %
PLATELET # BLD AUTO: 237 10(3)UL (ref 150–450)
RBC # BLD AUTO: 3.93 X10(6)UL
WBC # BLD AUTO: 10.3 X10(3) UL (ref 4–11)

## 2022-11-15 RX ORDER — IBUPROFEN 600 MG/1
600 TABLET ORAL EVERY 6 HOURS PRN
Qty: 30 TABLET | Refills: 0 | Status: SHIPPED | OUTPATIENT
Start: 2022-11-15 | End: 2022-11-16

## 2022-11-15 RX ORDER — PSEUDOEPHEDRINE HCL 30 MG
100 TABLET ORAL 2 TIMES DAILY
Qty: 60 CAPSULE | Refills: 0 | Status: SHIPPED | OUTPATIENT
Start: 2022-11-15 | End: 2022-11-16

## 2022-11-15 NOTE — PLAN OF CARE
Problem: POSTPARTUM  Goal: Establishment of adequate milk supply with medication/procedure interruptions  Description: INTERVENTIONS:  - Review techniques for milk expression (breast pumping). - Provide pumping equipment/supplies, instructions, and assistance until it is safe to breastfeed infant. Outcome: Progressing  Goal: Appropriate maternal -  bonding  Description: INTERVENTIONS:  - Assess caregiver- interactions. - Assess caregiver's emotional status and coping mechanisms. - Encourage caregiver to participate in  daily care. - Assess support systems available to mother/family.  - Provide /case management support as needed.   Outcome: Progressing

## 2022-11-16 VITALS
TEMPERATURE: 98 F | BODY MASS INDEX: 36.68 KG/M2 | HEIGHT: 63 IN | WEIGHT: 207 LBS | SYSTOLIC BLOOD PRESSURE: 119 MMHG | HEART RATE: 69 BPM | DIASTOLIC BLOOD PRESSURE: 65 MMHG | RESPIRATION RATE: 16 BRPM

## 2022-11-16 RX ORDER — IBUPROFEN 600 MG/1
600 TABLET ORAL EVERY 6 HOURS PRN
Qty: 30 TABLET | Refills: 0 | Status: SHIPPED | OUTPATIENT
Start: 2022-11-16

## 2022-11-16 RX ORDER — PSEUDOEPHEDRINE HCL 30 MG
100 TABLET ORAL 2 TIMES DAILY PRN
Qty: 30 CAPSULE | Refills: 0 | Status: SHIPPED | OUTPATIENT
Start: 2022-11-16

## 2022-11-16 NOTE — PLAN OF CARE
Problem: POSTPARTUM  Goal: Establishment of adequate milk supply with medication/procedure interruptions  Description: INTERVENTIONS:  - Review techniques for milk expression (breast pumping). - Provide pumping equipment/supplies, instructions, and assistance until it is safe to breastfeed infant. Outcome: Adequate for Discharge  Goal: Appropriate maternal -  bonding  Description: INTERVENTIONS:  - Assess caregiver- interactions. - Assess caregiver's emotional status and coping mechanisms. - Encourage caregiver to participate in  daily care. - Assess support systems available to mother/family.  - Provide /case management support as needed.   Outcome: Adequate for Discharge

## 2022-11-16 NOTE — PROGRESS NOTES
Patient up and about, AOx4, VSS, seen by OB, ok to go home, postpartum care instructions reviewed and completed and patient will schedule follow up appointment as instructed, patient signed paper, mich and kisses off

## 2022-11-16 NOTE — PLAN OF CARE
Problem: POSTPARTUM  Goal: Establishment of adequate milk supply with medication/procedure interruptions  Description: INTERVENTIONS:  - Review techniques for milk expression (breast pumping). - Provide pumping equipment/supplies, instructions, and assistance until it is safe to breastfeed infant. 11/15/2022 1938 by Gene Martinez RN  Outcome: Progressing  11/15/2022 0805 by Gene Martinez RN  Outcome: Progressing  Goal: Appropriate maternal -  bonding  Description: INTERVENTIONS:  - Assess caregiver- interactions. - Assess caregiver's emotional status and coping mechanisms. - Encourage caregiver to participate in  daily care. - Assess support systems available to mother/family.  - Provide /case management support as needed.   11/15/2022 1938 by Gene Martinez RN  Outcome: Progressing  11/15/2022 0805 by Gene Martinez RN  Outcome: Progressing

## 2022-11-16 NOTE — PLAN OF CARE
Problem: POSTPARTUM  Goal: Establishment of adequate milk supply with medication/procedure interruptions  Description: INTERVENTIONS:  - Review techniques for milk expression (breast pumping). - Provide pumping equipment/supplies, instructions, and assistance until it is safe to breastfeed infant. Outcome: Completed  Goal: Appropriate maternal -  bonding  Description: INTERVENTIONS:  - Assess caregiver- interactions. - Assess caregiver's emotional status and coping mechanisms. - Encourage caregiver to participate in  daily care. - Assess support systems available to mother/family.  - Provide /case management support as needed.   Outcome: Completed

## 2022-11-16 NOTE — PLAN OF CARE
Problem: POSTPARTUM  Goal: Establishment of adequate milk supply with medication/procedure interruptions  Description: INTERVENTIONS:  - Review techniques for milk expression (breast pumping). - Provide pumping equipment/supplies, instructions, and assistance until it is safe to breastfeed infant. 11/15/2022 2348 by Jeanine Sweeney RN  Outcome: Adequate for Discharge  11/15/2022 2126 by Jeanine Sweeney RN  Outcome: Adequate for Discharge  Goal: Appropriate maternal -  bonding  Description: INTERVENTIONS:  - Assess caregiver- interactions. - Assess caregiver's emotional status and coping mechanisms. - Encourage caregiver to participate in  daily care. - Assess support systems available to mother/family.  - Provide /case management support as needed.   11/15/2022 2348 by Jeanine Sweeney RN  Outcome: Adequate for Discharge  11/15/2022 2126 by Jeanine Sweeney RN  Outcome: Adequate for Discharge

## 2022-11-18 ENCOUNTER — TELEPHONE (OUTPATIENT)
Dept: OBGYN UNIT | Facility: HOSPITAL | Age: 32
End: 2022-11-18

## 2022-11-18 NOTE — PROGRESS NOTES
Reviewed self and infant care w / mom, she verbalizes understanding of instructions reviewed. Encourage to follow up w/ MDs as directed and w/ questions/concerns.  Has been to ped office, enc to join ct

## 2022-11-23 ENCOUNTER — NURSE ONLY (OUTPATIENT)
Dept: LACTATION | Facility: HOSPITAL | Age: 32
End: 2022-11-23
Attending: OBSTETRICS & GYNECOLOGY
Payer: COMMERCIAL

## 2022-11-23 PROCEDURE — 99212 OFFICE O/P EST SF 10 MIN: CPT

## 2023-01-05 ENCOUNTER — POSTPARTUM (OUTPATIENT)
Dept: OBGYN CLINIC | Facility: CLINIC | Age: 33
End: 2023-01-05
Payer: COMMERCIAL

## 2023-01-05 VITALS
DIASTOLIC BLOOD PRESSURE: 60 MMHG | HEIGHT: 63 IN | BODY MASS INDEX: 34.37 KG/M2 | WEIGHT: 194 LBS | SYSTOLIC BLOOD PRESSURE: 108 MMHG

## 2023-01-05 PROBLEM — U07.1 COVID-19 AFFECTING PREGNANCY IN SECOND TRIMESTER: Status: RESOLVED | Noted: 2022-06-09 | Resolved: 2023-01-05

## 2023-01-05 PROBLEM — O12.13 PROTEINURIA AFFECTING PREGNANCY IN THIRD TRIMESTER: Status: RESOLVED | Noted: 2022-10-28 | Resolved: 2023-01-05

## 2023-01-05 PROBLEM — Z34.00 SUPERVISION OF NORMAL FIRST PREGNANCY, ANTEPARTUM: Status: RESOLVED | Noted: 2022-09-24 | Resolved: 2023-01-05

## 2023-01-05 PROBLEM — O12.13 PROTEINURIA AFFECTING PREGNANCY IN THIRD TRIMESTER (HCC): Status: RESOLVED | Noted: 2022-10-28 | Resolved: 2023-01-05

## 2023-01-05 PROBLEM — O98.512 COVID-19 AFFECTING PREGNANCY IN SECOND TRIMESTER: Status: RESOLVED | Noted: 2022-06-09 | Resolved: 2023-01-05

## 2023-01-05 PROBLEM — Z34.90 PREGNANCY (HCC): Status: RESOLVED | Noted: 2022-11-13 | Resolved: 2023-01-05

## 2023-01-05 PROBLEM — O98.512 COVID-19 AFFECTING PREGNANCY IN SECOND TRIMESTER (HCC): Status: RESOLVED | Noted: 2022-06-09 | Resolved: 2023-01-05

## 2023-01-05 PROBLEM — Z34.90 PRENATAL CARE, ANTEPARTUM: Status: RESOLVED | Noted: 2022-05-12 | Resolved: 2023-01-05

## 2023-01-05 PROBLEM — Z34.90 PREGNANCY: Status: RESOLVED | Noted: 2022-11-13 | Resolved: 2023-01-05

## 2023-01-05 PROBLEM — Z34.90 PRENATAL CARE, ANTEPARTUM (HCC): Status: RESOLVED | Noted: 2022-05-12 | Resolved: 2023-01-05

## 2023-01-05 PROBLEM — Z34.00 SUPERVISION OF NORMAL FIRST PREGNANCY, ANTEPARTUM (HCC): Status: RESOLVED | Noted: 2022-09-24 | Resolved: 2023-01-05

## 2023-01-05 PROBLEM — U07.1 COVID-19 AFFECTING PREGNANCY IN SECOND TRIMESTER (HCC): Status: RESOLVED | Noted: 2022-06-09 | Resolved: 2023-01-05

## 2023-01-05 PROCEDURE — 3078F DIAST BP <80 MM HG: CPT | Performed by: OBSTETRICS & GYNECOLOGY

## 2023-01-05 PROCEDURE — 3074F SYST BP LT 130 MM HG: CPT | Performed by: OBSTETRICS & GYNECOLOGY

## 2023-01-05 PROCEDURE — 3008F BODY MASS INDEX DOCD: CPT | Performed by: OBSTETRICS & GYNECOLOGY

## 2023-03-21 ENCOUNTER — INITIAL PRENATAL (OUTPATIENT)
Facility: CLINIC | Age: 33
End: 2023-03-21
Payer: COMMERCIAL

## 2023-03-21 ENCOUNTER — ULTRASOUND ENCOUNTER (OUTPATIENT)
Facility: CLINIC | Age: 33
End: 2023-03-21
Payer: COMMERCIAL

## 2023-03-21 VITALS
WEIGHT: 195.81 LBS | HEIGHT: 63 IN | BODY MASS INDEX: 34.7 KG/M2 | SYSTOLIC BLOOD PRESSURE: 112 MMHG | HEART RATE: 77 BPM | DIASTOLIC BLOOD PRESSURE: 78 MMHG

## 2023-03-21 DIAGNOSIS — O99.280 HYPOTHYROID IN PREGNANCY, ANTEPARTUM: ICD-10-CM

## 2023-03-21 DIAGNOSIS — O99.210 OBESITY AFFECTING PREGNANCY, ANTEPARTUM: ICD-10-CM

## 2023-03-21 DIAGNOSIS — E03.9 HYPOTHYROID IN PREGNANCY, ANTEPARTUM: ICD-10-CM

## 2023-03-21 DIAGNOSIS — Z34.81 ENCOUNTER FOR SUPERVISION OF OTHER NORMAL PREGNANCY IN FIRST TRIMESTER: Primary | ICD-10-CM

## 2023-03-21 DIAGNOSIS — O36.80X0 PREGNANCY WITH INCONCLUSIVE FETAL VIABILITY, SINGLE OR UNSPECIFIED FETUS: ICD-10-CM

## 2023-03-21 DIAGNOSIS — O36.80X0 PREGNANCY WITH INCONCLUSIVE FETAL VIABILITY, SINGLE OR UNSPECIFIED FETUS: Primary | ICD-10-CM

## 2023-03-21 PROCEDURE — 87491 CHLMYD TRACH DNA AMP PROBE: CPT | Performed by: STUDENT IN AN ORGANIZED HEALTH CARE EDUCATION/TRAINING PROGRAM

## 2023-03-21 PROCEDURE — 87591 N.GONORRHOEAE DNA AMP PROB: CPT | Performed by: STUDENT IN AN ORGANIZED HEALTH CARE EDUCATION/TRAINING PROGRAM

## 2023-03-21 PROCEDURE — 3078F DIAST BP <80 MM HG: CPT | Performed by: STUDENT IN AN ORGANIZED HEALTH CARE EDUCATION/TRAINING PROGRAM

## 2023-03-21 PROCEDURE — 76801 OB US < 14 WKS SINGLE FETUS: CPT | Performed by: STUDENT IN AN ORGANIZED HEALTH CARE EDUCATION/TRAINING PROGRAM

## 2023-03-21 PROCEDURE — 3074F SYST BP LT 130 MM HG: CPT | Performed by: STUDENT IN AN ORGANIZED HEALTH CARE EDUCATION/TRAINING PROGRAM

## 2023-03-21 PROCEDURE — 3008F BODY MASS INDEX DOCD: CPT | Performed by: STUDENT IN AN ORGANIZED HEALTH CARE EDUCATION/TRAINING PROGRAM

## 2023-03-21 PROCEDURE — 87086 URINE CULTURE/COLONY COUNT: CPT | Performed by: STUDENT IN AN ORGANIZED HEALTH CARE EDUCATION/TRAINING PROGRAM

## 2023-03-21 NOTE — PATIENT INSTRUCTIONS
Congratulations! Your Due Date is: Estimated Date of Delivery: 23     Prenatal labs  -sooner is better   -these labs unfortunately CANNOT be done in our office at this time  -please go to your preferred lab [facility])    Prenatal vitamin   -make sure it CONTAINS IRON. The gummy vitamins frequently DO NOT CONTAIN IRON. For nausea and vomiting:  -take vitamin B6 (25mg) +/- unisom (aka doxylamine) 12.5mg (this is half of a 25mg tablet) every night to PREVENT morning sickness. Buy these over the counter. Can also take these in the AM and in the afternoon, too, if needed.  -call if this is not effective, we can try a prescription nausea medication      Low dose aspirin  -recommended if 1 or more risk factors for preeclampsia or gestational hypertension  -seems to help reduce risk of preeclampsia  -recommended time to start is 11-12 weeks  -current [facility] recommendation is 1.5 tablets of the aspirin 81 mg tab. If you cannot split the tablet you can take 2. Genetic screening  2 types to consider - both OPTIONAL:   1) Screening of the fetus for chromosomal disorders (e.g., Down Syndrome):   -Multiple ways to screen for this  -None of these tests are 100% accurate. If an abnormal result is obtained, further testing is advised. -Most popular are:       () cell free DNA (also called \"NIPS\" or \"non-invasive prenatal screening\")   -blood draw only   -looks for fragments of placental DNA in maternal bloodstream   -placental DNA does NOT always match fetal DNA  -timing: must be at least 10w0d to be drawn   -where: done in our office (or with high risk OB/MFM)   -cost: genetics company checks insurance benefits & calls you with cost estimate prior to running the test(s)          () nuchal translucency (NT) ultrasound   -measures thickness of fetal neck skin fold (looking for abnormal swelling)   -timinw0d - 13w6d   -where: high risk OB/MFM office. Cannot be done at our office.      2) Screening of the mother -CAN BE DONE ANYTIME  -done to see if she is a carrier of a mutation that causes a disease, that she could pass along to the fetus. If she is positive for a mutation, generally it is recommended to screen the father of the fetus to see if he is a carrier for the same mutation to determine risk of the fetus having the disease caused by the mutation.   -most common diseases like this (\"recessive\" genetic diseases) are Cystic Fibrosis, Spinal Muscular Atrophy, Sickle Cell Disease or Thalassemia. -NOTE: all babies are tested at birth for ~30 of the most common of these diseases, so do not necessarily need to test the mother during pregnancy. Testing now is an optional & personal choice      AFP level   There is an optional blood test that we can perform called \"AFP level. \" It is a chemical in the bloodstream produced by the pregnancy. It is done between 15-20 weeks gestation. The ideal time for testing is actually 16-18 weeks gestation. If the level is abnormally elevated, this can indicate the presence of abnormalities of the development of the brain and spinal cord such as anencephaly (lack of brain development) and spina bifida (exposed spinal cord). These anomalies can generally be seen on ultrasound. It can also be elevated in cases of normal fetal anatomy and can indicate an abnormal placenta. This may or may not be able to be detected on ultrasound. Please think about whether or not you would like to have this test done. When you decide if you would like to have this test done, please let us know. We must enter your exact gestational age and weight on the date of the blood draw for the test to be calculated accurately.       Fetal anatomy scan (\"20 week ultrasound\")  -can be done in our office (schedule with ) if no particular high risk feature or indication  -recommend having done with MFM (Maternal Fetal Medicine aka \"High Risk OB\") if higher risk e.g family history of birth defects, chronic hypertension, diabetes, advanced maternal age, BMI over 27, etc.

## 2023-03-21 NOTE — PROGRESS NOTES
Initial OB - 7w3d     Pt doing well, some nausea but mild. Some heartburn and fatigue  Short interval pregnancy, delivered first child , was uncomplicated  at term  Reports during that pregnancy saw MFM for obesity, plus they had been monitoring Bps and had \"borderline\" elevated Bps towards 37wk, had proteinuria but told she did not meet full criteria for preeclampsia, pt delivered at 39wk    PMHx - hypothyroid, possible prediabetes? . Denies HIV, hepatitis, HSV, VTE  Last pap 2022 NILM HPV neg    33year old , EDC by 7wk US not c/w LMP - irregular menses after  2022 (only had one period after delivery)  -declines genetic screening    1) Obesity  -pre-pregnancy BMI 34  -ordered early 1h GTT, A1c as pt reports possible hx pre-diabetes  -recommended low dose aspirin given risk factor of obesity plus pt reports borderline Bps end of last pregnancy (told did not meet full criteria for preeclampsia)  -plan for L2US, growth US, NSTs    2) Hypothyroidism  -on synthroid, TSH ordered with PNL

## 2023-03-22 LAB
C TRACH DNA SPEC QL NAA+PROBE: NEGATIVE
N GONORRHOEA DNA SPEC QL NAA+PROBE: NEGATIVE

## 2023-04-04 ENCOUNTER — LAB ENCOUNTER (OUTPATIENT)
Dept: LAB | Age: 33
End: 2023-04-04
Attending: STUDENT IN AN ORGANIZED HEALTH CARE EDUCATION/TRAINING PROGRAM
Payer: COMMERCIAL

## 2023-04-04 DIAGNOSIS — Z34.81 ENCOUNTER FOR SUPERVISION OF OTHER NORMAL PREGNANCY IN FIRST TRIMESTER: ICD-10-CM

## 2023-04-04 DIAGNOSIS — O99.210 OBESITY AFFECTING PREGNANCY, ANTEPARTUM: ICD-10-CM

## 2023-04-04 DIAGNOSIS — E03.9 HYPOTHYROID IN PREGNANCY, ANTEPARTUM: ICD-10-CM

## 2023-04-04 DIAGNOSIS — O99.280 HYPOTHYROID IN PREGNANCY, ANTEPARTUM: ICD-10-CM

## 2023-04-04 LAB
ANTIBODY SCREEN: NEGATIVE
BASOPHILS # BLD AUTO: 0.03 X10(3) UL (ref 0–0.2)
BASOPHILS NFR BLD AUTO: 0.3 %
EOSINOPHIL # BLD AUTO: 0.05 X10(3) UL (ref 0–0.7)
EOSINOPHIL NFR BLD AUTO: 0.6 %
ERYTHROCYTE [DISTWIDTH] IN BLOOD BY AUTOMATED COUNT: 13.2 %
EST. AVERAGE GLUCOSE BLD GHB EST-MCNC: 91 MG/DL (ref 68–126)
HBA1C MFR BLD: 4.8 % (ref ?–5.7)
HBV SURFACE AG SER-ACNC: <0.1 [IU]/L
HBV SURFACE AG SERPL QL IA: NONREACTIVE
HCT VFR BLD AUTO: 40.3 %
HCV AB SERPL QL IA: NONREACTIVE
HGB BLD-MCNC: 13.8 G/DL
IMM GRANULOCYTES # BLD AUTO: 0.03 X10(3) UL (ref 0–1)
IMM GRANULOCYTES NFR BLD: 0.3 %
LYMPHOCYTES # BLD AUTO: 2.01 X10(3) UL (ref 1–4)
LYMPHOCYTES NFR BLD AUTO: 22.8 %
MCH RBC QN AUTO: 30.3 PG (ref 26–34)
MCHC RBC AUTO-ENTMCNC: 34.2 G/DL (ref 31–37)
MCV RBC AUTO: 88.6 FL
MONOCYTES # BLD AUTO: 0.58 X10(3) UL (ref 0.1–1)
MONOCYTES NFR BLD AUTO: 6.6 %
NEUTROPHILS # BLD AUTO: 6.13 X10 (3) UL (ref 1.5–7.7)
NEUTROPHILS # BLD AUTO: 6.13 X10(3) UL (ref 1.5–7.7)
NEUTROPHILS NFR BLD AUTO: 69.4 %
PLATELET # BLD AUTO: 300 10(3)UL (ref 150–450)
RBC # BLD AUTO: 4.55 X10(6)UL
RH BLOOD TYPE: POSITIVE
RUBV IGG SER QL: POSITIVE
RUBV IGG SER-ACNC: 140.7 IU/ML (ref 10–?)
T PALLIDUM AB SER QL IA: NONREACTIVE
TSI SER-ACNC: 0.62 MIU/ML (ref 0.36–3.74)
WBC # BLD AUTO: 8.8 X10(3) UL (ref 4–11)

## 2023-04-04 PROCEDURE — 86780 TREPONEMA PALLIDUM: CPT

## 2023-04-04 PROCEDURE — 85025 COMPLETE CBC W/AUTO DIFF WBC: CPT

## 2023-04-04 PROCEDURE — 83036 HEMOGLOBIN GLYCOSYLATED A1C: CPT

## 2023-04-04 PROCEDURE — 36415 COLL VENOUS BLD VENIPUNCTURE: CPT

## 2023-04-04 PROCEDURE — 87340 HEPATITIS B SURFACE AG IA: CPT

## 2023-04-04 PROCEDURE — 86762 RUBELLA ANTIBODY: CPT

## 2023-04-04 PROCEDURE — 87389 HIV-1 AG W/HIV-1&-2 AB AG IA: CPT

## 2023-04-04 PROCEDURE — 84443 ASSAY THYROID STIM HORMONE: CPT

## 2023-04-04 PROCEDURE — 86901 BLOOD TYPING SEROLOGIC RH(D): CPT

## 2023-04-04 PROCEDURE — 86803 HEPATITIS C AB TEST: CPT

## 2023-04-04 PROCEDURE — 86850 RBC ANTIBODY SCREEN: CPT

## 2023-04-04 PROCEDURE — 86900 BLOOD TYPING SEROLOGIC ABO: CPT

## 2023-04-20 ENCOUNTER — ROUTINE PRENATAL (OUTPATIENT)
Facility: CLINIC | Age: 33
End: 2023-04-20
Payer: COMMERCIAL

## 2023-04-20 VITALS
BODY MASS INDEX: 34.73 KG/M2 | HEIGHT: 63 IN | HEART RATE: 80 BPM | WEIGHT: 196 LBS | SYSTOLIC BLOOD PRESSURE: 120 MMHG | DIASTOLIC BLOOD PRESSURE: 80 MMHG

## 2023-04-20 DIAGNOSIS — O99.210 OBESITY AFFECTING PREGNANCY, ANTEPARTUM: ICD-10-CM

## 2023-04-20 DIAGNOSIS — Z3A.11 11 WEEKS GESTATION OF PREGNANCY: ICD-10-CM

## 2023-04-20 DIAGNOSIS — Z34.81 PRENATAL CARE, SUBSEQUENT PREGNANCY, FIRST TRIMESTER: Primary | ICD-10-CM

## 2023-04-20 PROBLEM — O99.810 ABNORMAL GLUCOSE TOLERANCE TEST IN PREGNANCY: Status: RESOLVED | Noted: 2022-08-04 | Resolved: 2023-04-20

## 2023-04-20 PROBLEM — R93.89 THYROID WITH HETEROGENEOUS ECHOTEXTURE DETERMINED BY ULTRASOUND: Status: RESOLVED | Noted: 2020-10-21 | Resolved: 2023-04-20

## 2023-04-20 PROBLEM — O99.810 ABNORMAL GLUCOSE TOLERANCE TEST IN PREGNANCY (HCC): Status: RESOLVED | Noted: 2022-08-04 | Resolved: 2023-04-20

## 2023-04-20 PROCEDURE — 81002 URINALYSIS NONAUTO W/O SCOPE: CPT | Performed by: OBSTETRICS & GYNECOLOGY

## 2023-04-20 PROCEDURE — 3074F SYST BP LT 130 MM HG: CPT | Performed by: OBSTETRICS & GYNECOLOGY

## 2023-04-20 PROCEDURE — 3079F DIAST BP 80-89 MM HG: CPT | Performed by: OBSTETRICS & GYNECOLOGY

## 2023-04-20 PROCEDURE — 3008F BODY MASS INDEX DOCD: CPT | Performed by: OBSTETRICS & GYNECOLOGY

## 2023-04-20 NOTE — PROGRESS NOTES
Patient has no complaints    Last pap 2022 NILM HPV neg     EDC by 7wk US not c/w LMP - irregular menses after  2022 (only had one period after delivery)  -declines genetic screening    1) Obesity  -pre-pregnancy BMI 34  -ordered early 1h GTT - eminded, A1c - 4.8 on 23  -recommended low dose aspirin given risk factor of obesity plus pt reports borderline Bps end of last pregnancy (told did not meet full criteria for preeclampsia)  -plan for L2US, growth US, NSTs    2) Hypothyroidism  -on synthroid  - TSH 0.623 on 23

## 2023-04-28 ENCOUNTER — LAB ENCOUNTER (OUTPATIENT)
Dept: LAB | Age: 33
End: 2023-04-28
Attending: STUDENT IN AN ORGANIZED HEALTH CARE EDUCATION/TRAINING PROGRAM
Payer: COMMERCIAL

## 2023-04-28 DIAGNOSIS — O99.210 OBESITY AFFECTING PREGNANCY, ANTEPARTUM: ICD-10-CM

## 2023-04-28 LAB — GLUCOSE 1H P GLC SERPL-MCNC: 132 MG/DL

## 2023-04-28 PROCEDURE — 36415 COLL VENOUS BLD VENIPUNCTURE: CPT

## 2023-04-28 PROCEDURE — 82950 GLUCOSE TEST: CPT

## 2023-05-18 ENCOUNTER — ROUTINE PRENATAL (OUTPATIENT)
Facility: CLINIC | Age: 33
End: 2023-05-18
Payer: COMMERCIAL

## 2023-05-18 VITALS
DIASTOLIC BLOOD PRESSURE: 78 MMHG | WEIGHT: 197.19 LBS | HEIGHT: 63 IN | HEART RATE: 75 BPM | SYSTOLIC BLOOD PRESSURE: 118 MMHG | BODY MASS INDEX: 34.94 KG/M2

## 2023-05-18 DIAGNOSIS — E03.9 HYPOTHYROID IN PREGNANCY, ANTEPARTUM: ICD-10-CM

## 2023-05-18 DIAGNOSIS — O99.280 HYPOTHYROID IN PREGNANCY, ANTEPARTUM: ICD-10-CM

## 2023-05-18 DIAGNOSIS — Z34.91 ENCOUNTER FOR SUPERVISION OF NORMAL PREGNANCY IN FIRST TRIMESTER, UNSPECIFIED GRAVIDITY: Primary | ICD-10-CM

## 2023-05-18 DIAGNOSIS — O99.210 OBESITY AFFECTING PREGNANCY, ANTEPARTUM: ICD-10-CM

## 2023-05-18 PROCEDURE — 81002 URINALYSIS NONAUTO W/O SCOPE: CPT

## 2023-05-18 PROCEDURE — 3008F BODY MASS INDEX DOCD: CPT

## 2023-05-18 PROCEDURE — 3078F DIAST BP <80 MM HG: CPT

## 2023-05-18 PROCEDURE — 3074F SYST BP LT 130 MM HG: CPT

## 2023-06-16 PROBLEM — O99.212 OBESITY AFFECTING PREGNANCY IN SECOND TRIMESTER: Status: ACTIVE | Noted: 2023-06-16

## 2023-06-16 PROBLEM — O99.282 HYPOTHYROIDISM AFFECTING PREGNANCY IN SECOND TRIMESTER (HCC): Status: ACTIVE | Noted: 2023-06-16

## 2023-06-16 PROBLEM — O09.892 SHORT INTERVAL BETWEEN PREGNANCIES AFFECTING PREGNANCY IN SECOND TRIMESTER, ANTEPARTUM (HCC): Status: ACTIVE | Noted: 2023-06-16

## 2023-06-16 PROBLEM — Z34.82 PRENATAL CARE, SUBSEQUENT PREGNANCY IN SECOND TRIMESTER (HCC): Status: ACTIVE | Noted: 2023-06-16

## 2023-06-16 PROBLEM — Z34.82 PRENATAL CARE, SUBSEQUENT PREGNANCY IN SECOND TRIMESTER: Status: ACTIVE | Noted: 2023-06-16

## 2023-06-16 PROBLEM — E03.9 HYPOTHYROIDISM AFFECTING PREGNANCY IN SECOND TRIMESTER (HCC): Status: ACTIVE | Noted: 2023-06-16

## 2023-06-16 PROBLEM — O99.212 OBESITY AFFECTING PREGNANCY IN SECOND TRIMESTER (HCC): Status: ACTIVE | Noted: 2023-06-16

## 2023-06-16 PROBLEM — O09.892 SHORT INTERVAL BETWEEN PREGNANCIES AFFECTING PREGNANCY IN SECOND TRIMESTER, ANTEPARTUM: Status: ACTIVE | Noted: 2023-06-16

## 2023-06-16 PROBLEM — E03.9 HYPOTHYROIDISM AFFECTING PREGNANCY IN SECOND TRIMESTER: Status: ACTIVE | Noted: 2023-06-16

## 2023-06-16 PROBLEM — O99.282 HYPOTHYROIDISM AFFECTING PREGNANCY IN SECOND TRIMESTER: Status: ACTIVE | Noted: 2023-06-16

## 2023-06-22 ENCOUNTER — ULTRASOUND ENCOUNTER (OUTPATIENT)
Dept: PERINATAL CARE | Facility: HOSPITAL | Age: 33
End: 2023-06-22
Attending: OBSTETRICS & GYNECOLOGY
Payer: COMMERCIAL

## 2023-06-22 ENCOUNTER — OFFICE VISIT (OUTPATIENT)
Dept: PERINATAL CARE | Facility: HOSPITAL | Age: 33
End: 2023-06-22
Payer: COMMERCIAL

## 2023-06-22 VITALS
HEART RATE: 111 BPM | DIASTOLIC BLOOD PRESSURE: 79 MMHG | HEIGHT: 63 IN | WEIGHT: 198 LBS | BODY MASS INDEX: 35.08 KG/M2 | SYSTOLIC BLOOD PRESSURE: 122 MMHG

## 2023-06-22 DIAGNOSIS — E06.3 HASHIMOTO'S THYROIDITIS: ICD-10-CM

## 2023-06-22 DIAGNOSIS — O99.212 OBESITY AFFECTING PREGNANCY IN SECOND TRIMESTER: ICD-10-CM

## 2023-06-22 DIAGNOSIS — O99.282 HYPOTHYROIDISM AFFECTING PREGNANCY IN SECOND TRIMESTER: ICD-10-CM

## 2023-06-22 DIAGNOSIS — E06.3 HYPOTHYROIDISM, ACQUIRED, AUTOIMMUNE: ICD-10-CM

## 2023-06-22 DIAGNOSIS — E03.9 HYPOTHYROIDISM AFFECTING PREGNANCY IN SECOND TRIMESTER: ICD-10-CM

## 2023-06-22 DIAGNOSIS — O99.212 OBESITY AFFECTING PREGNANCY IN SECOND TRIMESTER: Primary | ICD-10-CM

## 2023-06-22 PROCEDURE — 76811 OB US DETAILED SNGL FETUS: CPT | Performed by: OBSTETRICS & GYNECOLOGY

## 2023-06-22 NOTE — PROGRESS NOTES
Pt here for Level II Ultrasound  +fm noted per patient  Pt c/o sciatic pain (R) side, pt denies further complaints.

## 2023-06-23 ENCOUNTER — TELEPHONE (OUTPATIENT)
Facility: CLINIC | Age: 33
End: 2023-06-23

## 2023-06-23 ENCOUNTER — ROUTINE PRENATAL (OUTPATIENT)
Dept: OBGYN CLINIC | Facility: CLINIC | Age: 33
End: 2023-06-23
Payer: COMMERCIAL

## 2023-06-23 VITALS
HEART RATE: 103 BPM | SYSTOLIC BLOOD PRESSURE: 130 MMHG | DIASTOLIC BLOOD PRESSURE: 78 MMHG | WEIGHT: 197.06 LBS | BODY MASS INDEX: 34.91 KG/M2 | HEIGHT: 63 IN

## 2023-06-23 DIAGNOSIS — Z34.92 PRENATAL CARE, SECOND TRIMESTER: Primary | ICD-10-CM

## 2023-06-23 DIAGNOSIS — O99.210 OBESITY AFFECTING PREGNANCY, ANTEPARTUM: ICD-10-CM

## 2023-06-23 LAB
CREAT UR-SCNC: 110 MG/DL
GLUCOSE (URINE DIPSTICK): NEGATIVE MG/DL
MULTISTIX LOT#: NORMAL NUMERIC
PROT UR-MCNC: 24.7 MG/DL
PROT/CREAT UR-RTO: 0.22

## 2023-06-23 PROCEDURE — 3008F BODY MASS INDEX DOCD: CPT

## 2023-06-23 PROCEDURE — 3078F DIAST BP <80 MM HG: CPT

## 2023-06-23 PROCEDURE — 82570 ASSAY OF URINE CREATININE: CPT

## 2023-06-23 PROCEDURE — 81002 URINALYSIS NONAUTO W/O SCOPE: CPT

## 2023-06-23 PROCEDURE — 84156 ASSAY OF PROTEIN URINE: CPT

## 2023-06-23 PROCEDURE — 3075F SYST BP GE 130 - 139MM HG: CPT

## 2023-07-21 ENCOUNTER — ROUTINE PRENATAL (OUTPATIENT)
Facility: CLINIC | Age: 33
End: 2023-07-21
Payer: COMMERCIAL

## 2023-07-21 VITALS
HEART RATE: 95 BPM | WEIGHT: 199.38 LBS | SYSTOLIC BLOOD PRESSURE: 122 MMHG | BODY MASS INDEX: 35.33 KG/M2 | DIASTOLIC BLOOD PRESSURE: 78 MMHG | HEIGHT: 63 IN

## 2023-07-21 DIAGNOSIS — M54.50 LOW BACK PAIN DURING PREGNANCY IN SECOND TRIMESTER: ICD-10-CM

## 2023-07-21 DIAGNOSIS — O99.282 HYPOTHYROIDISM AFFECTING PREGNANCY IN SECOND TRIMESTER: ICD-10-CM

## 2023-07-21 DIAGNOSIS — O09.892 SHORT INTERVAL BETWEEN PREGNANCIES AFFECTING PREGNANCY IN SECOND TRIMESTER, ANTEPARTUM: ICD-10-CM

## 2023-07-21 DIAGNOSIS — Z34.82 PRENATAL CARE, SUBSEQUENT PREGNANCY IN SECOND TRIMESTER: ICD-10-CM

## 2023-07-21 DIAGNOSIS — M54.32 BILATERAL SCIATICA: ICD-10-CM

## 2023-07-21 DIAGNOSIS — O99.212 OTHER OBESITY DUE TO EXCESS CALORIES AFFECTING PREGNANCY IN SECOND TRIMESTER: Primary | ICD-10-CM

## 2023-07-21 DIAGNOSIS — E66.09 OTHER OBESITY DUE TO EXCESS CALORIES AFFECTING PREGNANCY IN SECOND TRIMESTER: Primary | ICD-10-CM

## 2023-07-21 DIAGNOSIS — O26.892 LOW BACK PAIN DURING PREGNANCY IN SECOND TRIMESTER: ICD-10-CM

## 2023-07-21 DIAGNOSIS — M54.31 BILATERAL SCIATICA: ICD-10-CM

## 2023-07-21 DIAGNOSIS — E03.9 HYPOTHYROIDISM AFFECTING PREGNANCY IN SECOND TRIMESTER: ICD-10-CM

## 2023-07-21 DIAGNOSIS — Z13.1 SCREENING FOR DIABETES MELLITUS: ICD-10-CM

## 2023-07-21 DIAGNOSIS — Z13.0 SCREENING, ANEMIA, DEFICIENCY, IRON: ICD-10-CM

## 2023-07-21 LAB
GLUCOSE (URINE DIPSTICK): NEGATIVE MG/DL
MULTISTIX LOT#: NORMAL NUMERIC

## 2023-07-21 PROCEDURE — 81002 URINALYSIS NONAUTO W/O SCOPE: CPT | Performed by: OBSTETRICS & GYNECOLOGY

## 2023-07-21 PROCEDURE — 3008F BODY MASS INDEX DOCD: CPT | Performed by: OBSTETRICS & GYNECOLOGY

## 2023-07-21 PROCEDURE — 3078F DIAST BP <80 MM HG: CPT | Performed by: OBSTETRICS & GYNECOLOGY

## 2023-07-21 PROCEDURE — 3074F SYST BP LT 130 MM HG: CPT | Performed by: OBSTETRICS & GYNECOLOGY

## 2023-07-21 NOTE — PATIENT INSTRUCTIONS
Have bloodwork done now. Tdap (Tetanus, Diptheria, Pertussis)   -booster shot for pertussis (whooping cough)  -recommended by the CDC (Centers for Disease Control) for every pregnant woman in the third trimester (28 weeks and beyond)  -the purpose of giving the vaccine during pregnancy is so that the mother can share her antibodies with the fetus across the placenta  -it is recommended to take this vaccine early in the third trimester so that your body has enough time to produce the antibodies that can pass across the placenta to the fetus  -the infant cannot be vaccinated for pertussis until 3months of age due to an immature immune system and lack of response to the vaccine prior to that time.   -the father of the baby as well as grandparents, other caregivers, etc should receive a booster shot for whooping cough as well (vaccination at least 1 time after the age of 25 is sufficient)       Aspirin in pregnancy  -81 mg tablet - take 1.5 or 2 tablets per day.  Start at 12-13 weeks   -may help prevent  pre-eclampsia by helping with placental development and function  -may discontinue at term (37 wk)

## 2023-07-21 NOTE — PROGRESS NOTES
NGUYỄN    19 min late for visit today    +FM. No bleeding or vaginal bleeding. Back & sciatica pain. Stretching doesn't help.      29year old  at 18w6d   PELON 23 7wk US not c/w LMP - irregular menses after  2022 (only had one period after delivery)  O+    -declines genetic screening  -Tdap info   -CBC, 1 hr GTT, TSH, A1c now      1) Obesity (pre-preg BMI 34.6)  -early 1h GTT borderline 132 mg/dL, A1c - 4.8 on   - UPC 0.22   -L2US - marginal placental cord insertion, normal fetal anatomy  -aspirin - not yet taking   -growth US at 32wk - appt   -NSTs 36 weeks     2) Marginal placenta cord insertion   -Growth US & BPP 32 wk  -NSTs 36wk     3) Hypothyroidism  -on synthroid 75 mcg   -follows endocrinologist   -TSH every trimester   - TSH 0.623 on 23- 1st trimester   -TSH for 2nd trim ordered     Low back & sciatica pain  at 20 wk  -PT referral ordered     Short interval pregnancy  -increased risk IUGR/SGA    RTC 4 wk

## 2023-08-11 ENCOUNTER — LAB ENCOUNTER (OUTPATIENT)
Dept: LAB | Age: 33
End: 2023-08-11
Attending: OBSTETRICS & GYNECOLOGY
Payer: COMMERCIAL

## 2023-08-11 DIAGNOSIS — Z13.0 SCREENING, ANEMIA, DEFICIENCY, IRON: ICD-10-CM

## 2023-08-11 DIAGNOSIS — E03.9 HYPOTHYROIDISM AFFECTING PREGNANCY IN SECOND TRIMESTER: ICD-10-CM

## 2023-08-11 DIAGNOSIS — O99.282 HYPOTHYROIDISM AFFECTING PREGNANCY IN SECOND TRIMESTER: ICD-10-CM

## 2023-08-11 DIAGNOSIS — Z13.1 SCREENING FOR DIABETES MELLITUS: ICD-10-CM

## 2023-08-11 PROBLEM — O99.810 ABNORMAL GLUCOSE TOLERANCE TEST (GTT) DURING PREGNANCY, ANTEPARTUM: Status: ACTIVE | Noted: 2023-08-11

## 2023-08-11 PROBLEM — O99.810 ABNORMAL GLUCOSE TOLERANCE TEST (GTT) DURING PREGNANCY, ANTEPARTUM (HCC): Status: ACTIVE | Noted: 2023-08-11

## 2023-08-11 LAB
BASOPHILS # BLD AUTO: 0.01 X10(3) UL (ref 0–0.2)
BASOPHILS NFR BLD AUTO: 0.1 %
EOSINOPHIL # BLD AUTO: 0.03 X10(3) UL (ref 0–0.7)
EOSINOPHIL NFR BLD AUTO: 0.3 %
ERYTHROCYTE [DISTWIDTH] IN BLOOD BY AUTOMATED COUNT: 14.4 %
EST. AVERAGE GLUCOSE BLD GHB EST-MCNC: 103 MG/DL (ref 68–126)
GLUCOSE 1H P GLC SERPL-MCNC: 154 MG/DL
HBA1C MFR BLD: 5.2 % (ref ?–5.7)
HCT VFR BLD AUTO: 37.9 %
HGB BLD-MCNC: 12.1 G/DL
IMM GRANULOCYTES # BLD AUTO: 0.05 X10(3) UL (ref 0–1)
IMM GRANULOCYTES NFR BLD: 0.5 %
LYMPHOCYTES # BLD AUTO: 1.68 X10(3) UL (ref 1–4)
LYMPHOCYTES NFR BLD AUTO: 18.2 %
MCH RBC QN AUTO: 29.2 PG (ref 26–34)
MCHC RBC AUTO-ENTMCNC: 31.9 G/DL (ref 31–37)
MCV RBC AUTO: 91.3 FL
MONOCYTES # BLD AUTO: 0.37 X10(3) UL (ref 0.1–1)
MONOCYTES NFR BLD AUTO: 4 %
NEUTROPHILS # BLD AUTO: 7.11 X10 (3) UL (ref 1.5–7.7)
NEUTROPHILS # BLD AUTO: 7.11 X10(3) UL (ref 1.5–7.7)
NEUTROPHILS NFR BLD AUTO: 76.9 %
PLATELET # BLD AUTO: 263 10(3)UL (ref 150–450)
RBC # BLD AUTO: 4.15 X10(6)UL
TSI SER-ACNC: 0.56 MIU/ML (ref 0.36–3.74)
WBC # BLD AUTO: 9.3 X10(3) UL (ref 4–11)

## 2023-08-11 PROCEDURE — 85025 COMPLETE CBC W/AUTO DIFF WBC: CPT

## 2023-08-11 PROCEDURE — 82950 GLUCOSE TEST: CPT

## 2023-08-11 PROCEDURE — 83036 HEMOGLOBIN GLYCOSYLATED A1C: CPT

## 2023-08-11 PROCEDURE — 36415 COLL VENOUS BLD VENIPUNCTURE: CPT

## 2023-08-11 PROCEDURE — 84443 ASSAY THYROID STIM HORMONE: CPT

## 2023-08-17 ENCOUNTER — LABORATORY ENCOUNTER (OUTPATIENT)
Dept: LAB | Age: 33
End: 2023-08-17
Attending: OBSTETRICS & GYNECOLOGY
Payer: COMMERCIAL

## 2023-08-17 ENCOUNTER — ROUTINE PRENATAL (OUTPATIENT)
Facility: CLINIC | Age: 33
End: 2023-08-17
Payer: COMMERCIAL

## 2023-08-17 VITALS
SYSTOLIC BLOOD PRESSURE: 126 MMHG | DIASTOLIC BLOOD PRESSURE: 74 MMHG | WEIGHT: 203.81 LBS | HEART RATE: 94 BPM | BODY MASS INDEX: 36 KG/M2

## 2023-08-17 DIAGNOSIS — O99.810 ABNORMAL GLUCOSE TOLERANCE TEST (GTT) DURING PREGNANCY, ANTEPARTUM: ICD-10-CM

## 2023-08-17 DIAGNOSIS — Z11.4 SCREENING FOR HIV (HUMAN IMMUNODEFICIENCY VIRUS): ICD-10-CM

## 2023-08-17 DIAGNOSIS — Z23 NEED FOR TDAP VACCINATION: Primary | ICD-10-CM

## 2023-08-17 LAB
GLUCOSE 1H P GLC SERPL-MCNC: 180 MG/DL
GLUCOSE 2H P GLC SERPL-MCNC: 92 MG/DL
GLUCOSE 3H P GLC SERPL-MCNC: 120 MG/DL (ref 70–140)
GLUCOSE P FAST SERPL-MCNC: 88 MG/DL

## 2023-08-17 PROCEDURE — 82951 GLUCOSE TOLERANCE TEST (GTT): CPT

## 2023-08-17 PROCEDURE — 87389 HIV-1 AG W/HIV-1&-2 AB AG IA: CPT

## 2023-08-17 PROCEDURE — 82952 GTT-ADDED SAMPLES: CPT

## 2023-08-17 PROCEDURE — 3078F DIAST BP <80 MM HG: CPT

## 2023-08-17 PROCEDURE — 3074F SYST BP LT 130 MM HG: CPT

## 2023-08-17 PROCEDURE — 90715 TDAP VACCINE 7 YRS/> IM: CPT

## 2023-08-17 PROCEDURE — 90471 IMMUNIZATION ADMIN: CPT

## 2023-08-17 NOTE — PROGRESS NOTES
NGUYỄN 28w5d    She is doing well, no complaints       PELON 23 7wk US not c/w LMP - irregular menses after  2022 (only had one period after delivery)     -declines genetic screening  -Tdap given today   -CBC - normal  -1 hr GTT -elevated, 3 hr GTT done today - in process  -3rd tri HIV - in process      1) Obesity (pre-preg BMI 34.6)  -early 1h GTT borderline 132 mg/dL, A1c - 4.8 on   - UPC 0.22   -L2US - marginal placental cord insertion, normal fetal anatomy  -aspirin - not yet taking   -growth US at 32wk - appt   -NSTs 36 weeks      2) Marginal placenta cord insertion   -Growth US & BPP 32 wk  -NSTs 36wk      3) Hypothyroidism  -on synthroid 75 mcg   -follows endocrinologist   -TSH every trimester                - TSH 0.623 on 23- 1st trimester                -TSH 0.557 on  -2nd tri      Low back & sciatica pain  at 20 wk  -PT referral ordered      Short interval pregnancy

## 2023-08-28 ENCOUNTER — HOSPITAL ENCOUNTER (OUTPATIENT)
Facility: HOSPITAL | Age: 33
Discharge: HOME | End: 2023-08-29
Attending: OBSTETRICS & GYNECOLOGY | Admitting: OBSTETRICS & GYNECOLOGY
Payer: COMMERCIAL

## 2023-08-28 RX ORDER — ACETAMINOPHEN 500 MG
1000 TABLET ORAL EVERY 6 HOURS PRN
Status: DISCONTINUED | OUTPATIENT
Start: 2023-08-28 | End: 2023-08-29

## 2023-08-29 VITALS
WEIGHT: 203.81 LBS | SYSTOLIC BLOOD PRESSURE: 117 MMHG | BODY MASS INDEX: 36.11 KG/M2 | DIASTOLIC BLOOD PRESSURE: 78 MMHG | HEIGHT: 62.99 IN | HEART RATE: 86 BPM | RESPIRATION RATE: 16 BRPM | TEMPERATURE: 98 F

## 2023-08-29 PROCEDURE — 59025 FETAL NON-STRESS TEST: CPT

## 2023-08-29 PROCEDURE — 99213 OFFICE O/P EST LOW 20 MIN: CPT

## 2023-09-01 ENCOUNTER — ROUTINE PRENATAL (OUTPATIENT)
Facility: CLINIC | Age: 33
End: 2023-09-01
Payer: COMMERCIAL

## 2023-09-01 VITALS
WEIGHT: 203.38 LBS | SYSTOLIC BLOOD PRESSURE: 110 MMHG | HEART RATE: 97 BPM | BODY MASS INDEX: 36.04 KG/M2 | DIASTOLIC BLOOD PRESSURE: 78 MMHG | HEIGHT: 63 IN

## 2023-09-01 DIAGNOSIS — O43.193 MARGINAL INSERTION OF UMBILICAL CORD AFFECTING MANAGEMENT OF MOTHER IN THIRD TRIMESTER: ICD-10-CM

## 2023-09-01 DIAGNOSIS — O09.893 SHORT INTERVAL BETWEEN PREGNANCIES AFFECTING PREGNANCY IN THIRD TRIMESTER, ANTEPARTUM: ICD-10-CM

## 2023-09-01 DIAGNOSIS — O9A.219 TRAUMA DURING PREGNANCY: ICD-10-CM

## 2023-09-01 DIAGNOSIS — O99.283 HYPOTHYROIDISM AFFECTING PREGNANCY IN THIRD TRIMESTER: ICD-10-CM

## 2023-09-01 DIAGNOSIS — E03.9 HYPOTHYROIDISM AFFECTING PREGNANCY IN THIRD TRIMESTER: ICD-10-CM

## 2023-09-01 DIAGNOSIS — Z34.83 PRENATAL CARE, SUBSEQUENT PREGNANCY IN THIRD TRIMESTER: ICD-10-CM

## 2023-09-01 DIAGNOSIS — O99.810 ABNORMAL GLUCOSE TOLERANCE TEST (GTT) DURING PREGNANCY, ANTEPARTUM: ICD-10-CM

## 2023-09-01 DIAGNOSIS — E66.09 OTHER OBESITY DUE TO EXCESS CALORIES AFFECTING PREGNANCY IN THIRD TRIMESTER: Primary | ICD-10-CM

## 2023-09-01 DIAGNOSIS — O99.213 OTHER OBESITY DUE TO EXCESS CALORIES AFFECTING PREGNANCY IN THIRD TRIMESTER: Primary | ICD-10-CM

## 2023-09-14 ENCOUNTER — ULTRASOUND ENCOUNTER (OUTPATIENT)
Dept: PERINATAL CARE | Facility: HOSPITAL | Age: 33
End: 2023-09-14
Attending: OBSTETRICS & GYNECOLOGY
Payer: COMMERCIAL

## 2023-09-14 VITALS
SYSTOLIC BLOOD PRESSURE: 122 MMHG | DIASTOLIC BLOOD PRESSURE: 80 MMHG | WEIGHT: 204 LBS | BODY MASS INDEX: 36 KG/M2 | HEART RATE: 87 BPM

## 2023-09-14 DIAGNOSIS — O99.213 OBESITY AFFECTING PREGNANCY IN THIRD TRIMESTER, UNSPECIFIED OBESITY TYPE: ICD-10-CM

## 2023-09-14 DIAGNOSIS — E06.3 HASHIMOTO'S THYROIDITIS: ICD-10-CM

## 2023-09-14 DIAGNOSIS — E06.3 HYPOTHYROIDISM, ACQUIRED, AUTOIMMUNE: ICD-10-CM

## 2023-09-14 DIAGNOSIS — Z86.16 HISTORY OF COVID-19: ICD-10-CM

## 2023-09-14 DIAGNOSIS — O99.213 OBESITY AFFECTING PREGNANCY IN THIRD TRIMESTER: ICD-10-CM

## 2023-09-14 DIAGNOSIS — O99.213 OBESITY AFFECTING PREGNANCY IN THIRD TRIMESTER: Primary | ICD-10-CM

## 2023-09-14 PROCEDURE — 76816 OB US FOLLOW-UP PER FETUS: CPT | Performed by: OBSTETRICS & GYNECOLOGY

## 2023-09-14 PROCEDURE — 76819 FETAL BIOPHYS PROFIL W/O NST: CPT

## 2023-09-15 ENCOUNTER — ROUTINE PRENATAL (OUTPATIENT)
Facility: CLINIC | Age: 33
End: 2023-09-15
Payer: COMMERCIAL

## 2023-09-15 VITALS
HEIGHT: 63 IN | SYSTOLIC BLOOD PRESSURE: 116 MMHG | WEIGHT: 204.38 LBS | HEART RATE: 82 BPM | BODY MASS INDEX: 36.21 KG/M2 | DIASTOLIC BLOOD PRESSURE: 70 MMHG

## 2023-09-15 DIAGNOSIS — Z34.83 ENCOUNTER FOR SUPERVISION OF OTHER NORMAL PREGNANCY IN THIRD TRIMESTER: Primary | ICD-10-CM

## 2023-09-15 PROCEDURE — 3078F DIAST BP <80 MM HG: CPT | Performed by: STUDENT IN AN ORGANIZED HEALTH CARE EDUCATION/TRAINING PROGRAM

## 2023-09-15 PROCEDURE — 3074F SYST BP LT 130 MM HG: CPT | Performed by: STUDENT IN AN ORGANIZED HEALTH CARE EDUCATION/TRAINING PROGRAM

## 2023-09-15 PROCEDURE — 3008F BODY MASS INDEX DOCD: CPT | Performed by: STUDENT IN AN ORGANIZED HEALTH CARE EDUCATION/TRAINING PROGRAM

## 2023-09-25 ENCOUNTER — TELEPHONE (OUTPATIENT)
Facility: CLINIC | Age: 33
End: 2023-09-25

## 2023-09-25 NOTE — TELEPHONE ENCOUNTER
Sacrolilac Support order was received from [employer]. Order form was placed in Dr. Cris Cho bin for signature.

## 2023-09-28 ENCOUNTER — ROUTINE PRENATAL (OUTPATIENT)
Facility: CLINIC | Age: 33
End: 2023-09-28
Payer: COMMERCIAL

## 2023-09-28 VITALS
WEIGHT: 208.63 LBS | HEART RATE: 65 BPM | BODY MASS INDEX: 36.96 KG/M2 | HEIGHT: 63 IN | DIASTOLIC BLOOD PRESSURE: 62 MMHG | SYSTOLIC BLOOD PRESSURE: 106 MMHG

## 2023-09-28 DIAGNOSIS — Z34.83 PRENATAL CARE, SUBSEQUENT PREGNANCY IN THIRD TRIMESTER: Primary | ICD-10-CM

## 2023-09-28 DIAGNOSIS — Z3A.34 34 WEEKS GESTATION OF PREGNANCY: ICD-10-CM

## 2023-09-28 DIAGNOSIS — Z23 NEED FOR VACCINATION: ICD-10-CM

## 2023-09-28 PROCEDURE — 3078F DIAST BP <80 MM HG: CPT | Performed by: OBSTETRICS & GYNECOLOGY

## 2023-09-28 PROCEDURE — 90686 IIV4 VACC NO PRSV 0.5 ML IM: CPT | Performed by: OBSTETRICS & GYNECOLOGY

## 2023-09-28 PROCEDURE — 3008F BODY MASS INDEX DOCD: CPT | Performed by: OBSTETRICS & GYNECOLOGY

## 2023-09-28 PROCEDURE — 3074F SYST BP LT 130 MM HG: CPT | Performed by: OBSTETRICS & GYNECOLOGY

## 2023-09-28 PROCEDURE — 90471 IMMUNIZATION ADMIN: CPT | Performed by: OBSTETRICS & GYNECOLOGY
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
## 2023-09-30 ENCOUNTER — LAB ENCOUNTER (OUTPATIENT)
Dept: LAB | Age: 33
End: 2023-09-30
Attending: OBSTETRICS & GYNECOLOGY
Payer: COMMERCIAL

## 2023-09-30 DIAGNOSIS — E03.9 HYPOTHYROIDISM AFFECTING PREGNANCY IN THIRD TRIMESTER: ICD-10-CM

## 2023-09-30 DIAGNOSIS — O99.283 HYPOTHYROIDISM AFFECTING PREGNANCY IN THIRD TRIMESTER: ICD-10-CM

## 2023-09-30 DIAGNOSIS — O99.810 ABNORMAL GLUCOSE TOLERANCE TEST (GTT) DURING PREGNANCY, ANTEPARTUM: ICD-10-CM

## 2023-09-30 LAB
EST. AVERAGE GLUCOSE BLD GHB EST-MCNC: 105 MG/DL (ref 68–126)
HBA1C MFR BLD: 5.3 % (ref ?–5.7)
TSI SER-ACNC: 1.02 MIU/ML (ref 0.36–3.74)

## 2023-09-30 PROCEDURE — 83036 HEMOGLOBIN GLYCOSYLATED A1C: CPT

## 2023-09-30 PROCEDURE — 36415 COLL VENOUS BLD VENIPUNCTURE: CPT

## 2023-09-30 PROCEDURE — 84443 ASSAY THYROID STIM HORMONE: CPT

## 2023-10-06 ENCOUNTER — ROUTINE PRENATAL (OUTPATIENT)
Dept: OBGYN CLINIC | Facility: CLINIC | Age: 33
End: 2023-10-06
Payer: COMMERCIAL

## 2023-10-06 VITALS
BODY MASS INDEX: 37 KG/M2 | WEIGHT: 207.06 LBS | HEART RATE: 88 BPM | DIASTOLIC BLOOD PRESSURE: 76 MMHG | SYSTOLIC BLOOD PRESSURE: 122 MMHG

## 2023-10-06 DIAGNOSIS — O99.213 OTHER OBESITY DUE TO EXCESS CALORIES AFFECTING PREGNANCY IN THIRD TRIMESTER: ICD-10-CM

## 2023-10-06 DIAGNOSIS — O43.199 MARGINAL INSERTION OF UMBILICAL CORD AFFECTING MANAGEMENT OF MOTHER: ICD-10-CM

## 2023-10-06 DIAGNOSIS — Z3A.36 36 WEEKS GESTATION OF PREGNANCY: Primary | ICD-10-CM

## 2023-10-06 DIAGNOSIS — O09.893 SHORT INTERVAL BETWEEN PREGNANCIES AFFECTING PREGNANCY IN THIRD TRIMESTER, ANTEPARTUM: ICD-10-CM

## 2023-10-06 DIAGNOSIS — E66.09 OTHER OBESITY DUE TO EXCESS CALORIES AFFECTING PREGNANCY IN THIRD TRIMESTER: ICD-10-CM

## 2023-10-06 PROCEDURE — 3078F DIAST BP <80 MM HG: CPT

## 2023-10-06 PROCEDURE — 3074F SYST BP LT 130 MM HG: CPT

## 2023-10-06 PROCEDURE — 87653 STREP B DNA AMP PROBE: CPT

## 2023-10-06 PROCEDURE — 59025 FETAL NON-STRESS TEST: CPT

## 2023-10-06 PROCEDURE — 87081 CULTURE SCREEN ONLY: CPT
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
## 2023-10-08 LAB — GROUP B STREP BY PCR FOR PCR OVT: NEGATIVE

## 2023-10-11 ENCOUNTER — TELEPHONE (OUTPATIENT)
Facility: CLINIC | Age: 33
End: 2023-10-11

## 2023-10-11 NOTE — TELEPHONE ENCOUNTER
Breast Pump order was received from [employer]. Order form was placed in Dr. Jude Jerome's bin for signature.

## 2023-10-13 ENCOUNTER — HOSPITAL ENCOUNTER (OUTPATIENT)
Facility: HOSPITAL | Age: 33
Discharge: HOME | End: 2023-10-13
Attending: OBSTETRICS & GYNECOLOGY | Admitting: OBSTETRICS & GYNECOLOGY
Payer: COMMERCIAL

## 2023-10-13 ENCOUNTER — ROUTINE PRENATAL (OUTPATIENT)
Facility: CLINIC | Age: 33
End: 2023-10-13
Payer: COMMERCIAL

## 2023-10-13 VITALS
BODY MASS INDEX: 37.21 KG/M2 | RESPIRATION RATE: 20 BRPM | DIASTOLIC BLOOD PRESSURE: 83 MMHG | HEIGHT: 63 IN | SYSTOLIC BLOOD PRESSURE: 123 MMHG | TEMPERATURE: 98 F | HEART RATE: 80 BPM | WEIGHT: 210 LBS

## 2023-10-13 VITALS
DIASTOLIC BLOOD PRESSURE: 84 MMHG | SYSTOLIC BLOOD PRESSURE: 128 MMHG | HEART RATE: 99 BPM | HEIGHT: 63 IN | BODY MASS INDEX: 37.28 KG/M2 | WEIGHT: 210.38 LBS

## 2023-10-13 DIAGNOSIS — O99.210 SEVERE OBESITY DUE TO EXCESS CALORIES AFFECTING PREGNANCY, ANTEPARTUM (HCC): ICD-10-CM

## 2023-10-13 DIAGNOSIS — E66.01 SEVERE OBESITY DUE TO EXCESS CALORIES AFFECTING PREGNANCY, ANTEPARTUM (HCC): ICD-10-CM

## 2023-10-13 DIAGNOSIS — Z34.83 PRENATAL CARE, SUBSEQUENT PREGNANCY IN THIRD TRIMESTER: Primary | ICD-10-CM

## 2023-10-13 DIAGNOSIS — Z3A.37 37 WEEKS GESTATION OF PREGNANCY: ICD-10-CM

## 2023-10-13 PROCEDURE — 99212 OFFICE O/P EST SF 10 MIN: CPT

## 2023-10-13 PROCEDURE — 3008F BODY MASS INDEX DOCD: CPT | Performed by: OBSTETRICS & GYNECOLOGY

## 2023-10-13 PROCEDURE — 3074F SYST BP LT 130 MM HG: CPT | Performed by: OBSTETRICS & GYNECOLOGY

## 2023-10-13 PROCEDURE — 3079F DIAST BP 80-89 MM HG: CPT | Performed by: OBSTETRICS & GYNECOLOGY

## 2023-10-13 PROCEDURE — 59025 FETAL NON-STRESS TEST: CPT

## 2023-10-13 NOTE — PROGRESS NOTES
Pt is a 29year old female admitted to [facility]/TRG2-A. Patient presents with:  Non-stress Test: Pt to have NST in office today but closed at 1500. Positive fetal movement. Denies contractions, LOF and/or vaginal bleeding. Pt is  36w6d intra-uterine pregnancy. History obtained. Oriented to room, staff, and plan of care.
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
## 2023-10-20 ENCOUNTER — ROUTINE PRENATAL (OUTPATIENT)
Facility: CLINIC | Age: 33
End: 2023-10-20
Payer: COMMERCIAL

## 2023-10-20 VITALS
BODY MASS INDEX: 36.92 KG/M2 | HEIGHT: 63 IN | DIASTOLIC BLOOD PRESSURE: 84 MMHG | SYSTOLIC BLOOD PRESSURE: 126 MMHG | WEIGHT: 208.38 LBS | HEART RATE: 84 BPM

## 2023-10-20 DIAGNOSIS — O99.213 OTHER OBESITY DUE TO EXCESS CALORIES AFFECTING PREGNANCY IN THIRD TRIMESTER: Primary | ICD-10-CM

## 2023-10-20 DIAGNOSIS — E66.09 OTHER OBESITY DUE TO EXCESS CALORIES AFFECTING PREGNANCY IN THIRD TRIMESTER: Primary | ICD-10-CM

## 2023-10-20 PROCEDURE — 3079F DIAST BP 80-89 MM HG: CPT | Performed by: STUDENT IN AN ORGANIZED HEALTH CARE EDUCATION/TRAINING PROGRAM

## 2023-10-20 PROCEDURE — 59025 FETAL NON-STRESS TEST: CPT | Performed by: STUDENT IN AN ORGANIZED HEALTH CARE EDUCATION/TRAINING PROGRAM

## 2023-10-20 PROCEDURE — 3008F BODY MASS INDEX DOCD: CPT | Performed by: STUDENT IN AN ORGANIZED HEALTH CARE EDUCATION/TRAINING PROGRAM

## 2023-10-20 PROCEDURE — 3074F SYST BP LT 130 MM HG: CPT | Performed by: STUDENT IN AN ORGANIZED HEALTH CARE EDUCATION/TRAINING PROGRAM
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
## 2023-10-24 ENCOUNTER — TELEPHONE (OUTPATIENT)
Facility: CLINIC | Age: 33
End: 2023-10-24

## 2023-10-24 ENCOUNTER — LAB ENCOUNTER (OUTPATIENT)
Dept: LAB | Age: 33
End: 2023-10-24

## 2023-10-24 ENCOUNTER — ROUTINE PRENATAL (OUTPATIENT)
Facility: CLINIC | Age: 33
End: 2023-10-24

## 2023-10-24 VITALS
HEIGHT: 63 IN | HEART RATE: 103 BPM | DIASTOLIC BLOOD PRESSURE: 84 MMHG | SYSTOLIC BLOOD PRESSURE: 132 MMHG | WEIGHT: 209 LBS | BODY MASS INDEX: 37.03 KG/M2

## 2023-10-24 DIAGNOSIS — R04.0 FREQUENT NOSEBLEEDS: ICD-10-CM

## 2023-10-24 DIAGNOSIS — R03.0 ELEVATED BP WITHOUT DIAGNOSIS OF HYPERTENSION: ICD-10-CM

## 2023-10-24 DIAGNOSIS — E66.09 OTHER OBESITY DUE TO EXCESS CALORIES AFFECTING PREGNANCY IN THIRD TRIMESTER: ICD-10-CM

## 2023-10-24 DIAGNOSIS — R04.0 FREQUENT NOSEBLEEDS: Primary | ICD-10-CM

## 2023-10-24 DIAGNOSIS — O99.213 OTHER OBESITY DUE TO EXCESS CALORIES AFFECTING PREGNANCY IN THIRD TRIMESTER: ICD-10-CM

## 2023-10-24 LAB
ALBUMIN SERPL-MCNC: 2.8 G/DL (ref 3.4–5)
ALBUMIN/GLOB SERPL: 0.7 {RATIO} (ref 1–2)
ALP LIVER SERPL-CCNC: 345 U/L
ALT SERPL-CCNC: 27 U/L
ANION GAP SERPL CALC-SCNC: 10 MMOL/L (ref 0–18)
AST SERPL-CCNC: 31 U/L (ref 15–37)
BASOPHILS # BLD AUTO: 0.03 X10(3) UL (ref 0–0.2)
BASOPHILS NFR BLD AUTO: 0.3 %
BILIRUB SERPL-MCNC: 0.5 MG/DL (ref 0.1–2)
BUN BLD-MCNC: 8 MG/DL (ref 7–18)
CALCIUM BLD-MCNC: 9.6 MG/DL (ref 8.5–10.1)
CHLORIDE SERPL-SCNC: 107 MMOL/L (ref 98–112)
CO2 SERPL-SCNC: 21 MMOL/L (ref 21–32)
CREAT BLD-MCNC: 0.8 MG/DL
EGFRCR SERPLBLD CKD-EPI 2021: 100 ML/MIN/1.73M2 (ref 60–?)
EOSINOPHIL # BLD AUTO: 0.04 X10(3) UL (ref 0–0.7)
EOSINOPHIL NFR BLD AUTO: 0.4 %
ERYTHROCYTE [DISTWIDTH] IN BLOOD BY AUTOMATED COUNT: 15 %
FASTING STATUS PATIENT QL REPORTED: NO
GLOBULIN PLAS-MCNC: 4.3 G/DL (ref 2.8–4.4)
GLUCOSE BLD-MCNC: 109 MG/DL (ref 70–99)
HCT VFR BLD AUTO: 40.7 %
HGB BLD-MCNC: 13.4 G/DL
IMM GRANULOCYTES # BLD AUTO: 0.03 X10(3) UL (ref 0–1)
IMM GRANULOCYTES NFR BLD: 0.3 %
LYMPHOCYTES # BLD AUTO: 1.88 X10(3) UL (ref 1–4)
LYMPHOCYTES NFR BLD AUTO: 19.1 %
MCH RBC QN AUTO: 28.7 PG (ref 26–34)
MCHC RBC AUTO-ENTMCNC: 32.9 G/DL (ref 31–37)
MCV RBC AUTO: 87.2 FL
MONOCYTES # BLD AUTO: 0.45 X10(3) UL (ref 0.1–1)
MONOCYTES NFR BLD AUTO: 4.6 %
NEUTROPHILS # BLD AUTO: 7.4 X10 (3) UL (ref 1.5–7.7)
NEUTROPHILS # BLD AUTO: 7.4 X10(3) UL (ref 1.5–7.7)
NEUTROPHILS NFR BLD AUTO: 75.3 %
OSMOLALITY SERPL CALC.SUM OF ELEC: 285 MOSM/KG (ref 275–295)
PLATELET # BLD AUTO: 282 10(3)UL (ref 150–450)
POTASSIUM SERPL-SCNC: 3.6 MMOL/L (ref 3.5–5.1)
PROT SERPL-MCNC: 7.1 G/DL (ref 6.4–8.2)
RBC # BLD AUTO: 4.67 X10(6)UL
SODIUM SERPL-SCNC: 138 MMOL/L (ref 136–145)
URATE SERPL-MCNC: 5.3 MG/DL
WBC # BLD AUTO: 9.8 X10(3) UL (ref 4–11)

## 2023-10-24 PROCEDURE — 85025 COMPLETE CBC W/AUTO DIFF WBC: CPT

## 2023-10-24 PROCEDURE — 84550 ASSAY OF BLOOD/URIC ACID: CPT

## 2023-10-24 PROCEDURE — 3079F DIAST BP 80-89 MM HG: CPT

## 2023-10-24 PROCEDURE — 59025 FETAL NON-STRESS TEST: CPT

## 2023-10-24 PROCEDURE — 80053 COMPREHEN METABOLIC PANEL: CPT

## 2023-10-24 PROCEDURE — 3075F SYST BP GE 130 - 139MM HG: CPT

## 2023-10-24 PROCEDURE — 3008F BODY MASS INDEX DOCD: CPT

## 2023-10-24 PROCEDURE — 36415 COLL VENOUS BLD VENIPUNCTURE: CPT
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
## 2023-10-24 NOTE — TELEPHONE ENCOUNTER
38 weeks had few nose bleeds and some cramps - cramps resolved. 10/20/23    She checked her BP   It was 139/88 - sitting down, resting 5 mins ac taking her BP  131/92 0538     130/96 0500     127/79 10/23/23 2000    Denies  bleeding/leaking, blurred vision, visual spots, epigastric pain. Swollen hand for past few weeks - not new onset    FM+     Does have migraines - does not take any medication    Advised to come in for evaluation. Patient verbalized understanding, agreed to and intend to comply with plan of care.

## 2023-10-24 NOTE — TELEPHONE ENCOUNTER
Patient is 38 weeks pregnant and has started to have nose bleeds.  She checked her BP   It was 139/88,  130/96  131/92

## 2023-10-27 ENCOUNTER — ROUTINE PRENATAL (OUTPATIENT)
Dept: OBGYN CLINIC | Facility: CLINIC | Age: 33
End: 2023-10-27

## 2023-10-27 VITALS
HEIGHT: 63 IN | SYSTOLIC BLOOD PRESSURE: 117 MMHG | HEART RATE: 80 BPM | DIASTOLIC BLOOD PRESSURE: 72 MMHG | WEIGHT: 211.63 LBS | BODY MASS INDEX: 37.5 KG/M2

## 2023-10-27 DIAGNOSIS — O99.213 OTHER OBESITY DUE TO EXCESS CALORIES AFFECTING PREGNANCY IN THIRD TRIMESTER: Primary | ICD-10-CM

## 2023-10-27 DIAGNOSIS — E66.09 OTHER OBESITY DUE TO EXCESS CALORIES AFFECTING PREGNANCY IN THIRD TRIMESTER: Primary | ICD-10-CM

## 2023-10-27 PROCEDURE — 3008F BODY MASS INDEX DOCD: CPT

## 2023-10-27 PROCEDURE — 59025 FETAL NON-STRESS TEST: CPT

## 2023-10-27 PROCEDURE — 3078F DIAST BP <80 MM HG: CPT

## 2023-10-27 PROCEDURE — 3074F SYST BP LT 130 MM HG: CPT
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
## 2023-10-30 ENCOUNTER — HOSPITAL ENCOUNTER (INPATIENT)
Facility: HOSPITAL | Age: 33
LOS: 2 days | Discharge: HOME | End: 2023-11-01
Attending: OBSTETRICS & GYNECOLOGY | Admitting: OBSTETRICS & GYNECOLOGY
Payer: COMMERCIAL

## 2023-10-30 PROBLEM — Z34.90 PREGNANCY: Status: ACTIVE | Noted: 2023-10-30

## 2023-10-30 PROBLEM — Z34.90 PREGNANCY (HCC): Status: ACTIVE | Noted: 2023-10-30

## 2023-10-30 LAB
ANTIBODY SCREEN: NEGATIVE
BASOPHILS # BLD AUTO: 0.03 X10(3) UL (ref 0–0.2)
BASOPHILS NFR BLD AUTO: 0.3 %
EOSINOPHIL # BLD AUTO: 0.03 X10(3) UL (ref 0–0.7)
EOSINOPHIL NFR BLD AUTO: 0.3 %
ERYTHROCYTE [DISTWIDTH] IN BLOOD BY AUTOMATED COUNT: 14.9 %
HCT VFR BLD AUTO: 39.8 %
HGB BLD-MCNC: 13.6 G/DL
IMM GRANULOCYTES # BLD AUTO: 0.03 X10(3) UL (ref 0–1)
IMM GRANULOCYTES NFR BLD: 0.3 %
LYMPHOCYTES # BLD AUTO: 1.66 X10(3) UL (ref 1–4)
LYMPHOCYTES NFR BLD AUTO: 17.8 %
MCH RBC QN AUTO: 29.3 PG (ref 26–34)
MCHC RBC AUTO-ENTMCNC: 34.2 G/DL (ref 31–37)
MCV RBC AUTO: 85.8 FL
MONOCYTES # BLD AUTO: 0.44 X10(3) UL (ref 0.1–1)
MONOCYTES NFR BLD AUTO: 4.7 %
NEUTROPHILS # BLD AUTO: 7.15 X10 (3) UL (ref 1.5–7.7)
NEUTROPHILS # BLD AUTO: 7.15 X10(3) UL (ref 1.5–7.7)
NEUTROPHILS NFR BLD AUTO: 76.6 %
PLATELET # BLD AUTO: 252 10(3)UL (ref 150–450)
RBC # BLD AUTO: 4.64 X10(6)UL
RH BLOOD TYPE: POSITIVE
T PALLIDUM AB SER QL IA: NONREACTIVE
WBC # BLD AUTO: 9.3 X10(3) UL (ref 4–11)

## 2023-10-30 PROCEDURE — 86780 TREPONEMA PALLIDUM: CPT | Performed by: OBSTETRICS & GYNECOLOGY

## 2023-10-30 PROCEDURE — 85025 COMPLETE CBC W/AUTO DIFF WBC: CPT | Performed by: OBSTETRICS & GYNECOLOGY

## 2023-10-30 PROCEDURE — 99214 OFFICE O/P EST MOD 30 MIN: CPT

## 2023-10-30 PROCEDURE — 86850 RBC ANTIBODY SCREEN: CPT | Performed by: OBSTETRICS & GYNECOLOGY

## 2023-10-30 PROCEDURE — 86900 BLOOD TYPING SEROLOGIC ABO: CPT | Performed by: OBSTETRICS & GYNECOLOGY

## 2023-10-30 PROCEDURE — 86901 BLOOD TYPING SEROLOGIC RH(D): CPT | Performed by: OBSTETRICS & GYNECOLOGY

## 2023-10-30 PROCEDURE — 0HQ9XZZ REPAIR PERINEUM SKIN, EXTERNAL APPROACH: ICD-10-PCS | Performed by: OBSTETRICS & GYNECOLOGY

## 2023-10-30 RX ORDER — ACETAMINOPHEN 500 MG
1000 TABLET ORAL EVERY 6 HOURS PRN
Status: DISCONTINUED | OUTPATIENT
Start: 2023-10-30 | End: 2023-11-01

## 2023-10-30 RX ORDER — IBUPROFEN 600 MG/1
600 TABLET ORAL EVERY 6 HOURS
Status: DISCONTINUED | OUTPATIENT
Start: 2023-10-30 | End: 2023-11-01

## 2023-10-30 RX ORDER — DOCUSATE SODIUM 100 MG/1
100 CAPSULE, LIQUID FILLED ORAL
Status: DISCONTINUED | OUTPATIENT
Start: 2023-10-30 | End: 2023-11-01

## 2023-10-30 RX ORDER — ACETAMINOPHEN 500 MG
500 TABLET ORAL EVERY 6 HOURS PRN
Status: DISCONTINUED | OUTPATIENT
Start: 2023-10-30 | End: 2023-10-30 | Stop reason: HOSPADM

## 2023-10-30 RX ORDER — ONDANSETRON 2 MG/ML
4 INJECTION INTRAMUSCULAR; INTRAVENOUS EVERY 6 HOURS PRN
Status: DISCONTINUED | OUTPATIENT
Start: 2023-10-30 | End: 2023-10-30 | Stop reason: HOSPADM

## 2023-10-30 RX ORDER — ACETAMINOPHEN 500 MG
1000 TABLET ORAL EVERY 6 HOURS PRN
Status: DISCONTINUED | OUTPATIENT
Start: 2023-10-30 | End: 2023-10-30 | Stop reason: HOSPADM

## 2023-10-30 RX ORDER — CITRIC ACID/SODIUM CITRATE 334-500MG
30 SOLUTION, ORAL ORAL AS NEEDED
Status: DISCONTINUED | OUTPATIENT
Start: 2023-10-30 | End: 2023-10-30 | Stop reason: HOSPADM

## 2023-10-30 RX ORDER — SODIUM CHLORIDE, SODIUM LACTATE, POTASSIUM CHLORIDE, CALCIUM CHLORIDE 600; 310; 30; 20 MG/100ML; MG/100ML; MG/100ML; MG/100ML
INJECTION, SOLUTION INTRAVENOUS CONTINUOUS
Status: DISCONTINUED | OUTPATIENT
Start: 2023-10-30 | End: 2023-10-30 | Stop reason: HOSPADM

## 2023-10-30 RX ORDER — IBUPROFEN 600 MG/1
600 TABLET ORAL ONCE AS NEEDED
Status: DISCONTINUED | OUTPATIENT
Start: 2023-10-30 | End: 2023-10-30 | Stop reason: HOSPADM

## 2023-10-30 RX ORDER — ACETAMINOPHEN 500 MG
500 TABLET ORAL EVERY 6 HOURS PRN
Status: DISCONTINUED | OUTPATIENT
Start: 2023-10-30 | End: 2023-11-01

## 2023-10-30 RX ORDER — SIMETHICONE 80 MG
80 TABLET,CHEWABLE ORAL 3 TIMES DAILY PRN
Status: DISCONTINUED | OUTPATIENT
Start: 2023-10-30 | End: 2023-11-01

## 2023-10-30 RX ORDER — DEXTROSE, SODIUM CHLORIDE, SODIUM LACTATE, POTASSIUM CHLORIDE, AND CALCIUM CHLORIDE 5; .6; .31; .03; .02 G/100ML; G/100ML; G/100ML; G/100ML; G/100ML
INJECTION, SOLUTION INTRAVENOUS AS NEEDED
Status: DISCONTINUED | OUTPATIENT
Start: 2023-10-30 | End: 2023-10-30 | Stop reason: HOSPADM

## 2023-10-30 RX ORDER — BISACODYL 10 MG
10 SUPPOSITORY, RECTAL RECTAL ONCE AS NEEDED
Status: DISCONTINUED | OUTPATIENT
Start: 2023-10-30 | End: 2023-11-01

## 2023-10-30 RX ORDER — TERBUTALINE SULFATE 1 MG/ML
0.25 INJECTION, SOLUTION SUBCUTANEOUS AS NEEDED
Status: DISCONTINUED | OUTPATIENT
Start: 2023-10-30 | End: 2023-10-30 | Stop reason: HOSPADM

## 2023-10-30 RX ORDER — LEVOTHYROXINE SODIUM 0.07 MG/1
75 TABLET ORAL
Status: DISCONTINUED | OUTPATIENT
Start: 2023-10-31 | End: 2023-11-01

## 2023-10-30 NOTE — PROGRESS NOTES
Patient admitted to mother baby unit with infant. Hugs and kiss tag applied and bonded in labor and delivery. Id bands checked with labor nurse. Call light within reach.

## 2023-10-30 NOTE — PROGRESS NOTES
Patient up to bathroom with assist x 2. Voided 300 at this time. Patient transferred to mother/baby room 2196 per wheelchair in stable condition with baby and personal belongings. Accompanied by significant other and staff. Report given to mother/baby RN.

## 2023-10-30 NOTE — PROGRESS NOTES
Pt is a 29year old female admitted to [facility]. Patient presents with:  R/o Labor     Pt is  39w2d intra-uterine pregnancy. History obtained. Oriented to room, staff, and plan of care.

## 2023-10-30 NOTE — PLAN OF CARE
Problem: BIRTH - VAGINAL/ SECTION  Goal: Fetal and maternal status remain reassuring during the birth process  Description: INTERVENTIONS:  - Monitor vital signs  - Monitor fetal heart rate  - Monitor uterine activity  - Monitor labor progression (vaginal delivery)  - DVT prophylaxis (C/S delivery)  - Surgical antibiotic prophylaxis (C/S delivery)  10/30/2023 1512 by Lacey Rahman RN  Outcome: Completed  10/30/2023 1111 by Lacey Rahman RN  Outcome: Progressing     Problem: PAIN - ADULT  Goal: Verbalizes/displays adequate comfort level or patient's stated pain goal  Description: INTERVENTIONS:  - Encourage pt to monitor pain and request assistance  - Assess pain using appropriate pain scale  - Administer analgesics based on type and severity of pain and evaluate response  - Implement non-pharmacological measures as appropriate and evaluate response  - Consider cultural and social influences on pain and pain management  - Manage/alleviate anxiety  - Utilize distraction and/or relaxation techniques  - Monitor for opioid side effects  - Notify MD/LIP if interventions unsuccessful or patient reports new pain  - Anticipate increased pain with activity and pre-medicate as appropriate  10/30/2023 1512 by Lacey Rahman RN  Outcome: Completed  10/30/2023 1111 by Lacey Rahman RN  Outcome: Progressing     Problem: ANXIETY  Goal: Will report anxiety at manageable levels  Description: INTERVENTIONS:  - Administer medication as ordered  - Teach and rehearse alternative coping skills  - Provide emotional support with 1:1 interaction with staff  10/30/2023 1512 by Lacey Rahman RN  Outcome: Completed  10/30/2023 1111 by Lacey Rahman RN  Outcome: Progressing

## 2023-10-30 NOTE — L&D DELIVERY NOTE
Harjinder Handley, Girl [[id number]]      Labor Events     labor?: No   steroids?: None  Antibiotics received during labor?: No  Rupture date/time: 10/30/2023 1431     Rupture type: SROM  Fluid color: Clear  Intrapartum & labor complications: None       Labor Event Times    Labor onset date/time: 10/30/2023 0930  Dilation complete date/time: 10/30/2023 1430  Start pushing date/time: 10/30/2023 1430       [place] Presentation    Presentation: Vertex  Position: Occiput Anterior       Operative Delivery    Operative Vaginal Delivery: N/A                      Shoulder Dystocia    Shoulder Dystocia: N/A             Anesthesia    Method: None              [place] Delivery      Delivery date/time:  10/30/23 14:40:45   Delivery type: Normal spontaneous vaginal delivery    Details:     Delivery location: delivery room       Delivery Providers    Delivering Clinician: Pauly Larkin DO   Delivery personnel:  Provider Role   Martina Arnold RN Baby Nurse   Olesya García, RN Delivery Nurse             Cord    Vessels: 3 Vessels  Complications: None  Timed cord clamping: Yes  Time in sec: 30  Cord blood disposition: to lab  Gases sent?: No       Resuscitation    Method: None        Measurements      Weight: 3280 g 7 lb 3.7 oz Length: 50.8 cm     Head circum.: 34.5 cm Chest circum. : 34 cm          Abdominal circum.: 30 cm           Placenta    Date/time: 10/30/2023 1441  Removal: Spontaneous  Appearance: Intact  Disposition: held for future pathology       Apgars    Living status: Living   Apgar Scoring Key:    0 1 2    Skin color Blue or pale Acrocyanotic Completely pink    Heart rate Absent <100 bpm >100 bpm    Reflex irritability No response Grimace Cry or active withdrawal    Muscle tone Limp Some flexion Active motion    Respiratory effort Absent Weak cry; hypoventilation Good, crying              1 Minute:  5 Minute:  10 Minute:  15 Minute:  20 Minute:      Skin color: 1  1       Heart rate: 2 2       Reflex irritablity: 2  2       Muscle tone: 2  2       Respiratory effort: 2  2       Total: 9  9          Apgars assigned by: Mikayla Safe       Skin to Skin    Skin to skin initiated date/time: 10/30/2023 1452  Skin to skin with: Mother       Vaginal Count    Initial count RN: Melody Mcgowan RN  Initial count Tech: Donato Quails, Jojo   Sponges   Sharps    Initial counts 11   0    Final counts 11   1    Final count RN: Melody Mcgowan RN  Final count MD: Gracy Reeder, DO       Delivery (Maternal)    Episiotomy: None  Perineal lacerations: 1st Repaired?: Yes     Vaginal laceration?: No      Cervical laceration?: No    Clitoral laceration?: No                  29 y.o.  at 39w2d with  femlale infant. APGARS 9/9, weight 7lb4oz. Body and shoulders easily delivered, cord clamped x2 and cut after 30 seconds. Placenta delivered spontaneously, intact. 1st degree perineal laceration repaired with 2-0 chromic.  Good hemostasis,

## 2023-10-30 NOTE — PLAN OF CARE
Problem: Patient/Family Goals  Goal: Patient/Family Long Term Goal  Description: Patient's Long Term Goal: To have a healthy infant    Interventions:  -   - See additional Care Plan goals for specific interventions  Outcome: Progressing  Goal: Patient/Family Short Term Goal  Description: Patient's Short Term Goal: Uncomplicated vaginal delivery    Interventions:   -   - See additional Care Plan goals for specific interventions  Outcome: Progressing     Problem: BIRTH - VAGINAL/ SECTION  Goal: Fetal and maternal status remain reassuring during the birth process  Description: INTERVENTIONS:  - Monitor vital signs  - Monitor fetal heart rate  - Monitor uterine activity  - Monitor labor progression (vaginal delivery)  - DVT prophylaxis (C/S delivery)  - Surgical antibiotic prophylaxis (C/S delivery)  Outcome: Progressing     Problem: PAIN - ADULT  Goal: Verbalizes/displays adequate comfort level or patient's stated pain goal  Description: INTERVENTIONS:  - Encourage pt to monitor pain and request assistance  - Assess pain using appropriate pain scale  - Administer analgesics based on type and severity of pain and evaluate response  - Implement non-pharmacological measures as appropriate and evaluate response  - Consider cultural and social influences on pain and pain management  - Manage/alleviate anxiety  - Utilize distraction and/or relaxation techniques  - Monitor for opioid side effects  - Notify MD/LIP if interventions unsuccessful or patient reports new pain  - Anticipate increased pain with activity and pre-medicate as appropriate  Outcome: Progressing     Problem: ANXIETY  Goal: Will report anxiety at manageable levels  Description: INTERVENTIONS:  - Administer medication as ordered  - Teach and rehearse alternative coping skills  - Provide emotional support with 1:1 interaction with staff  Outcome: Progressing     Problem: PAIN - ADULT  Goal: Verbalizes/displays adequate comfort level or patient's stated pain goal  Description: INTERVENTIONS:  - Encourage pt to monitor pain and request assistance  - Assess pain using appropriate pain scale  - Administer analgesics based on type and severity of pain and evaluate response  - Implement non-pharmacological measures as appropriate and evaluate response  - Consider cultural and social influences on pain and pain management  - Manage/alleviate anxiety  - Utilize distraction and/or relaxation techniques  - Monitor for opioid side effects  - Notify MD/LIP if interventions unsuccessful or patient reports new pain  - Anticipate increased pain with activity and pre-medicate as appropriate  Outcome: Progressing

## 2023-10-31 LAB
BASOPHILS # BLD AUTO: 0.02 X10(3) UL (ref 0–0.2)
BASOPHILS NFR BLD AUTO: 0.2 %
EOSINOPHIL # BLD AUTO: 0.1 X10(3) UL (ref 0–0.7)
EOSINOPHIL NFR BLD AUTO: 0.9 %
ERYTHROCYTE [DISTWIDTH] IN BLOOD BY AUTOMATED COUNT: 14.8 %
HCT VFR BLD AUTO: 33.5 %
HGB BLD-MCNC: 11.2 G/DL
IMM GRANULOCYTES # BLD AUTO: 0.06 X10(3) UL (ref 0–1)
IMM GRANULOCYTES NFR BLD: 0.5 %
LYMPHOCYTES # BLD AUTO: 3.04 X10(3) UL (ref 1–4)
LYMPHOCYTES NFR BLD AUTO: 27.2 %
MCH RBC QN AUTO: 29.6 PG (ref 26–34)
MCHC RBC AUTO-ENTMCNC: 33.4 G/DL (ref 31–37)
MCV RBC AUTO: 88.4 FL
MONOCYTES # BLD AUTO: 0.73 X10(3) UL (ref 0.1–1)
MONOCYTES NFR BLD AUTO: 6.5 %
NEUTROPHILS # BLD AUTO: 7.23 X10 (3) UL (ref 1.5–7.7)
NEUTROPHILS # BLD AUTO: 7.23 X10(3) UL (ref 1.5–7.7)
NEUTROPHILS NFR BLD AUTO: 64.7 %
PLATELET # BLD AUTO: 222 10(3)UL (ref 150–450)
RBC # BLD AUTO: 3.79 X10(6)UL
WBC # BLD AUTO: 11.2 X10(3) UL (ref 4–11)

## 2023-10-31 PROCEDURE — 85025 COMPLETE CBC W/AUTO DIFF WBC: CPT | Performed by: OBSTETRICS & GYNECOLOGY

## 2023-10-31 RX ORDER — IBUPROFEN 600 MG/1
600 TABLET ORAL EVERY 6 HOURS
Qty: 60 TABLET | Refills: 0 | Status: SHIPPED | OUTPATIENT
Start: 2023-10-31

## 2023-10-31 RX ORDER — PSEUDOEPHEDRINE HCL 30 MG
100 TABLET ORAL 2 TIMES DAILY PRN
Qty: 30 CAPSULE | Refills: 0 | Status: SHIPPED | OUTPATIENT
Start: 2023-10-31

## 2023-11-01 VITALS
HEIGHT: 63 IN | WEIGHT: 211 LBS | TEMPERATURE: 98 F | DIASTOLIC BLOOD PRESSURE: 72 MMHG | SYSTOLIC BLOOD PRESSURE: 117 MMHG | RESPIRATION RATE: 16 BRPM | BODY MASS INDEX: 37.39 KG/M2 | HEART RATE: 74 BPM

## 2023-11-01 NOTE — PLAN OF CARE
Problem: POSTPARTUM  Goal: Long Term Goal:Experiences normal postpartum course  Description: INTERVENTIONS:  - Assess and monitor vital signs and lab values. - Assess fundus and lochia. - Provide ice/sitz baths for perineum discomfort. - Monitor healing of incision/episiotomy/laceration, and assess for signs and symptoms of infection and hematoma. - Assess bladder function and monitor for bladder distention.  - Provide/instruct/assist with pericare as needed. - Provide VTE prophylaxis as needed. - Monitor bowel function.  - Encourage ambulation and provide assistance as needed. - Assess and monitor emotional status and provide social service/psych resources as needed. - Utilize standard precautions and use personal protective equipment as indicated. Ensure aseptic care of all intravenous lines and invasive tubes/drains.  - Obtain immunization and exposure to communicable diseases history. 10/31/2023 2039 by Mumtaz Armenta RN  Outcome: Progressing  10/31/2023 2038 by Mumtaz Armenta RN  Outcome: Progressing  10/31/2023 2037 by Mumtaz Armenta RN  Outcome: Progressing  Goal: Optimize infant feeding at the breast  Description: INTERVENTIONS:  - Initiate breast feeding within first hour after birth. - Monitor effectiveness of current breast feeding efforts. - Assess support systems available to mother/family.  - Identify cultural beliefs/practices regarding lactation, letdown techniques, maternal food preferences. - Assess mother's knowledge and previous experience with breast feeding.  - Provide information as needed about early infant feeding cues (e.g., rooting, lip smacking, sucking fingers/hand) versus late cue of crying.  - Discuss/demonstrate breast feeding aids (e.g., infant sling, nursing footstool/pillows, and breast pumps). - Encourage mother/other family members to express feelings/concerns, and actively listen.   - Educate father/SO about benefits of breast feeding and how to manage common lactation challenges. - Recommend avoidance of specific medications or substances incompatible with breast feeding.  - Assess and monitor for signs of nipple pain/trauma. - Instruct and provide assistance with proper latch. - Review techniques for milk expression (breast pumping) and storage of breast milk. Provide pumping equipment/supplies, instructions and assistance, as needed. - Encourage rooming-in and breast feeding on demand.  - Encourage skin-to-skin contact. - Provide LC support as needed. - Assess for and manage engorgement. - Provide breast feeding education handouts and information on community breast feeding support. 10/31/2023 2039 by Negrito Sarah RN  Outcome: Progressing  10/31/2023 2038 by Negrito Sarah RN  Outcome: Progressing  10/31/2023 2037 by Negrito Sarah RN  Outcome: Progressing  Goal: Establishment of adequate milk supply with medication/procedure interruptions  Description: INTERVENTIONS:  - Review techniques for milk expression (breast pumping). - Provide pumping equipment/supplies, instructions, and assistance until it is safe to breastfeed infant. 10/31/2023 2039 by Negrito Sarah RN  Outcome: Progressing  10/31/2023 2038 by Negrito Sarah RN  Outcome: Progressing  10/31/2023 2037 by Negrito Sarah RN  Outcome: Progressing  Goal: Appropriate maternal -  bonding  Description: INTERVENTIONS:  - Assess caregiver- interactions. - Assess caregiver's emotional status and coping mechanisms. - Encourage caregiver to participate in  daily care. - Assess support systems available to mother/family.  - Provide /case management support as needed.   10/31/2023 2039 by Negrito Sarah RN  Outcome: Progressing  10/31/2023 2038 by Negrito Sarah RN  Outcome: Progressing  10/31/2023 2037 by Negrito Sarah RN  Outcome: Progressing

## 2023-11-01 NOTE — PROGRESS NOTES
Patient up and about, AOx4, VSS, desires to go home, has checked BP x3, OB notified, ok to go home, postpartum care,  care and high blood pressure management reviewed and spouse at bedside very supportive, and instructed to call for any unusual findings, patient verbalized understanding, signed paper, hugs and kisses off

## 2023-11-02 ENCOUNTER — PATIENT OUTREACH (OUTPATIENT)
Dept: CASE MANAGEMENT | Age: 33
End: 2023-11-02

## 2023-11-02 NOTE — PROGRESS NOTES
OBGYN Post Partum apt request (delivered 10/30)    NAIF Jones  [facility]  [address] [facility], [address] [phone]  BP check 3-5 days -   6w -   LVM to call [phone] to assist w/scheduling apt

## 2023-11-03 ENCOUNTER — TELEPHONE (OUTPATIENT)
Dept: OBGYN UNIT | Facility: HOSPITAL | Age: 33
End: 2023-11-03

## 2023-11-03 NOTE — PROGRESS NOTES
OBGYN Post Partum apt request (delivered 10/30)     NAIF Oakes  [facility]  [address] [facility], [facility] [address]  [phone]  BP check 3-5 days -   6w -   Multiple attempts w/no calls back; no apt made  Closing encounter

## 2023-12-12 ENCOUNTER — POSTPARTUM (OUTPATIENT)
Facility: CLINIC | Age: 33
End: 2023-12-12
Payer: COMMERCIAL

## 2023-12-12 VITALS
HEIGHT: 63 IN | BODY MASS INDEX: 33.66 KG/M2 | HEART RATE: 65 BPM | SYSTOLIC BLOOD PRESSURE: 118 MMHG | RESPIRATION RATE: 18 BRPM | WEIGHT: 190 LBS | DIASTOLIC BLOOD PRESSURE: 70 MMHG

## 2023-12-12 PROCEDURE — 3008F BODY MASS INDEX DOCD: CPT

## 2023-12-12 PROCEDURE — 3074F SYST BP LT 130 MM HG: CPT

## 2023-12-12 PROCEDURE — 3078F DIAST BP <80 MM HG: CPT

## 2023-12-12 NOTE — PROGRESS NOTES
HPI    Anoop Nur is a 29year old female  here for 6 week post-partum visit. Patient delivered a  female infant on 10/30/2023 via . Patient desires condoms for contraception. Patient is breast feeding. Patient denies symptoms of depression, [place]  depression scale score of 5 out of 30. [place]  DEPRESSION SCALE  I have been able to laugh and see the funny side of things: As much as I always could  I have looked forward with enjoyment to things: As much as I ever did  I have been anxious or worried for no good reason: Yes, sometimes  I have felt scared or panicky for no good reason: No, not much  Things have been getting on top of me: No, most of the time I have coped quite well  I have been so unhappy that I have had difficulty sleeping: Not at all  I have felt sad or miserable: No, not at all  I have been so unhappy that I have been crying: No, never  The thought of harming myself has occurred to me: Never  Total: 5    OBSTETRIC HISTORY  OB History    Para Term  AB Living   3 2 2 0 1 2   SAB IAB Ectopic Multiple Live Births   1 0 0 0 2      # Outcome Date GA Lbr Yaya/2nd Weight Sex Delivery Anes PTL Lv   3 Term 10/30/23 39w2d 05:00 / 00:10 7 lb 3.7 oz (3.28 kg) F NORMAL SPONT None N KAILEE   2 Term 22 39w0d 02:55 / 00:06 6 lb 5.2 oz (2.87 kg) F NORMAL SPONT None N KAILEE      Complications: Variable decelerations   1 SAB 2021 10w0d             Obstetric Comments   2022 - obesity   Current - obesity, short interval, hypothyroidism, marginal cord insertion        GYNE HISTORY        MEDICATIONS:    Current Outpatient Medications:     Prenatabs Rx 29-1 MG Oral Tab, Take 1 tablet by mouth daily. , Disp: , Rfl:     Levothyroxine Sodium 75 MCG Oral Tab, Take 1 tablet Mon - Sat and SKIP it on Sundays, Disp: 78 tablet, Rfl: 3    ALLERGIES:    Allergies   Allergen Reactions    Sulfa Antibiotics HIVES       PHYSICAL EXAM  Blood pressure 118/70, pulse 65, resp. rate 18, height 63\", weight 190 lb (86.2 kg), currently breastfeeding. General:  Well nourished, well developed woman in no acute distress  Abdomen:  soft, nontender, no masses  External Genitalia: normal appearance, hair distribution, and no lesions  Vagina:  Normal appearance without lesions, no abnormal discharge  Cervix:  Normal without tenderness on motion  Uterus: normal in size, contour, position, mobility, without tenderness  Adnexa: normal without masses or tenderness  Perineum: well healed perineum  Anus: no hemorroids       ASSESSMENT/PLAN  There are no diagnoses linked to this encounter. Discussed all options of birthcontrol including ocps, minipill, Mirena or Paragard IUD, nuvaring, orthoevra patch, nexplanon, Depoprovera, condoms or tubal sterilization options. Patient has chosen condom. Patient to return for annual gyne exam in May.

## 2024-05-31 ENCOUNTER — LAB ENCOUNTER (OUTPATIENT)
Dept: LAB | Age: 34
End: 2024-05-31
Attending: INTERNAL MEDICINE
Payer: COMMERCIAL

## 2024-05-31 ENCOUNTER — OFFICE VISIT (OUTPATIENT)
Dept: INTERNAL MEDICINE CLINIC | Facility: CLINIC | Age: 34
End: 2024-05-31

## 2024-05-31 VITALS
HEIGHT: 63.5 IN | RESPIRATION RATE: 12 BRPM | WEIGHT: 188.81 LBS | OXYGEN SATURATION: 99 % | BODY MASS INDEX: 33.04 KG/M2 | TEMPERATURE: 98 F | HEART RATE: 69 BPM | SYSTOLIC BLOOD PRESSURE: 113 MMHG | DIASTOLIC BLOOD PRESSURE: 60 MMHG

## 2024-05-31 DIAGNOSIS — R10.10 UPPER ABDOMINAL PAIN: ICD-10-CM

## 2024-05-31 DIAGNOSIS — R00.2 PALPITATIONS: ICD-10-CM

## 2024-05-31 DIAGNOSIS — Z00.00 ENCOUNTER FOR PREVENTATIVE ADULT HEALTH CARE EXAMINATION: ICD-10-CM

## 2024-05-31 DIAGNOSIS — Z00.00 ENCOUNTER FOR PREVENTATIVE ADULT HEALTH CARE EXAMINATION: Primary | ICD-10-CM

## 2024-05-31 PROBLEM — O9A.219 TRAUMA DURING PREGNANCY (HCC): Status: RESOLVED | Noted: 2023-08-28 | Resolved: 2024-05-31

## 2024-05-31 PROBLEM — O43.193 MARGINAL INSERTION OF UMBILICAL CORD AFFECTING MANAGEMENT OF MOTHER IN THIRD TRIMESTER (HCC): Status: RESOLVED | Noted: 2023-09-01 | Resolved: 2024-05-31

## 2024-05-31 LAB
ALBUMIN SERPL-MCNC: 4.5 G/DL (ref 3.2–4.8)
ALBUMIN/GLOB SERPL: 1.5 {RATIO} (ref 1–2)
ALP LIVER SERPL-CCNC: 101 U/L
ALT SERPL-CCNC: 38 U/L
ANION GAP SERPL CALC-SCNC: 7 MMOL/L (ref 0–18)
AST SERPL-CCNC: 34 U/L (ref ?–34)
BASOPHILS # BLD AUTO: 0.02 X10(3) UL (ref 0–0.2)
BASOPHILS NFR BLD AUTO: 0.2 %
BILIRUB SERPL-MCNC: 0.6 MG/DL (ref 0.3–1.2)
BUN BLD-MCNC: 10 MG/DL (ref 9–23)
BUN/CREAT SERPL: 14.7 (ref 10–20)
CALCIUM BLD-MCNC: 9.1 MG/DL (ref 8.7–10.4)
CHLORIDE SERPL-SCNC: 107 MMOL/L (ref 98–112)
CHOLEST SERPL-MCNC: 213 MG/DL (ref ?–200)
CO2 SERPL-SCNC: 25 MMOL/L (ref 21–32)
CREAT BLD-MCNC: 0.68 MG/DL
DEPRECATED RDW RBC AUTO: 42.2 FL (ref 35.1–46.3)
EGFRCR SERPLBLD CKD-EPI 2021: 119 ML/MIN/1.73M2 (ref 60–?)
EOSINOPHIL # BLD AUTO: 0.05 X10(3) UL (ref 0–0.7)
EOSINOPHIL NFR BLD AUTO: 0.6 %
ERYTHROCYTE [DISTWIDTH] IN BLOOD BY AUTOMATED COUNT: 12.9 % (ref 11–15)
EST. AVERAGE GLUCOSE BLD GHB EST-MCNC: 94 MG/DL (ref 68–126)
FASTING PATIENT LIPID ANSWER: YES
FASTING STATUS PATIENT QL REPORTED: YES
GLOBULIN PLAS-MCNC: 3 G/DL (ref 2–3.5)
GLUCOSE BLD-MCNC: 83 MG/DL (ref 70–99)
HBA1C MFR BLD: 4.9 % (ref ?–5.7)
HCT VFR BLD AUTO: 43.8 %
HDLC SERPL-MCNC: 47 MG/DL (ref 40–59)
HGB BLD-MCNC: 15 G/DL
IMM GRANULOCYTES # BLD AUTO: 0.03 X10(3) UL (ref 0–1)
IMM GRANULOCYTES NFR BLD: 0.3 %
LDLC SERPL CALC-MCNC: 149 MG/DL (ref ?–100)
LYMPHOCYTES # BLD AUTO: 2.2 X10(3) UL (ref 1–4)
LYMPHOCYTES NFR BLD AUTO: 24.9 %
MCH RBC QN AUTO: 30.4 PG (ref 26–34)
MCHC RBC AUTO-ENTMCNC: 34.2 G/DL (ref 31–37)
MCV RBC AUTO: 88.8 FL
MONOCYTES # BLD AUTO: 0.59 X10(3) UL (ref 0.1–1)
MONOCYTES NFR BLD AUTO: 6.7 %
NEUTROPHILS # BLD AUTO: 5.93 X10 (3) UL (ref 1.5–7.7)
NEUTROPHILS # BLD AUTO: 5.93 X10(3) UL (ref 1.5–7.7)
NEUTROPHILS NFR BLD AUTO: 67.3 %
NONHDLC SERPL-MCNC: 166 MG/DL (ref ?–130)
OSMOLALITY SERPL CALC.SUM OF ELEC: 286 MOSM/KG (ref 275–295)
PLATELET # BLD AUTO: 305 10(3)UL (ref 150–450)
POTASSIUM SERPL-SCNC: 4.1 MMOL/L (ref 3.5–5.1)
PROT SERPL-MCNC: 7.5 G/DL (ref 5.7–8.2)
RBC # BLD AUTO: 4.93 X10(6)UL
SODIUM SERPL-SCNC: 139 MMOL/L (ref 136–145)
TRIGL SERPL-MCNC: 92 MG/DL (ref 30–149)
TSI SER-ACNC: 0.79 MIU/ML (ref 0.55–4.78)
VLDLC SERPL CALC-MCNC: 17 MG/DL (ref 0–30)
WBC # BLD AUTO: 8.8 X10(3) UL (ref 4–11)

## 2024-05-31 PROCEDURE — 84443 ASSAY THYROID STIM HORMONE: CPT

## 2024-05-31 PROCEDURE — 80061 LIPID PANEL: CPT

## 2024-05-31 PROCEDURE — 99395 PREV VISIT EST AGE 18-39: CPT | Performed by: INTERNAL MEDICINE

## 2024-05-31 PROCEDURE — 85025 COMPLETE CBC W/AUTO DIFF WBC: CPT

## 2024-05-31 PROCEDURE — 3074F SYST BP LT 130 MM HG: CPT | Performed by: INTERNAL MEDICINE

## 2024-05-31 PROCEDURE — 80053 COMPREHEN METABOLIC PANEL: CPT

## 2024-05-31 PROCEDURE — 83036 HEMOGLOBIN GLYCOSYLATED A1C: CPT

## 2024-05-31 PROCEDURE — 3078F DIAST BP <80 MM HG: CPT | Performed by: INTERNAL MEDICINE

## 2024-05-31 PROCEDURE — 3008F BODY MASS INDEX DOCD: CPT | Performed by: INTERNAL MEDICINE

## 2024-05-31 NOTE — PROGRESS NOTES
Shawanda Hernandez is a 33 year old female.   HPI:     Chief Complaint   Patient presents with    CPX     Patient presents for CPX/wellness examination.  Diet: Still not the best  Exercise: Not really any regular exercise  Vision: Wears glasses - last eye exam was June 2023  Dental: Up to date    Acute issues:  Intermittent abdominal pain/cramping pain in upper abdomen.  Not related to food/eating.  Can last for an hour.    She has episodes of palpitations/heart racing.  Symptoms happen once a week.  Triggers a cough.     Chronic issues:  Hypothyroidism - following with Endocrinology, on levothyroxine therapy.  Palpitations as above.    Past medical, family, surgical and social history were reviewed as listed in the chart, had pregnancy/delivery since last visit.  REVIEW OF SYSTEMS:   GENERAL/ const: no fevers/chills, no unintentional weight loss  SKIN: denies any unusual skin lesions  EYES:no vision problems  HEENT: denies sinus pain or sinus tenderness  LUNGS: denies shortness of breath   CARDIOVASCULAR: intermittent palpitations; denies chest pain  GI: upper abdominal pain; denies nausea/emesis/diarrhea constipation  : denies dysuria   MUSCULOSKELETAL: no arthralgias  NEURO: denies headaches  PSYCHIATRIC: denies issues  ENDOCRINE: no hot/cold intolerance  ALLERGY:   Allergies   Allergen Reactions    Sulfa Antibiotics HIVES     PAST HISTORY:     Current Outpatient Medications:     Levothyroxine Sodium 75 MCG Oral Tab, Take 1 tablet Mon - Sat and SKIP it on Sundays, Disp: 78 tablet, Rfl: 3  Medical:  has a past medical history of Allergic urticaria (05/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  Surgical:  has a past surgical history that includes insert intrauterine device and remove intrauterine device.  Family: family history includes Diabetes in her maternal grandmother; Hypertension in her paternal grandmother; Lipids in her maternal grandmother and mother; Obesity in her maternal grandmother and mother; Stroke in her father and paternal grandfather; Thyroid disease in an other family member.  Social:  reports that she has never smoked. She has never used smokeless tobacco. She reports that she does not currently use alcohol. She reports that she does not use drugs.  Wt Readings from Last 6 Encounters:   24 188 lb 12.8 oz (85.6 kg)   23 190 lb (86.2 kg)   10/30/23 211 lb (95.7 kg)   10/27/23 211 lb 10.3 oz (96 kg)   10/24/23 209 lb (94.8 kg)   10/20/23 208 lb 6.4 oz (94.5 kg)     EXAM:   /60 (BP Location: Left arm, Patient Position: Sitting, Cuff Size: large)   Pulse 69   Temp 98.4 °F (36.9 °C) (Temporal)   Resp 12   Ht 5' 3.5\" (1.613 m)   Wt 188 lb 12.8 oz (85.6 kg)   LMP 04/10/2024 (Exact Date)   SpO2 99%   Breastfeeding Yes   BMI 32.92 kg/m²   GENERAL: Alert and oriented, well developed, well nourished,in no apparent distress  SKIN: no rashes,no suspicious lesions  HEENT: atraumatic, PERRLA, EOMI, normal lid and conjunctiva, normal external canals and tympanic membranes bilaterally  NECK: supple, no jvd, no thyromegaly, no palpable/tender cervical lymphadenopathy  LUNGS: clear to auscultation bilaterally, no wheezing/rubs  CARDIO: RRR without murmurs.  No clubbing, cyanosis or edema.  GI: soft non tender nondistended no hepatosplenomegaly, bowel sounds throughout  NEURO: CN II-XII intact, 5/5 strength all extremities  MS: Full ROM, no joint pain  PSYCH: pleasant, appropriate mood and affect  ASSESSMENT AND PLAN:   1.  Encounter for preventative adult health  examination  Age appropriate health guidance and counseling provided.  Screening labs ordered as below.  Body mass index is 32.92 kg/m².  Focus on lifestyle modification.  Sees gynecology, up to date with pap smear.    - CBC With Differential With Platelet; Future  - Comp Metabolic Panel (14); Future  - Hemoglobin A1C; Future  - Lipid Panel; Future  - TSH W Reflex To Free T4; Future    2. Palpitations  Patient with intermittent episodes of palpitations.  Once a week.  Interestingly these episodes are terminated with coughing spells.  Will check Holter monitor.  Likely will have patient follow up with EP.  Patient works at [employer] - order printed out so she can schedule there.  - CARD MONITOR HOLTER 24 HOUR (CPT=93225); Future    3. Upper abdominal pain  For the past few months.  Intermittent abdominal pain/cramping across upper abdomen.  No correlation with food/eating.  No change in bowel movements.  Will monitor for now, check general labs.  Advised patient to contact us if symptoms not better within 3-4 weeks - may refer to GI at that point.  - Comp Metabolic Panel (14); Future    Patient Care Team:  Jennifer Thakkar MD as PCP - General (Internal Medicine)  Natacha Preston MD (ENDOCRINOLOGY)  The patient indicates understanding of these issues and agrees to the plan.  The patient is asked to return to clinic in 12 months for follow up on chronic issues/CPX, or earlier if acute issues arise.    Jennifer Thakkar MD

## 2024-05-31 NOTE — PATIENT INSTRUCTIONS
- Get blood tests done today  - Let us know if abdominal pain does not go away within next 3-4 weeks  - Schedule Holter monitor to look into palpitations  - May have you see a cardiologist based on results.    It was a pleasure seeing you in the clinic today.  Thank you for choosing the [facility] office for your healthcare needs. Please call at [phone] with any questions or concerns.    Jennifer Thakkar MD

## 2024-11-25 ENCOUNTER — ULTRASOUND ENCOUNTER (OUTPATIENT)
Facility: CLINIC | Age: 34
End: 2024-11-25
Payer: COMMERCIAL

## 2024-11-25 ENCOUNTER — INITIAL PRENATAL (OUTPATIENT)
Facility: CLINIC | Age: 34
End: 2024-11-25
Payer: COMMERCIAL

## 2024-11-25 VITALS
DIASTOLIC BLOOD PRESSURE: 62 MMHG | HEART RATE: 74 BPM | BODY MASS INDEX: 33.66 KG/M2 | SYSTOLIC BLOOD PRESSURE: 120 MMHG | HEIGHT: 63.5 IN | WEIGHT: 192.38 LBS

## 2024-11-25 DIAGNOSIS — Z12.4 CERVICAL CANCER SCREENING: ICD-10-CM

## 2024-11-25 DIAGNOSIS — Z34.81 PRENATAL CARE, SUBSEQUENT PREGNANCY IN FIRST TRIMESTER (HCC): Primary | ICD-10-CM

## 2024-11-25 DIAGNOSIS — Z3A.08 8 WEEKS GESTATION OF PREGNANCY (HCC): ICD-10-CM

## 2024-11-25 DIAGNOSIS — E03.9 HYPOTHYROID IN PREGNANCY, ANTEPARTUM (HCC): ICD-10-CM

## 2024-11-25 DIAGNOSIS — O99.280 HYPOTHYROID IN PREGNANCY, ANTEPARTUM (HCC): ICD-10-CM

## 2024-11-25 PROBLEM — E66.01 SEVERE OBESITY DUE TO EXCESS CALORIES AFFECTING PREGNANCY, ANTEPARTUM (HCC): Status: ACTIVE | Noted: 2024-11-25

## 2024-11-25 PROBLEM — O99.210 SEVERE OBESITY DUE TO EXCESS CALORIES AFFECTING PREGNANCY, ANTEPARTUM (HCC): Status: ACTIVE | Noted: 2024-11-25

## 2024-11-25 PROBLEM — O99.810 ABNORMAL GLUCOSE TOLERANCE TEST (GTT) DURING PREGNANCY, ANTEPARTUM (HCC): Status: RESOLVED | Noted: 2023-08-11 | Resolved: 2024-11-25

## 2024-11-25 LAB
APPEARANCE: CLEAR
BILIRUBIN: NEGATIVE
GLUCOSE (URINE DIPSTICK): NEGATIVE MG/DL
KETONES (URINE DIPSTICK): NEGATIVE MG/DL
MULTISTIX LOT#: ABNORMAL NUMERIC
NITRITE, URINE: NEGATIVE
PH, URINE: 6 (ref 4.5–8)
PROTEIN (URINE DIPSTICK): NEGATIVE MG/DL
SPECIFIC GRAVITY: 1.02 (ref 1–1.03)
URINE-COLOR: YELLOW
UROBILINOGEN,SEMI-QN: 0.2 MG/DL (ref 0–1.9)

## 2024-11-25 PROCEDURE — 87491 CHLMYD TRACH DNA AMP PROBE: CPT | Performed by: OBSTETRICS & GYNECOLOGY

## 2024-11-25 PROCEDURE — 87591 N.GONORRHOEAE DNA AMP PROB: CPT | Performed by: OBSTETRICS & GYNECOLOGY

## 2024-11-25 PROCEDURE — 87086 URINE CULTURE/COLONY COUNT: CPT | Performed by: OBSTETRICS & GYNECOLOGY

## 2024-11-25 PROCEDURE — 76801 OB US < 14 WKS SINGLE FETUS: CPT | Performed by: OBSTETRICS & GYNECOLOGY

## 2024-11-25 PROCEDURE — 3078F DIAST BP <80 MM HG: CPT | Performed by: OBSTETRICS & GYNECOLOGY

## 2024-11-25 PROCEDURE — 3074F SYST BP LT 130 MM HG: CPT | Performed by: OBSTETRICS & GYNECOLOGY

## 2024-11-25 PROCEDURE — 87624 HPV HI-RISK TYP POOLED RSLT: CPT | Performed by: OBSTETRICS & GYNECOLOGY

## 2024-11-25 PROCEDURE — 3008F BODY MASS INDEX DOCD: CPT | Performed by: OBSTETRICS & GYNECOLOGY

## 2024-11-25 PROCEDURE — 88175 CYTOPATH C/V AUTO FLUID REDO: CPT | Performed by: OBSTETRICS & GYNECOLOGY

## 2024-11-25 PROCEDURE — 81002 URINALYSIS NONAUTO W/O SCOPE: CPT | Performed by: OBSTETRICS & GYNECOLOGY

## 2024-11-25 RX ORDER — CHOLECALCIFEROL (VITAMIN D3) 25 MCG
1 TABLET,CHEWABLE ORAL DAILY
COMMUNITY

## 2024-11-25 NOTE — PROGRESS NOTES
1st OB    Patient has mild nausea, taking PNV  Denies h/o HSV, blood transfusion, hepatitis    Pap smear today    EDC by LMP c/w 8w1d US    Declines genetic testing    Obesity  - HbA1C, cmp ordered  - limit weight gain  - L2U, 32 wks growth, NST 36 weeks    2. Hypothyroidism  - levothyroxine 75 mcg  - TSH ordered- check every 8-12 weeks

## 2024-11-26 LAB
C TRACH DNA SPEC QL NAA+PROBE: NEGATIVE
HPV E6+E7 MRNA CVX QL NAA+PROBE: NEGATIVE
N GONORRHOEA DNA SPEC QL NAA+PROBE: NEGATIVE

## 2025-01-06 ENCOUNTER — LAB ENCOUNTER (OUTPATIENT)
Dept: LAB | Age: 35
End: 2025-01-06
Attending: OBSTETRICS & GYNECOLOGY
Payer: COMMERCIAL

## 2025-01-06 ENCOUNTER — ROUTINE PRENATAL (OUTPATIENT)
Dept: OBGYN CLINIC | Facility: CLINIC | Age: 35
End: 2025-01-06
Payer: COMMERCIAL

## 2025-01-06 VITALS
HEIGHT: 63.5 IN | WEIGHT: 191.19 LBS | DIASTOLIC BLOOD PRESSURE: 75 MMHG | BODY MASS INDEX: 33.46 KG/M2 | HEART RATE: 77 BPM | SYSTOLIC BLOOD PRESSURE: 121 MMHG

## 2025-01-06 DIAGNOSIS — Z34.81 PRENATAL CARE, SUBSEQUENT PREGNANCY IN FIRST TRIMESTER (HCC): ICD-10-CM

## 2025-01-06 DIAGNOSIS — E03.9 HYPOTHYROID IN PREGNANCY, ANTEPARTUM (HCC): ICD-10-CM

## 2025-01-06 DIAGNOSIS — E66.09 OTHER OBESITY DUE TO EXCESS CALORIES AFFECTING PREGNANCY, ANTEPARTUM (HCC): Primary | ICD-10-CM

## 2025-01-06 DIAGNOSIS — O99.280 HYPOTHYROID IN PREGNANCY, ANTEPARTUM (HCC): ICD-10-CM

## 2025-01-06 DIAGNOSIS — O99.210 OTHER OBESITY DUE TO EXCESS CALORIES AFFECTING PREGNANCY, ANTEPARTUM (HCC): Primary | ICD-10-CM

## 2025-01-06 LAB
ANTIBODY SCREEN: NEGATIVE
BASOPHILS # BLD AUTO: 0.01 X10(3) UL (ref 0–0.2)
BASOPHILS NFR BLD AUTO: 0.1 %
EOSINOPHIL # BLD AUTO: 0.04 X10(3) UL (ref 0–0.7)
EOSINOPHIL NFR BLD AUTO: 0.5 %
ERYTHROCYTE [DISTWIDTH] IN BLOOD BY AUTOMATED COUNT: 12.8 %
EST. AVERAGE GLUCOSE BLD GHB EST-MCNC: 88 MG/DL (ref 68–126)
HBA1C MFR BLD: 4.7 % (ref ?–5.7)
HCT VFR BLD AUTO: 38.9 %
HGB BLD-MCNC: 13.7 G/DL
IMM GRANULOCYTES # BLD AUTO: 0.03 X10(3) UL (ref 0–1)
IMM GRANULOCYTES NFR BLD: 0.4 %
LYMPHOCYTES # BLD AUTO: 1.75 X10(3) UL (ref 1–4)
LYMPHOCYTES NFR BLD AUTO: 22.6 %
MCH RBC QN AUTO: 31.5 PG (ref 26–34)
MCHC RBC AUTO-ENTMCNC: 35.2 G/DL (ref 31–37)
MCV RBC AUTO: 89.4 FL
MONOCYTES # BLD AUTO: 0.55 X10(3) UL (ref 0.1–1)
MONOCYTES NFR BLD AUTO: 7.1 %
NEUTROPHILS # BLD AUTO: 5.36 X10 (3) UL (ref 1.5–7.7)
NEUTROPHILS # BLD AUTO: 5.36 X10(3) UL (ref 1.5–7.7)
NEUTROPHILS NFR BLD AUTO: 69.3 %
PLATELET # BLD AUTO: 268 10(3)UL (ref 150–450)
RBC # BLD AUTO: 4.35 X10(6)UL
RH BLOOD TYPE: POSITIVE
TSI SER-ACNC: 0.71 UIU/ML (ref 0.55–4.78)
WBC # BLD AUTO: 7.7 X10(3) UL (ref 4–11)

## 2025-01-06 PROCEDURE — 84443 ASSAY THYROID STIM HORMONE: CPT | Performed by: OBSTETRICS & GYNECOLOGY

## 2025-01-06 PROCEDURE — 86850 RBC ANTIBODY SCREEN: CPT | Performed by: OBSTETRICS & GYNECOLOGY

## 2025-01-06 PROCEDURE — 86900 BLOOD TYPING SEROLOGIC ABO: CPT | Performed by: OBSTETRICS & GYNECOLOGY

## 2025-01-06 PROCEDURE — 87389 HIV-1 AG W/HIV-1&-2 AB AG IA: CPT | Performed by: OBSTETRICS & GYNECOLOGY

## 2025-01-06 PROCEDURE — 86780 TREPONEMA PALLIDUM: CPT | Performed by: OBSTETRICS & GYNECOLOGY

## 2025-01-06 PROCEDURE — 83036 HEMOGLOBIN GLYCOSYLATED A1C: CPT | Performed by: OBSTETRICS & GYNECOLOGY

## 2025-01-06 PROCEDURE — 86762 RUBELLA ANTIBODY: CPT | Performed by: OBSTETRICS & GYNECOLOGY

## 2025-01-06 PROCEDURE — 87340 HEPATITIS B SURFACE AG IA: CPT | Performed by: OBSTETRICS & GYNECOLOGY

## 2025-01-06 PROCEDURE — 86803 HEPATITIS C AB TEST: CPT | Performed by: OBSTETRICS & GYNECOLOGY

## 2025-01-06 PROCEDURE — 86901 BLOOD TYPING SEROLOGIC RH(D): CPT | Performed by: OBSTETRICS & GYNECOLOGY

## 2025-01-06 PROCEDURE — 85025 COMPLETE CBC W/AUTO DIFF WBC: CPT | Performed by: OBSTETRICS & GYNECOLOGY

## 2025-01-06 NOTE — PROGRESS NOTES
Kevin 14w1d    She is doing well, no complaints.   Reminded to complete PNL!     EDC by LMP c/w 8w1d US     Declines genetic testing     Obesity  - HbA1C, cmp ordered  - limit weight gain  - L2US- discussed w/pt, order placed, encouraged to make an appointment   -32 wks growth, NST 36 weeks     2. Hypothyroidism  - levothyroxine 75 mcg  - TSH ordered- check every 8-12 weeks

## 2025-01-07 LAB
HBV SURFACE AG SER-ACNC: 0.41 [IU]/L
HBV SURFACE AG SERPL QL IA: NONREACTIVE
HCV AB SERPL QL IA: NONREACTIVE
RUBV IGG SER QL: POSITIVE
RUBV IGG SER-ACNC: 59 IU/ML (ref 10–?)
T PALLIDUM AB SER QL IA: NONREACTIVE

## 2025-01-18 ENCOUNTER — TELEPHONE (OUTPATIENT)
Facility: CLINIC | Age: 35
End: 2025-01-18

## 2025-01-18 NOTE — TELEPHONE ENCOUNTER
Received fax from [employer] for FMLA form completion. Sent email to forms dept and interoffice mail.

## 2025-02-04 ENCOUNTER — ROUTINE PRENATAL (OUTPATIENT)
Facility: CLINIC | Age: 35
End: 2025-02-04
Payer: COMMERCIAL

## 2025-02-04 VITALS
SYSTOLIC BLOOD PRESSURE: 118 MMHG | DIASTOLIC BLOOD PRESSURE: 70 MMHG | BODY MASS INDEX: 33.77 KG/M2 | HEART RATE: 83 BPM | WEIGHT: 193 LBS | HEIGHT: 63.5 IN

## 2025-02-04 DIAGNOSIS — O99.210 OTHER OBESITY DUE TO EXCESS CALORIES AFFECTING PREGNANCY, ANTEPARTUM (HCC): Primary | ICD-10-CM

## 2025-02-04 DIAGNOSIS — Z3A.18 18 WEEKS GESTATION OF PREGNANCY (HCC): ICD-10-CM

## 2025-02-04 DIAGNOSIS — E66.09 OTHER OBESITY DUE TO EXCESS CALORIES AFFECTING PREGNANCY, ANTEPARTUM (HCC): Primary | ICD-10-CM

## 2025-02-04 PROCEDURE — 3078F DIAST BP <80 MM HG: CPT

## 2025-02-04 PROCEDURE — 3074F SYST BP LT 130 MM HG: CPT

## 2025-02-04 PROCEDURE — 3008F BODY MASS INDEX DOCD: CPT

## 2025-02-04 NOTE — PROGRESS NOTES
Kevin 18w2d     She is doing well, no complaints     EDC by LMP c/w 8w1d US     Declines genetic testing     Obesity  - HbA1C- 4.7%  - limit weight gain  - L2US- discussed w/pt, order placed, encouraged to make an appointment - has appt 2/18/25  -32 wks growth, NST 36 weeks     2. Hypothyroidism  - levothyroxine 75 mcg  - TSH 0.710 on 1/6/25  - check every 8-12 weeks - plan to recheck with 24-28 wk 1 hr gtt and CBC

## 2025-02-18 ENCOUNTER — ULTRASOUND ENCOUNTER (OUTPATIENT)
Dept: PERINATAL CARE | Facility: HOSPITAL | Age: 35
End: 2025-02-18
Attending: OBSTETRICS & GYNECOLOGY
Payer: COMMERCIAL

## 2025-02-18 ENCOUNTER — OFFICE VISIT (OUTPATIENT)
Dept: PERINATAL CARE | Facility: HOSPITAL | Age: 35
End: 2025-02-18
Payer: COMMERCIAL

## 2025-02-18 VITALS
WEIGHT: 194 LBS | HEIGHT: 63.5 IN | DIASTOLIC BLOOD PRESSURE: 73 MMHG | BODY MASS INDEX: 33.95 KG/M2 | HEART RATE: 88 BPM | SYSTOLIC BLOOD PRESSURE: 119 MMHG

## 2025-02-18 DIAGNOSIS — O99.212 OBESITY AFFECTING PREGNANCY IN SECOND TRIMESTER (HCC): ICD-10-CM

## 2025-02-18 DIAGNOSIS — O99.213 OBESITY AFFECTING PREGNANCY IN THIRD TRIMESTER (HCC): ICD-10-CM

## 2025-02-18 DIAGNOSIS — O99.213 OBESITY AFFECTING PREGNANCY IN THIRD TRIMESTER, UNSPECIFIED OBESITY TYPE (HCC): ICD-10-CM

## 2025-02-18 DIAGNOSIS — E06.3 HYPOTHYROIDISM, ACQUIRED, AUTOIMMUNE: ICD-10-CM

## 2025-02-18 DIAGNOSIS — E06.3 HASHIMOTO'S THYROIDITIS: ICD-10-CM

## 2025-02-18 DIAGNOSIS — O99.213 OBESITY AFFECTING PREGNANCY IN THIRD TRIMESTER (HCC): Primary | ICD-10-CM

## 2025-02-18 DIAGNOSIS — E66.09 OTHER OBESITY DUE TO EXCESS CALORIES AFFECTING PREGNANCY IN THIRD TRIMESTER (HCC): ICD-10-CM

## 2025-02-18 DIAGNOSIS — O99.213 OTHER OBESITY DUE TO EXCESS CALORIES AFFECTING PREGNANCY IN THIRD TRIMESTER (HCC): ICD-10-CM

## 2025-02-18 PROCEDURE — 76811 OB US DETAILED SNGL FETUS: CPT | Performed by: OBSTETRICS & GYNECOLOGY

## 2025-02-18 NOTE — PROGRESS NOTES
Outpatient Maternal-Fetal Medicine Consultation    Dear Dr. Jerome,    Thank you for requesting ultrasound evaluation and maternal fetal medicine consultation on your patient Shawanda Hernandez.  As you are aware she is a 34 year old female with a Naik pregnancy at 20w2d.  A maternal-fetal medicine consultation was requested secondary to class I obesity complicating pregnancy and maternal hypothyroidism.  Her prenatal records and labs were reviewed.    HISTORY  OB History    Para Term  AB Living   4 2 2 0 1 2   SAB IAB Ectopic Multiple Live Births   1 0 0 0 2   Obstetric Comments   2022 - obesity   Current - obesity, short interval, hypothyroidism, marginal cord insertion      # 1 - Date: 2021, Sex: None, Weight: None, GA: 10w0d, Type: None, Apgar1: None, Apgar5: None, Living: None, Birth Comments: None    # 2 - Date: 22, Sex: Female, Weight: 6 lb 5.2 oz (2.87 kg), GA: 39w0d, Type: Normal spontaneous vaginal delivery, Apgar1: 9, Apgar5: 9, Living: Living, Birth Comments: None    # 3 - Date: 10/30/23, Sex: Female, Weight: 7 lb 3.7 oz (3.28 kg), GA: 39w2d, Type: Normal spontaneous vaginal delivery, Apgar1: 9, Apgar5: 9, Living: Living, Birth Comments: None    # 4 - Date: None, Sex: None, Weight: None, GA: None, Type: None, Apgar1: None, Apgar5: None, Living: None, Birth Comments: None    Past Medical History  The patient  has a past medical history of Allergic urticaria (2012), Anxiety, Chronic lymphocytic thyroiditis, Chronic lymphocytic thyroiditis, Dysmenorrhea, Hematuria, unspecified, Hypothyroidism, Hypothyroidism, acquired, autoimmune, Marginal insertion of umbilical cord affecting management of mother in third trimester ([facility]) (2023), Migraine with aura, Obesity (BMI 30-39.9) (), Pyelonephritis, unspecified (2012),  (spontaneous vaginal delivery) ([facility]) (10/31/2023), and Trauma during pregnancy ([facility]) (2023).    She has no past medical history of  Malignant hyperthermia.    Past Surgical History  The patient  has a past surgical history that includes insert intrauterine device and remove intrauterine device.    Family History  The patient She indicated that the status of her other is unknown. She indicated that her mother is alive. She indicated that her father is alive. She indicated that her sister is alive. She indicated that both of her brothers are alive. She indicated that her maternal grandmother is alive. She indicated that her maternal grandfather is alive. She indicated that her paternal grandmother is alive. She indicated that her paternal grandfather is . She indicated that the status of her daughter is unknown. She indicated that the status of her son is unknown.      Medications:   Current Outpatient Medications:     prenatal vitamin with DHA 27-0.8-228 MG Oral Cap, Take 1 capsule by mouth daily., Disp: , Rfl:     Levothyroxine Sodium 75 MCG Oral Tab, Take 1 tablet Mon - Sat and SKIP it on Sundays, Disp: 78 tablet, Rfl: 3  Allergies: Allergies[1]      PHYSICAL EXAMINATION:  /73 (BP Location: Right arm, Patient Position: Sitting, Cuff Size: adult)   Pulse 88   Ht 5' 3.5\" (1.613 m)   Wt 194 lb (88 kg)   LMP 2024 (Exact Date)   BMI 33.83 kg/m²   General: alert and oriented,no acute distress  Abdomen: gravid, soft, non-tender  Extremities: non-tender, no edema        OBSTETRIC ULTRASOUND  The patient had a level 2 ultrasound today which I interpreted the results and reviewed them with the patient.    Ultrasound Findings:  Single IUP in breech presentation.    Placenta is posterior.   A 3 vessel cord is noted.  Cardiac activity is present at 148 bpm   g ( 0 lb 12 oz);   MVP is 5.1 cm .     The fetal measurements are consistent with the established EDC. No ultrasound evidence of structural abnormalities are seen today. The nasal bone is present. No ultrasound evidence of markers for aneuploidy are seen. She understands  that ultrasound exam cannot exclude genetic abnormalities and that genetic testing is recommended. The limitations of ultrasound were discussed.     Uterus and adnexa appeared normal  today on US    See imaging tab for the complete US report.    DISCUSSION  During her visit we discussed and reviewed the following issues:  OBESITY:  Her BMI prior to pregnancy was 33  Obesity during pregnancy is associated with numerous maternal and  risks.  It is not clear whether obesity is a direct cause of adverse pregnancy outcome or whether the association between obesity and adverse pregnancy outcome is due to factors such as diabetes mellitus.   Data suggest that obese women should be encouraged to undertake a weight reduction program (diet, exercise, behavior modification, and possibly bariatric surgery in some cases) prior to attempting to conceive.            Subfertility in obese women is most commonly related to ovulatory dysfunction, and, in some obese women, the ovulatory dysfunction is related to polycystic ovary syndrome (PCOS). It is also important to note that even among ovulatory women, increasing obesity is associated with decreasing spontaneous pregnancy rates.  The increased risk of miscarriage in obese women may be because such women often have PCOS or isolated insulin resistance.                 Due to its strong association with obesity in the general population, type 2 diabetes mellitus is one of the two most common medical complications of the obese . The increased risk of type 2 diabetes is primarily related to an exaggerated increase in insulin resistance in the obese state. It is reasonable to screen obese gravidas for undiagnosed pregestational diabetes in the first trimester.   Glucose intolerance associated with gestational diabetes generally resolves postpartum; however, obese women with a history of gestational diabetes have a two-fold increased prevalence of subsequent type 2  diabetes.           An association between obesity and hypertensive disorders during pregnancy has been consistently reported.  In particular, maternal weight and BMI are independent risk factors for preeclampsia.             Studies have found that the increased risk of  birth in obese gravidas is primarily associated with obesity-related medical and  complications, rather than an intrinsic predisposition to spontaneous  birth. Prevention of  birth in these patients, therefore, should be directed toward prevention or management of medical and obstetrical complications.               Both prepregnancy obesity and excessive maternal weight gain before or during pregnancy contribute to an increased probability of  delivery.  It has also been hypothesized that obesity may lead to dystocia due to increased soft tissue deposition in the maternal pelvis.    delivery in the obese  is associated with numerous perioperative concerns, including emergency delivery, prolonged incision to delivery interval, blood loss >1000 mL, longer operative times, wound infection, thromboembolism, and endometritis.            Maternal obesity appears to be associated with a small increase in the absolute rate of some congenital anomalies (primarily neural tube defect and cardiac), and the risk may increase with increasing maternal weight.  Level II ultrasound is advised for women with obesity.  The risk of neural tube defects increased significantly with maternal weight.    The analysis found that overweight and obese pregnant women experienced significantly more stillbirths than normal weight women.  Third trimester  testing is advised.    We discussed the current recommendations for limited gestational weight gain in pregnancy for overweight and obese women.  The [place] [employer] currently recommends that women keep gestational weight gain to between 8-18 lbs.  We discussed  the role of mild to moderate exercise, healthy food choices and appropriate portions sized to help achieve this goal.  Excess weight gain is associated with higher rates of gestational diabetes, hypertensive complications, fetal macrosomia and delivery complications.  Women with weight loss or insufficient weight gain have higher rates of small for gestational age infants.    A recent study found that initiating moderate exercise in early pregnancy for obese gravidas significantly reduced the incidence of gestational diabetes, gestational hypertension,  deliveries and C-sections.   I encouraged Shawanda to begin moderate exercise such as walking or stationary bike in the pregnancy.    Hypothyroidism has been associated with an increased risk of several complications, including:  ·Preeclampsia and gestational hypertension  ·Placental abruption  ·Nonreassuring fetal heart rate tracing  · delivery, including very  delivery (before 32 weeks)  ·Low birth weight  ·Increased rate of  section  · morbidity and mortality  ·Neuropsychological and cognitive impairment  ·Postpartum hemorrhage    Overt hypothyroidism (elevated TSH, reduced free T4) complicating pregnancy is unusual. Two factors contribute to this finding: some hypothyroid women are anovulatory, and hypothyroidism (new or inadequately treated) complicating pregnancy is associated with an increased rate of first trimester spontaneous .  The risk of complications during pregnancy is lower in women with subclinical, rather than overt hypothyroidism. However, in some studies, women with subclinical hypothyroidism were also reported to be at increased risk for severe preeclampsia,  delivery, placental abruption, and/or pregnancy loss.  Isolated maternal hypothyroxinemia (low T4) is defined as a maternal free T4 concentration in the lower 5th or 10th percentile of the reference range, in conjunction with a normal TSH.  The effect of isolated maternal hypothyroxinemia on  and  outcome is unclear.     All pregnant women with newly diagnosed overt hypothyroidism (thyroid-stimulating hormone [TSH] above trimester-specific normal reference range with low free thyroxine [T4]) should be treated with thyroid hormone (T4). In addition, because maternal euthyroidism is potentially important for normal fetal cognitive development, it is suggested to treat pregnant women with subclinical hypothyroidism. Because of the changes in thyroid physiology during normal pregnancy, thyroid function tests should be interpreted using trimester-specific TSH and T4 reference ranges for pregnant women. The upper limit of normal for TSH in the first trimester of pregnancy is approximately 2.5 mU/L (3.0 mU/L in the second and third trimesters) rather than 4.5 to 5.0 mU/L used by most laboratories. Total T4 and T3 levels during pregnancy are 1.5-fold higher than in nonpregnant women. We do not suggest the treatment of pregnant women with isolated hypothyroxinemia (low free T4, normal TSH).    Thyroid function should be monitored throughout the pregnancy by assessing TSH and FT4 or FT3 about every 8-12 weeks and more frequently if the clinical situation dictates. Adjust the dose appropriately with the goal of maintaining the FT3/FT4 in the upper levels of the normal range and avoid over treatment. Perform ultrasound in the third trimester and as needed to assess growth and potential goiter.     Thyroid:    Lab Results   Component Value Date    TSH 0.710 2025    TSH 0.788 2024    TSH 1.020 2023    T4F 1.1 2021    T4F 1.1 2020    T4F 0.9 2020     We discussed the recommended plan of care based on her  risk factors.  Airam and her significant other, Rafi, had their questions answered to their satisfaction.      IMPRESSION:  IUP at 20w2d  Normal level 2 ultrasound  Class I obesity complicating  pregnancy  Hypothyroidism, controlled    RECOMMENDATIONS:  Continue care with Dr. Jerome  She was advised to limit total weight gain to less than 20 pounds  Monitor TSH every 8 to 12 weeks or more frequently if her dose requires adjustment  Follow-up growth and BPP at 32 weeks  Weekly NST at 36 weeks    Total time spent 40 minutes this calendar day which includes preparing to see the patient including chart review, obtaining and/or reviewing additional medical history, performing a physical exam and evaluation, documenting clinical information in the electronic medical record, independently interpreting results, counseling the patient, communicating results to the patient/family/caregiver and coordinating care.     Case discussed with patient who demonstrated understanding and agreement with plan.     Thank you for allowing me to participate in the care of this patient.  Please feel free to contact me with any questions.    Zena Reddy MD  Maternal-Fetal Medicine       Note to patient and family:  The 21st Century Cures Act makes medical notes available to patients in the interest of transparency.  However, please be advised that this is a medical document.  It is intended as a peer to peer communication.  It is written in medical language and may contain abbreviations or verbiage that are technical and unfamiliar.  It may appear blunt or direct.  Medical documents are intended to carry relevant information, facts as evident, and the clinical opinion of the practitioner.         [1]   Allergies  Allergen Reactions    Sulfa Antibiotics HIVES

## 2025-03-07 ENCOUNTER — ROUTINE PRENATAL (OUTPATIENT)
Facility: CLINIC | Age: 35
End: 2025-03-07
Payer: COMMERCIAL

## 2025-03-07 VITALS
HEART RATE: 80 BPM | BODY MASS INDEX: 35 KG/M2 | DIASTOLIC BLOOD PRESSURE: 56 MMHG | HEIGHT: 63.5 IN | SYSTOLIC BLOOD PRESSURE: 114 MMHG | WEIGHT: 200 LBS

## 2025-03-07 DIAGNOSIS — O99.280 HYPOTHYROID IN PREGNANCY, ANTEPARTUM (HCC): ICD-10-CM

## 2025-03-07 DIAGNOSIS — Z34.90 PRENATAL CARE, ANTEPARTUM (HCC): Primary | ICD-10-CM

## 2025-03-07 DIAGNOSIS — E03.9 HYPOTHYROID IN PREGNANCY, ANTEPARTUM (HCC): ICD-10-CM

## 2025-03-07 PROCEDURE — 3078F DIAST BP <80 MM HG: CPT | Performed by: STUDENT IN AN ORGANIZED HEALTH CARE EDUCATION/TRAINING PROGRAM

## 2025-03-07 PROCEDURE — 3008F BODY MASS INDEX DOCD: CPT | Performed by: STUDENT IN AN ORGANIZED HEALTH CARE EDUCATION/TRAINING PROGRAM

## 2025-03-07 PROCEDURE — 3074F SYST BP LT 130 MM HG: CPT | Performed by: STUDENT IN AN ORGANIZED HEALTH CARE EDUCATION/TRAINING PROGRAM

## 2025-03-07 NOTE — PROGRESS NOTES
Kevin 22w5d     She is doing well overall. Some pubic bone pain, is mild  Feeling fetal movement    EDC by LMP c/w 8w1d US   -Declines genetic testing  -ordered 1h GTT, CBC for 24wk     Obesity  - HbA1C- 4.7%  - limit weight gain  - L2US- normal  -32 wks growth, NST 36 weeks     2. Hypothyroidism  - levothyroxine 75 mcg  - TSH 0.710 on 1/6/25  - check every 8-12 weeks - plan to recheck with 24-28 wk 1 hr gtt and CBC - ordered

## 2025-04-01 ENCOUNTER — LAB ENCOUNTER (OUTPATIENT)
Dept: LAB | Age: 35
End: 2025-04-01
Attending: STUDENT IN AN ORGANIZED HEALTH CARE EDUCATION/TRAINING PROGRAM
Payer: COMMERCIAL

## 2025-04-01 DIAGNOSIS — E03.9 HYPOTHYROID IN PREGNANCY, ANTEPARTUM (HCC): ICD-10-CM

## 2025-04-01 DIAGNOSIS — Z34.90 PRENATAL CARE, ANTEPARTUM (HCC): ICD-10-CM

## 2025-04-01 DIAGNOSIS — O99.280 HYPOTHYROID IN PREGNANCY, ANTEPARTUM (HCC): ICD-10-CM

## 2025-04-01 LAB
BASOPHILS # BLD AUTO: 0.01 X10(3) UL (ref 0–0.2)
BASOPHILS NFR BLD AUTO: 0.1 %
EOSINOPHIL # BLD AUTO: 0.04 X10(3) UL (ref 0–0.7)
EOSINOPHIL NFR BLD AUTO: 0.4 %
ERYTHROCYTE [DISTWIDTH] IN BLOOD BY AUTOMATED COUNT: 13.5 %
GLUCOSE 1H P GLC SERPL-MCNC: 145 MG/DL
HCT VFR BLD AUTO: 39.8 %
HGB BLD-MCNC: 14 G/DL
IMM GRANULOCYTES # BLD AUTO: 0.04 X10(3) UL (ref 0–1)
IMM GRANULOCYTES NFR BLD: 0.4 %
LYMPHOCYTES # BLD AUTO: 1.98 X10(3) UL (ref 1–4)
LYMPHOCYTES NFR BLD AUTO: 20.5 %
MCH RBC QN AUTO: 31.7 PG (ref 26–34)
MCHC RBC AUTO-ENTMCNC: 35.2 G/DL (ref 31–37)
MCV RBC AUTO: 90 FL
MONOCYTES # BLD AUTO: 0.39 X10(3) UL (ref 0.1–1)
MONOCYTES NFR BLD AUTO: 4 %
NEUTROPHILS # BLD AUTO: 7.18 X10 (3) UL (ref 1.5–7.7)
NEUTROPHILS # BLD AUTO: 7.18 X10(3) UL (ref 1.5–7.7)
NEUTROPHILS NFR BLD AUTO: 74.6 %
PLATELET # BLD AUTO: 264 10(3)UL (ref 150–450)
RBC # BLD AUTO: 4.42 X10(6)UL
TSI SER-ACNC: 0.52 UIU/ML (ref 0.55–4.78)
WBC # BLD AUTO: 9.6 X10(3) UL (ref 4–11)

## 2025-04-01 PROCEDURE — 82950 GLUCOSE TEST: CPT | Performed by: STUDENT IN AN ORGANIZED HEALTH CARE EDUCATION/TRAINING PROGRAM

## 2025-04-01 PROCEDURE — 85025 COMPLETE CBC W/AUTO DIFF WBC: CPT | Performed by: STUDENT IN AN ORGANIZED HEALTH CARE EDUCATION/TRAINING PROGRAM

## 2025-04-01 PROCEDURE — 84443 ASSAY THYROID STIM HORMONE: CPT | Performed by: STUDENT IN AN ORGANIZED HEALTH CARE EDUCATION/TRAINING PROGRAM

## 2025-04-02 DIAGNOSIS — R73.09 ELEVATED GLUCOSE TOLERANCE TEST: Primary | ICD-10-CM

## 2025-04-02 NOTE — PROGRESS NOTES
Pt aware of results & recommendations for a 3 hr gtt & follow up with endocrinologist to possibly decrease synthroid. Verbalized understanding.

## 2025-04-04 ENCOUNTER — LABORATORY ENCOUNTER (OUTPATIENT)
Dept: LAB | Facility: HOSPITAL | Age: 35
End: 2025-04-04
Attending: STUDENT IN AN ORGANIZED HEALTH CARE EDUCATION/TRAINING PROGRAM
Payer: COMMERCIAL

## 2025-04-04 ENCOUNTER — ROUTINE PRENATAL (OUTPATIENT)
Facility: CLINIC | Age: 35
End: 2025-04-04
Payer: COMMERCIAL

## 2025-04-04 VITALS
WEIGHT: 203 LBS | HEART RATE: 80 BPM | DIASTOLIC BLOOD PRESSURE: 62 MMHG | HEIGHT: 63.5 IN | BODY MASS INDEX: 35.52 KG/M2 | SYSTOLIC BLOOD PRESSURE: 108 MMHG

## 2025-04-04 DIAGNOSIS — R73.09 ELEVATED GLUCOSE TOLERANCE TEST: ICD-10-CM

## 2025-04-04 DIAGNOSIS — Z3A.26 26 WEEKS GESTATION OF PREGNANCY (HCC): ICD-10-CM

## 2025-04-04 DIAGNOSIS — Z34.82 ENCOUNTER FOR SUPERVISION OF OTHER NORMAL PREGNANCY IN SECOND TRIMESTER (HCC): Primary | ICD-10-CM

## 2025-04-04 LAB
GLUCOSE 1H P GLC SERPL-MCNC: 175 MG/DL
GLUCOSE 2H P GLC SERPL-MCNC: 122 MG/DL
GLUCOSE 3H P GLC SERPL-MCNC: 105 MG/DL (ref 70–140)
GLUCOSE P FAST SERPL-MCNC: 100 MG/DL

## 2025-04-04 PROCEDURE — 3008F BODY MASS INDEX DOCD: CPT | Performed by: STUDENT IN AN ORGANIZED HEALTH CARE EDUCATION/TRAINING PROGRAM

## 2025-04-04 PROCEDURE — 36415 COLL VENOUS BLD VENIPUNCTURE: CPT

## 2025-04-04 PROCEDURE — 82951 GLUCOSE TOLERANCE TEST (GTT): CPT

## 2025-04-04 PROCEDURE — 82952 GTT-ADDED SAMPLES: CPT

## 2025-04-04 PROCEDURE — 3078F DIAST BP <80 MM HG: CPT | Performed by: STUDENT IN AN ORGANIZED HEALTH CARE EDUCATION/TRAINING PROGRAM

## 2025-04-04 PROCEDURE — 3074F SYST BP LT 130 MM HG: CPT | Performed by: STUDENT IN AN ORGANIZED HEALTH CARE EDUCATION/TRAINING PROGRAM

## 2025-04-04 NOTE — PROGRESS NOTES
Kevin 26.5    Doing well, no complaints. + FM. Has messaged her endocrinologist about adjusting synthroid medication - awaiting response     EDC by LMP c/w 8w1d US   -Declines genetic testing  -1 hr GTT & CBC: done, elevated 1 hr --> 3 hr normal       Obesity  - HbA1C- 4.7%  - limit weight gain  - L2US- normal  - 32 wks growth: scheduled 5/16  - NST 36 weeks     2. Hypothyroidism  - levothyroxine 75 mcg  - TSH 0.710 on 1/6/25  - TSH 0.519 on 4/1: pt sent msg to endo about adjusting, awaiting response

## 2025-04-25 ENCOUNTER — LAB ENCOUNTER (OUTPATIENT)
Dept: LAB | Age: 35
End: 2025-04-25
Attending: STUDENT IN AN ORGANIZED HEALTH CARE EDUCATION/TRAINING PROGRAM
Payer: COMMERCIAL

## 2025-04-25 ENCOUNTER — ROUTINE PRENATAL (OUTPATIENT)
Facility: CLINIC | Age: 35
End: 2025-04-25
Payer: COMMERCIAL

## 2025-04-25 VITALS
DIASTOLIC BLOOD PRESSURE: 70 MMHG | BODY MASS INDEX: 35.87 KG/M2 | WEIGHT: 205 LBS | SYSTOLIC BLOOD PRESSURE: 122 MMHG | HEART RATE: 92 BPM | HEIGHT: 63.5 IN

## 2025-04-25 DIAGNOSIS — O99.283 HYPOTHYROIDISM AFFECTING PREGNANCY IN THIRD TRIMESTER (HCC): ICD-10-CM

## 2025-04-25 DIAGNOSIS — Z3A.29 29 WEEKS GESTATION OF PREGNANCY (HCC): ICD-10-CM

## 2025-04-25 DIAGNOSIS — E03.9 HYPOTHYROIDISM AFFECTING PREGNANCY IN THIRD TRIMESTER (HCC): ICD-10-CM

## 2025-04-25 DIAGNOSIS — Z34.83 ENCOUNTER FOR SUPERVISION OF OTHER NORMAL PREGNANCY IN THIRD TRIMESTER (HCC): ICD-10-CM

## 2025-04-25 DIAGNOSIS — Z34.83 ENCOUNTER FOR SUPERVISION OF OTHER NORMAL PREGNANCY IN THIRD TRIMESTER (HCC): Primary | ICD-10-CM

## 2025-04-25 LAB — T PALLIDUM AB SER QL IA: NONREACTIVE

## 2025-04-25 PROCEDURE — 90715 TDAP VACCINE 7 YRS/> IM: CPT | Performed by: STUDENT IN AN ORGANIZED HEALTH CARE EDUCATION/TRAINING PROGRAM

## 2025-04-25 PROCEDURE — 3008F BODY MASS INDEX DOCD: CPT | Performed by: STUDENT IN AN ORGANIZED HEALTH CARE EDUCATION/TRAINING PROGRAM

## 2025-04-25 PROCEDURE — 3074F SYST BP LT 130 MM HG: CPT | Performed by: STUDENT IN AN ORGANIZED HEALTH CARE EDUCATION/TRAINING PROGRAM

## 2025-04-25 PROCEDURE — 87389 HIV-1 AG W/HIV-1&-2 AB AG IA: CPT | Performed by: STUDENT IN AN ORGANIZED HEALTH CARE EDUCATION/TRAINING PROGRAM

## 2025-04-25 PROCEDURE — 86780 TREPONEMA PALLIDUM: CPT | Performed by: STUDENT IN AN ORGANIZED HEALTH CARE EDUCATION/TRAINING PROGRAM

## 2025-04-25 PROCEDURE — 90471 IMMUNIZATION ADMIN: CPT | Performed by: STUDENT IN AN ORGANIZED HEALTH CARE EDUCATION/TRAINING PROGRAM

## 2025-04-25 PROCEDURE — 3078F DIAST BP <80 MM HG: CPT | Performed by: STUDENT IN AN ORGANIZED HEALTH CARE EDUCATION/TRAINING PROGRAM

## 2025-04-25 NOTE — PROGRESS NOTES
Kevin 29.5    Doing well, no complaints. + FM. Has messaged her endocrinologist about adjusting synthroid medication - they told her to make an appt and the next available is September. We will plan to have her take dose 5x/wk instead of 6 and recheck in 1 month     EDC by LMP c/w 8w1d US   -Declines genetic testing  -1 hr GTT & CBC: done, elevated 1 hr --> 3 hr normal  -3rd tri HIV & Tpal: ordered  -Tdap: today      Obesity  - HbA1C- 4.7%  - limit weight gain  - L2US- normal  - 32 wks growth: scheduled 5/16  - NST 36 weeks     2. Hypothyroidism  - levothyroxine 75 mcg  - TSH 0.710 on 1/6/25  - TSH 0.519 on 4/1  - will decrease synthroid 75 mcg to 5x/wk, recheck TSH in 1 month

## 2025-05-15 NOTE — PROGRESS NOTES
Outpatient Maternal-Fetal Medicine Follow-Up     Dear Dr. Jerome,     Thank you for requesting ultrasound evaluation and maternal fetal medicine consultation on your patient Shawanda Hernandez.  As you are aware she is a 34/34 year old female  with a Naik pregnancy and an Estimated Date of Delivery: 25.  She returned to maternal-fetal medicine today for a follow-up visit.  Her history was reviewed from her prior visit and there were no interval changes.     Antepartum Risk Factors  Class I obesity complicating pregnancy  Hypothyroidism    S: She reports good fetal movement.  She has no contractions or concerns.  We discussed that she passed her 3-hour glucose tolerance test.  Her fasting blood sugar was 100 which show she is at risk for impaired glucose tolerance.      PHYSICAL EXAMINATION:  /75 (BP Location: Right arm, Patient Position: Sitting, Cuff Size: adult)   Pulse 84   Ht 5' 3.5\" (1.613 m)   Wt 205 lb (93 kg)   LMP 2024 (Exact Date)   BMI 35.74 kg/m²   General: alert and oriented, no acute distress  Abdomen: gravid, soft, non-tender  Extremities: non-tender, no edema     OBSTETRIC ULTRASOUND  The patient had a follow-up fetal ultrasound today which I interpreted the results and reviewed them with the patient.    Ultrasound Findings:  Single IUP in cephalic presentation.    Placenta is posterior.   A 3 vessel cord is noted.  Cardiac activity is present at 136 bpm  EFW 2285 g ( 5 lb 1 oz); 63% AC 92 nd %ile.    DANISHA is  11.2 cm.  MVP is 4.3 cm  BPP is 8/8.     The fetal measurements are consistent with established EDC. No gross ultrasound evidence of structural abnormalities are seen today. The patient understands that ultrasound cannot rule out all structural and chromosomal abnormalities.     See imaging tab for the complete US report.     DISCUSSION  During her visit we discussed and reviewed the following issues:  OBESITY:  Her BMI prior to pregnancy was 33  Obesity during  pregnancy is associated with numerous maternal and  risks which were discussed previously and reviewed.  See MFM note from 2025 for detailed review     Hypothyroidism has been associated with an increased risk of several complications which were discussed previously and reviewed.  See M note from 2025 for detailed review.  Thyroid:    Lab Results   Component Value Date    TSH 0.519 (L) 2025    TSH 0.710 2025    TSH 0.788 2024    T4F 1.1 2021    T4F 1.1 2020    T4F 0.9 2020          We discussed the recommended plan of care based on her  risk factors.  Airam and her significant other, Rafi, had their questions answered to their satisfaction.        IMPRESSION:  IUP at 32w5d  Normal fetal growth and BPP ultrasound  Class I obesity complicating pregnancy  Hypothyroidism, controlled     RECOMMENDATIONS:  Continue care with Dr. Jerome  She was advised to limit total weight gain to less than 20 pounds  Monitor TSH every 8 to 12 weeks or more frequently if her dose requires adjustment  Weekly NST at 36 weeks        Total time spent 20 minutes this calendar day which includes preparing to see the patient including chart review, obtaining and/or reviewing additional medical history, performing a physical exam and evaluation, documenting clinical information in the electronic medical record, independently interpreting results, counseling the patient, communicating results to the patient/family/caregiver and coordinating care.     Case discussed with patient who demonstrated understanding and agreement with plan.     Thank you for allowing me to participate in the care of this patient.  Please feel free to contact me with any questions.    Zena Reddy MD  Maternal-Fetal Medicine       Note to patient and family:  The 21st Century Cures Act makes medical notes available to patients in the interest of transparency.  However, please be advised that this is a  medical document.  It is intended as a peer to peer communication.  It is written in medical language and may contain abbreviations or verbiage that are technical and unfamiliar.  It may appear blunt or direct.  Medical documents are intended to carry relevant information, facts as evident, and the clinical opinion of the practitioner.

## 2025-05-16 ENCOUNTER — ULTRASOUND ENCOUNTER (OUTPATIENT)
Dept: PERINATAL CARE | Facility: HOSPITAL | Age: 35
End: 2025-05-16
Attending: OBSTETRICS & GYNECOLOGY
Payer: COMMERCIAL

## 2025-05-16 VITALS
DIASTOLIC BLOOD PRESSURE: 75 MMHG | SYSTOLIC BLOOD PRESSURE: 125 MMHG | WEIGHT: 205 LBS | BODY MASS INDEX: 35.87 KG/M2 | HEART RATE: 84 BPM | HEIGHT: 63.5 IN

## 2025-05-16 DIAGNOSIS — O99.213: Primary | ICD-10-CM

## 2025-05-16 DIAGNOSIS — E06.3 HASHIMOTO'S THYROIDITIS: ICD-10-CM

## 2025-05-16 DIAGNOSIS — O99.213: ICD-10-CM

## 2025-05-16 PROCEDURE — 76816 OB US FOLLOW-UP PER FETUS: CPT | Performed by: OBSTETRICS & GYNECOLOGY

## 2025-05-16 PROCEDURE — 76819 FETAL BIOPHYS PROFIL W/O NST: CPT

## 2025-05-16 NOTE — PROGRESS NOTES
Pt here for growth ultrasound/BPP  + FM  PT states feeling occas (not daily) uterine cramping, denies vag bleeding and leaking fluid.

## 2025-05-23 ENCOUNTER — ROUTINE PRENATAL (OUTPATIENT)
Facility: CLINIC | Age: 35
End: 2025-05-23
Payer: COMMERCIAL

## 2025-05-23 VITALS
SYSTOLIC BLOOD PRESSURE: 122 MMHG | WEIGHT: 208.19 LBS | BODY MASS INDEX: 36.43 KG/M2 | HEIGHT: 63.5 IN | DIASTOLIC BLOOD PRESSURE: 72 MMHG | HEART RATE: 87 BPM

## 2025-05-23 DIAGNOSIS — E03.9 HYPOTHYROIDISM AFFECTING PREGNANCY IN THIRD TRIMESTER (HCC): ICD-10-CM

## 2025-05-23 DIAGNOSIS — Z34.80 PRENATAL CARE, SUBSEQUENT PREGNANCY, ANTEPARTUM (HCC): Primary | ICD-10-CM

## 2025-05-23 DIAGNOSIS — Z3A.33 33 WEEKS GESTATION OF PREGNANCY (HCC): ICD-10-CM

## 2025-05-23 DIAGNOSIS — O99.283 HYPOTHYROIDISM AFFECTING PREGNANCY IN THIRD TRIMESTER (HCC): ICD-10-CM

## 2025-05-23 DIAGNOSIS — O36.60X0: ICD-10-CM

## 2025-05-23 PROCEDURE — 3008F BODY MASS INDEX DOCD: CPT | Performed by: STUDENT IN AN ORGANIZED HEALTH CARE EDUCATION/TRAINING PROGRAM

## 2025-05-23 PROCEDURE — 3074F SYST BP LT 130 MM HG: CPT | Performed by: STUDENT IN AN ORGANIZED HEALTH CARE EDUCATION/TRAINING PROGRAM

## 2025-05-23 PROCEDURE — 3078F DIAST BP <80 MM HG: CPT | Performed by: STUDENT IN AN ORGANIZED HEALTH CARE EDUCATION/TRAINING PROGRAM

## 2025-05-23 NOTE — PROGRESS NOTES
Kevin 33.5    +FM, no LOF, no ctx, no VB    EDC by LMP c/w 8w1d US   -Declines genetic testing  -1 hr GTT & CBC: done, elevated 1 hr --> 3 hr normal  -3rd tri HIV & Tpal: done   -Tdap done     Obesity  - HbA1C- 4.7%  - limit weight gain  - L2US- normal  - 05/16: EFW 2285 g ( 5 lb 1 oz); 63% AC 92 nd %ile. DANISHA is  11.2 cm.  MVP is 4.3 cm, BPP is 8/8.   Plan for 36 week US - order placed  - NST 36 weeks     2. Hypothyroidism  - levothyroxine 75 mcg  - TSH 0.710 on 1/6/25  - TSH 0.519 on 4/1  - will decrease synthroid 75 mcg to 5x/wk, recheck TSH in 1 month - reordered

## 2025-05-30 ENCOUNTER — LAB ENCOUNTER (OUTPATIENT)
Dept: LAB | Age: 35
End: 2025-05-30
Attending: STUDENT IN AN ORGANIZED HEALTH CARE EDUCATION/TRAINING PROGRAM
Payer: COMMERCIAL

## 2025-05-30 DIAGNOSIS — E03.9 HYPOTHYROIDISM AFFECTING PREGNANCY IN THIRD TRIMESTER (HCC): ICD-10-CM

## 2025-05-30 DIAGNOSIS — O99.283 HYPOTHYROIDISM AFFECTING PREGNANCY IN THIRD TRIMESTER (HCC): ICD-10-CM

## 2025-05-30 LAB — TSI SER-ACNC: 0.95 UIU/ML (ref 0.55–4.78)

## 2025-05-30 PROCEDURE — 84443 ASSAY THYROID STIM HORMONE: CPT | Performed by: STUDENT IN AN ORGANIZED HEALTH CARE EDUCATION/TRAINING PROGRAM

## 2025-06-06 ENCOUNTER — ROUTINE PRENATAL (OUTPATIENT)
Facility: CLINIC | Age: 35
End: 2025-06-06
Payer: COMMERCIAL

## 2025-06-06 VITALS
WEIGHT: 206 LBS | HEART RATE: 103 BPM | HEIGHT: 63.5 IN | SYSTOLIC BLOOD PRESSURE: 118 MMHG | BODY MASS INDEX: 36.05 KG/M2 | DIASTOLIC BLOOD PRESSURE: 68 MMHG

## 2025-06-06 DIAGNOSIS — Z3A.35 35 WEEKS GESTATION OF PREGNANCY (HCC): ICD-10-CM

## 2025-06-06 DIAGNOSIS — Z34.83 PRENATAL CARE, SUBSEQUENT PREGNANCY IN THIRD TRIMESTER (HCC): Primary | ICD-10-CM

## 2025-06-06 PROCEDURE — 3008F BODY MASS INDEX DOCD: CPT | Performed by: OBSTETRICS & GYNECOLOGY

## 2025-06-06 PROCEDURE — 3074F SYST BP LT 130 MM HG: CPT | Performed by: OBSTETRICS & GYNECOLOGY

## 2025-06-06 PROCEDURE — 3078F DIAST BP <80 MM HG: CPT | Performed by: OBSTETRICS & GYNECOLOGY

## 2025-06-06 NOTE — PROGRESS NOTES
35w5d    Patient has no complaints  - GBS and NST next visit    Discussed IOL at 39 weeks, will schedule next visit     EDC by LMP c/w 8w1d US   -Declines genetic testing  -1 hr GTT & CBC: done, elevated 1 hr --> 3 hr normal  -3rd tri HIV & Tpal: done   -Tdap done     Obesity  - HbA1C- 4.7%  - limit weight gain  - L2US- normal  - 05/16: EFW 2285 g ( 5 lb 1 oz); 63% AC 92 nd %ile. DANISHA is  11.2 cm.  MVP is 4.3 cm, BPP is 8/8.   Plan for 36 week US - scheduled next week  - NST 36 weeks     2. Hypothyroidism  - levothyroxine 75 mcg  - TSH 0.710 on 1/6/25  - TSH 0.519 on 4/1  - decreased synthroid 75 mcg to 5x/wk on 4/25  recheck TSH 5/30/25 - 0.949     3

## 2025-06-13 ENCOUNTER — ULTRASOUND ENCOUNTER (OUTPATIENT)
Facility: CLINIC | Age: 35
End: 2025-06-13
Payer: COMMERCIAL

## 2025-06-13 ENCOUNTER — ROUTINE PRENATAL (OUTPATIENT)
Facility: CLINIC | Age: 35
End: 2025-06-13
Payer: COMMERCIAL

## 2025-06-13 VITALS
SYSTOLIC BLOOD PRESSURE: 122 MMHG | BODY MASS INDEX: 36.4 KG/M2 | WEIGHT: 208 LBS | HEIGHT: 63.5 IN | DIASTOLIC BLOOD PRESSURE: 68 MMHG | HEART RATE: 77 BPM

## 2025-06-13 DIAGNOSIS — Z3A.36 36 WEEKS GESTATION OF PREGNANCY (HCC): ICD-10-CM

## 2025-06-13 DIAGNOSIS — Z34.83 PRENATAL CARE, SUBSEQUENT PREGNANCY IN THIRD TRIMESTER (HCC): ICD-10-CM

## 2025-06-13 DIAGNOSIS — Z34.80 PRENATAL CARE, SUBSEQUENT PREGNANCY, ANTEPARTUM (HCC): Primary | ICD-10-CM

## 2025-06-13 DIAGNOSIS — O36.60X0: ICD-10-CM

## 2025-06-13 DIAGNOSIS — O99.283 HYPOTHYROIDISM AFFECTING PREGNANCY IN THIRD TRIMESTER (HCC): ICD-10-CM

## 2025-06-13 DIAGNOSIS — O99.210 OTHER OBESITY DUE TO EXCESS CALORIES AFFECTING PREGNANCY, ANTEPARTUM (HCC): ICD-10-CM

## 2025-06-13 DIAGNOSIS — E03.9 HYPOTHYROIDISM AFFECTING PREGNANCY IN THIRD TRIMESTER (HCC): ICD-10-CM

## 2025-06-13 DIAGNOSIS — E66.09 OTHER OBESITY DUE TO EXCESS CALORIES AFFECTING PREGNANCY, ANTEPARTUM (HCC): ICD-10-CM

## 2025-06-13 PROCEDURE — 87081 CULTURE SCREEN ONLY: CPT | Performed by: STUDENT IN AN ORGANIZED HEALTH CARE EDUCATION/TRAINING PROGRAM

## 2025-06-13 PROCEDURE — 59025 FETAL NON-STRESS TEST: CPT | Performed by: STUDENT IN AN ORGANIZED HEALTH CARE EDUCATION/TRAINING PROGRAM

## 2025-06-13 PROCEDURE — 3078F DIAST BP <80 MM HG: CPT | Performed by: STUDENT IN AN ORGANIZED HEALTH CARE EDUCATION/TRAINING PROGRAM

## 2025-06-13 PROCEDURE — 87150 DNA/RNA AMPLIFIED PROBE: CPT | Performed by: STUDENT IN AN ORGANIZED HEALTH CARE EDUCATION/TRAINING PROGRAM

## 2025-06-13 PROCEDURE — 3074F SYST BP LT 130 MM HG: CPT | Performed by: STUDENT IN AN ORGANIZED HEALTH CARE EDUCATION/TRAINING PROGRAM

## 2025-06-13 PROCEDURE — 3008F BODY MASS INDEX DOCD: CPT | Performed by: STUDENT IN AN ORGANIZED HEALTH CARE EDUCATION/TRAINING PROGRAM

## 2025-06-13 NOTE — PROGRESS NOTES
36.5    +FM, no LOF, no VB, mild ctx  SVE: 1/50/-3  NST: reactive    Discussed IOL at 39 weeks, will schedule next visit     EDC by LMP c/w 8w1d US   -Declines genetic testing  -1 hr GTT & CBC: done, elevated 1 hr --> 3 hr normal  -3rd tri HIV & Tpal: done   -Tdap done  -GBS today     Obesity  - HbA1C- 4.7%  - limit weight gain  - L2US- normal  - 05/16: EFW 2285 g ( 5 lb 1 oz); 63% AC 92 nd %ile. DANISHA is  11.2 cm.  MVP is 4.3 cm, BPP is 8/8.   Plan for 36 week US - scheduled next week  - NST 36 weeks     2. Hypothyroidism  - levothyroxine 75 mcg  - TSH 0.710 on 1/6/25  - TSH 0.519 on 4/1  - decreased synthroid 75 mcg to 5x/wk on 4/25  recheck TSH 5/30/25 - 0.949

## 2025-06-15 LAB — GROUP B STREP BY PCR FOR PCR OVT: POSITIVE

## 2025-06-16 ENCOUNTER — MED REC SCAN ONLY (OUTPATIENT)
Facility: CLINIC | Age: 35
End: 2025-06-16

## 2025-06-17 ENCOUNTER — HOSPITAL ENCOUNTER (OUTPATIENT)
Facility: HOSPITAL | Age: 35
Discharge: HOME | End: 2025-06-18
Attending: STUDENT IN AN ORGANIZED HEALTH CARE EDUCATION/TRAINING PROGRAM | Admitting: STUDENT IN AN ORGANIZED HEALTH CARE EDUCATION/TRAINING PROGRAM
Payer: COMMERCIAL

## 2025-06-17 ENCOUNTER — TELEPHONE (OUTPATIENT)
Facility: CLINIC | Age: 35
End: 2025-06-17

## 2025-06-17 VITALS
TEMPERATURE: 98 F | RESPIRATION RATE: 16 BRPM | SYSTOLIC BLOOD PRESSURE: 127 MMHG | HEIGHT: 63.5 IN | WEIGHT: 208 LBS | HEART RATE: 85 BPM | DIASTOLIC BLOOD PRESSURE: 77 MMHG | BODY MASS INDEX: 36.4 KG/M2

## 2025-06-17 NOTE — TELEPHONE ENCOUNTER
Pt just called and said that she is experiencing Shortness of breath, chest pain since this morning. She is 37.2 wks. Please advise and call pt. Thanks

## 2025-06-17 NOTE — TELEPHONE ENCOUNTER
37 2/7 c/o chest pain and SHORT OF BREATH    Onset this am     Patient is at work at an [employer] facility in [place], felt centralized chest pain, some pressure, does not radiate anywhere. Per pt her pulse ox is 98% ; advised to go to ER or call 911 for evaluation. Patient verbalized understanding, agreed to and intend to comply with plan of care.

## 2025-06-18 PROCEDURE — 59025 FETAL NON-STRESS TEST: CPT

## 2025-06-18 PROCEDURE — 99213 OFFICE O/P EST LOW 20 MIN: CPT

## 2025-06-18 NOTE — PROGRESS NOTES
Pt is a 34 year old female admitted to [facility].     Chief Complaint   Patient presents with    R/o Labor      Pt is  37w2d intra-uterine pregnancy.  Pt c/o irregular ctx throughout the day that are now 8-10 minutes apart. Denies LOF, vaginal bleeding.     History obtained, oriented to room, staff, and plan of care.     EFM tested and applied, FHTs tracing and audible in 140s. Abdomen soft and non-tender.

## 2025-06-18 NOTE — DISCHARGE INSTRUCTIONS
Discharge Instructions    Diet: Regular  Activity: Normal activity         General Instructions    Call your OB doctor if: Fluid leaking from your vagina;Uterine contractions 10 minutes or closer for 1 to 2 hours;Uterine contractions increasing in intensity and frequency;Decrease in fetal movement;Vaginal bleeding;Temperature greater than 100F;Vaginal or rectal pressure  Early labor comfort measures: Drink fluids and eat small light meals;Relax, sleep, take a warm bath or shower for 30 minutes or less;Take a walk    Follow up at next scheduled appointment

## 2025-06-18 NOTE — NST
Nonstress Test   Patient: Shawanda Hernandez    Gestation: 37w3d    NST:       Variability: Moderate           Accelerations: Yes           Decelerations: None            Baseline: 135 BPM           Uterine Irritability: Yes           Contractions: Irregular                                        Acoustic Stimulator: No           Nonstress Test Interpretation: Reactive           Nonstress Test Second Interpretation: Reactive                     Additional Comments

## 2025-06-18 NOTE — PROGRESS NOTES
Discharge instructions given and reviewed, pt verbalizes understanding and agrees. Aware she is to return to L&D with worsening labor sx, otherwise f/u at next scheduled appointment. Pt ambulatory and discharged home accompanied by significant other.

## 2025-06-20 ENCOUNTER — TELEPHONE (OUTPATIENT)
Facility: CLINIC | Age: 35
End: 2025-06-20

## 2025-06-20 ENCOUNTER — ROUTINE PRENATAL (OUTPATIENT)
Facility: CLINIC | Age: 35
End: 2025-06-20
Payer: COMMERCIAL

## 2025-06-20 VITALS
WEIGHT: 208.19 LBS | DIASTOLIC BLOOD PRESSURE: 70 MMHG | HEART RATE: 95 BPM | SYSTOLIC BLOOD PRESSURE: 116 MMHG | BODY MASS INDEX: 36.43 KG/M2 | HEIGHT: 63.5 IN

## 2025-06-20 DIAGNOSIS — E66.09 OTHER OBESITY DUE TO EXCESS CALORIES AFFECTING PREGNANCY IN THIRD TRIMESTER (HCC): ICD-10-CM

## 2025-06-20 DIAGNOSIS — Z3A.37 37 WEEKS GESTATION OF PREGNANCY (HCC): ICD-10-CM

## 2025-06-20 DIAGNOSIS — O99.213 OTHER OBESITY DUE TO EXCESS CALORIES AFFECTING PREGNANCY IN THIRD TRIMESTER (HCC): ICD-10-CM

## 2025-06-20 DIAGNOSIS — Z34.83 ENCOUNTER FOR SUPERVISION OF OTHER NORMAL PREGNANCY IN THIRD TRIMESTER (HCC): Primary | ICD-10-CM

## 2025-06-20 NOTE — PROGRESS NOTES
NGUYỄN 37.5    +FM, no LOF, no VB, mild ctx  NST: reactive    EDC by LMP c/w 8w1d US   -Declines genetic testing  -1 hr GTT & CBC: done, elevated 1 hr --> 3 hr normal  -3rd tri HIV & Tpal: done   -Tdap done  -GBS positive  -Delivery planning: IOL scheduled 7/2 AM with pitocin      Obesity  - HbA1C- 4.7%  - limit weight gain  - L2US- normal  - 05/16: EFW 2285 g ( 5 lb 1 oz); 63% AC 92 nd %ile. DANISHA is  11.2 cm.  MVP is 4.3 cm, BPP is 8/8.   Plan for 36 week US: EFW 82%ile, AC>99%ile  - NST 36 weeks     2. Hypothyroidism  - levothyroxine 75 mcg  - TSH 0.710 on 1/6/25  - TSH 0.519 on 4/1  - decreased synthroid 75 mcg to 5x/wk on 4/25  recheck TSH 5/30/25 - 0.949    3. GBS positive   - ampicillin in labor

## 2025-06-20 NOTE — TELEPHONE ENCOUNTER
----- Message from Angie Watkins sent at 6/20/2025 11:42 AM CDT -----  Regarding: schedule induction  Can we please schedule induction in 39th week AM with pitocin (dx: obesity, suspected macrosomia)  PELON: 7/6/2025

## 2025-06-22 ENCOUNTER — APPOINTMENT (OUTPATIENT)
Dept: ULTRASOUND IMAGING | Facility: HOSPITAL | Age: 35
End: 2025-06-22
Attending: OBSTETRICS & GYNECOLOGY
Payer: COMMERCIAL

## 2025-06-22 ENCOUNTER — TELEPHONE (OUTPATIENT)
Facility: CLINIC | Age: 35
End: 2025-06-22

## 2025-06-22 ENCOUNTER — HOSPITAL ENCOUNTER (OUTPATIENT)
Facility: HOSPITAL | Age: 35
Discharge: HOME | End: 2025-06-22
Attending: OBSTETRICS & GYNECOLOGY | Admitting: OBSTETRICS & GYNECOLOGY
Payer: COMMERCIAL

## 2025-06-22 VITALS
HEART RATE: 90 BPM | RESPIRATION RATE: 16 BRPM | DIASTOLIC BLOOD PRESSURE: 79 MMHG | BODY MASS INDEX: 36.4 KG/M2 | TEMPERATURE: 98 F | SYSTOLIC BLOOD PRESSURE: 131 MMHG | HEIGHT: 63.5 IN | WEIGHT: 208 LBS

## 2025-06-22 DIAGNOSIS — Z3A.38 38 WEEKS GESTATION OF PREGNANCY (HCC): ICD-10-CM

## 2025-06-22 DIAGNOSIS — O36.8190 DECREASED FETAL MOVEMENT AFFECTING MANAGEMENT OF PREGNANCY, ANTEPARTUM, SINGLE OR UNSPECIFIED FETUS (HCC): Primary | ICD-10-CM

## 2025-06-22 DIAGNOSIS — N93.9 VAGINAL SPOTTING: ICD-10-CM

## 2025-06-22 PROBLEM — O09.893 SHORT INTERVAL BETWEEN PREGNANCIES AFFECTING PREGNANCY IN THIRD TRIMESTER, ANTEPARTUM (HCC): Status: ACTIVE | Noted: 2025-06-22

## 2025-06-22 PROBLEM — O36.8390 ANTEPARTUM VARIABLE DECELERATION (HCC): Status: ACTIVE | Noted: 2025-06-22

## 2025-06-22 PROBLEM — O99.283 HYPOTHYROIDISM AFFECTING PREGNANCY IN THIRD TRIMESTER (HCC): Status: ACTIVE | Noted: 2025-06-22

## 2025-06-22 PROBLEM — O36.63X0: Status: ACTIVE | Noted: 2025-06-22

## 2025-06-22 PROBLEM — E03.9 HYPOTHYROIDISM AFFECTING PREGNANCY IN THIRD TRIMESTER (HCC): Status: ACTIVE | Noted: 2025-06-22

## 2025-06-22 PROBLEM — O36.63X0: Status: ACTIVE | Noted: 2025-06-13

## 2025-06-22 PROBLEM — O99.820 GBS (GROUP B STREPTOCOCCUS CARRIER), +RV CULTURE, CURRENTLY PREGNANT (HCC): Status: ACTIVE | Noted: 2025-06-22

## 2025-06-22 PROCEDURE — 76819 FETAL BIOPHYS PROFIL W/O NST: CPT | Performed by: OBSTETRICS & GYNECOLOGY

## 2025-06-22 PROCEDURE — 99214 OFFICE O/P EST MOD 30 MIN: CPT

## 2025-06-22 PROCEDURE — 59025 FETAL NON-STRESS TEST: CPT

## 2025-06-22 PROCEDURE — 76818 FETAL BIOPHYS PROFILE W/NST: CPT | Performed by: OBSTETRICS & GYNECOLOGY

## 2025-06-22 NOTE — TELEPHONE ENCOUNTER
On call OB/GYN    34 year old  at 38w0d c/o red or brown blood tinged mucus since her cervical exam on Friday in the office on 25. It is currently 25. Says it is not pure blood. No leaking fluid. Intermittent contractions. FM has been less today.      Obesity (pre preg BMI 33), short interval, Fetus trending LGA with AC>99%, hypothyroidism, GBS+    Advised coming to L&D for evaluation due to decreased fetal movement.   Patient expressed understanding.     Viviane Eli MD     Diagnoses and all orders for this visit:    Decreased fetal movement affecting management of pregnancy, antepartum, single or unspecified fetus (HCC)    Vaginal spotting    38 weeks gestation of pregnancy (HCC)

## 2025-06-22 NOTE — PROGRESS NOTES
Pt  EDC 26 presents to L&D with c/o reddish brown mucous discharge since a cervical exam on Friday. Pt denies LOF, denies regular ctx. Pt also states decrease fetal movement since apx noon today. EFM applied. Marker given and explained to pt.

## 2025-06-23 NOTE — NST
2025  NST     35 year old  at 38w0d with Obesity (pre preg BMI 33), short interval, Fetus trending LGA with AC>99%, hypothyroidism, GBS+     Paged on call provider today to report red or brown blood tinged mucus since her cervical exam on Friday in the office on 25. It is currently 25. Says it is not pure blood. No leaking fluid. Intermittent contractions. FM has been less today. She was advised to come to L&D for evaluation.     Vitals:    25 1849 25 1900   BP:  131/79   Pulse:  90   Resp: 16    Temp: 97.8 °F (36.6 °C)    TempSrc: Oral    Weight: 208 lb (94.3 kg)    Height: 63.5\"       , mod variability, variable decel x 1, +accels   Olivia Lopez de Gutierrez irritability, occasional contraction  SVE deferred     NST reactive   Biophysical profile , cephalic. DANISHA 14.2 cm, cervix 3.3 cm.      Discharge home.     Viviane Eli MD

## 2025-06-23 NOTE — DISCHARGE INSTRUCTIONS
Discharge Instructions    Diet: regular  Activity: Normal activity         General Instructions    Call your OB doctor if: Fluid leaking from your vagina;Uterine contractions 10 minutes or closer for 1 to 2 hours;Uterine contractions increasing in intensity and frequency;Decrease in fetal movement;Vaginal bleeding;Temperature greater than 100F;Vaginal or rectal pressure  Early labor comfort measures: Drink fluids and eat small light meals;Relax, sleep, take a warm bath or shower for 30 minutes or less;Take a walk

## 2025-06-23 NOTE — PROGRESS NOTES
Discharge instructions given to pt and discussed. Pt states no questions at this time, Verb understanding and agreeable to POC. Pt and  escorted out by this RN with instructions in hand in stable condition.

## 2025-06-23 NOTE — NST
Nonstress Test   Patient: Shawanda Hernandez    Gestation: 38w0d    NST:       Variability: Moderate           Accelerations: Yes           Decelerations: Variable (X1)            Baseline: 145 BPM           Uterine Irritability: Yes                                                    Acoustic Stimulator: No           Nonstress Test Interpretation: Reactive           Nonstress Test Second Interpretation: Reactive                     Additional Comments

## 2025-06-25 ENCOUNTER — TELEPHONE (OUTPATIENT)
Dept: OBGYN UNIT | Facility: HOSPITAL | Age: 35
End: 2025-06-25

## 2025-06-27 ENCOUNTER — ROUTINE PRENATAL (OUTPATIENT)
Facility: CLINIC | Age: 35
End: 2025-06-27
Payer: COMMERCIAL

## 2025-06-27 VITALS
DIASTOLIC BLOOD PRESSURE: 68 MMHG | WEIGHT: 209 LBS | BODY MASS INDEX: 36.57 KG/M2 | SYSTOLIC BLOOD PRESSURE: 118 MMHG | HEART RATE: 109 BPM | HEIGHT: 63.5 IN

## 2025-06-27 DIAGNOSIS — E66.09 OTHER OBESITY DUE TO EXCESS CALORIES AFFECTING PREGNANCY IN THIRD TRIMESTER (HCC): ICD-10-CM

## 2025-06-27 DIAGNOSIS — O09.529 SUPERVISION OF HIGH-RISK PREGNANCY OF ELDERLY MULTIGRAVIDA (>= 35 YEARS OLD AT TIME OF DELIVERY) (HCC): Primary | ICD-10-CM

## 2025-06-27 DIAGNOSIS — Z3A.38 38 WEEKS GESTATION OF PREGNANCY (HCC): ICD-10-CM

## 2025-06-27 DIAGNOSIS — O99.213 OTHER OBESITY DUE TO EXCESS CALORIES AFFECTING PREGNANCY IN THIRD TRIMESTER (HCC): ICD-10-CM

## 2025-06-27 PROCEDURE — 59025 FETAL NON-STRESS TEST: CPT | Performed by: STUDENT IN AN ORGANIZED HEALTH CARE EDUCATION/TRAINING PROGRAM

## 2025-06-27 PROCEDURE — 3074F SYST BP LT 130 MM HG: CPT | Performed by: STUDENT IN AN ORGANIZED HEALTH CARE EDUCATION/TRAINING PROGRAM

## 2025-06-27 PROCEDURE — 3008F BODY MASS INDEX DOCD: CPT | Performed by: STUDENT IN AN ORGANIZED HEALTH CARE EDUCATION/TRAINING PROGRAM

## 2025-06-27 PROCEDURE — 3078F DIAST BP <80 MM HG: CPT | Performed by: STUDENT IN AN ORGANIZED HEALTH CARE EDUCATION/TRAINING PROGRAM

## 2025-06-27 NOTE — PROGRESS NOTES
NGUYỄN 38.5    +FM, no LOF, no VB, mild ctx  NST: reactive  SVE done: 3-4/70/-3    EDC by LMP c/w 8w1d US   -Declines genetic testing  -1 hr GTT & CBC: done, elevated 1 hr --> 3 hr normal  -3rd tri HIV & Tpal: done   -Tdap done  -GBS positive  -Delivery planning: IOL scheduled 7/2 AM with pitocin - plan to start pitocin and amp and AROM after 4 hours of abx     Obesity  - HbA1C- 4.7%  - limit weight gain  - L2US- normal  - 05/16: EFW 2285 g ( 5 lb 1 oz); 63% AC 92 nd %ile. DANISHA is  11.2 cm.  MVP is 4.3 cm, BPP is 8/8.   Plan for 36 week US: EFW 82%ile, AC>99%ile  - NST 36 weeks     2. Hypothyroidism  - levothyroxine 75 mcg  - TSH 0.710 on 1/6/25  - TSH 0.519 on 4/1  - decreased synthroid 75 mcg to 5x/wk on 4/25  recheck TSH 5/30/25 - 0.949    3. GBS positive   - ampicillin in labor

## 2025-07-02 ENCOUNTER — HOSPITAL ENCOUNTER (INPATIENT)
Facility: HOSPITAL | Age: 35
LOS: 2 days | Discharge: HOME | End: 2025-07-04
Attending: STUDENT IN AN ORGANIZED HEALTH CARE EDUCATION/TRAINING PROGRAM | Admitting: STUDENT IN AN ORGANIZED HEALTH CARE EDUCATION/TRAINING PROGRAM
Payer: COMMERCIAL

## 2025-07-02 ENCOUNTER — ANESTHESIA (OUTPATIENT)
Dept: OBGYN UNIT | Facility: HOSPITAL | Age: 35
End: 2025-07-02
Payer: COMMERCIAL

## 2025-07-02 ENCOUNTER — APPOINTMENT (OUTPATIENT)
Dept: OBGYN CLINIC | Facility: HOSPITAL | Age: 35
End: 2025-07-02
Payer: COMMERCIAL

## 2025-07-02 ENCOUNTER — ANESTHESIA EVENT (OUTPATIENT)
Dept: OBGYN UNIT | Facility: HOSPITAL | Age: 35
End: 2025-07-02
Payer: COMMERCIAL

## 2025-07-02 PROBLEM — Z34.90 ENCOUNTER FOR INDUCTION OF LABOR (HCC): Status: ACTIVE | Noted: 2025-07-02

## 2025-07-02 PROBLEM — Z34.90 PREGNANCY (HCC): Status: ACTIVE | Noted: 2025-07-02

## 2025-07-02 LAB
ANTIBODY SCREEN: NEGATIVE
BASOPHILS # BLD AUTO: 0.02 X10(3) UL (ref 0–0.2)
BASOPHILS NFR BLD AUTO: 0.3 %
EOSINOPHIL # BLD AUTO: 0.04 X10(3) UL (ref 0–0.7)
EOSINOPHIL NFR BLD AUTO: 0.6 %
ERYTHROCYTE [DISTWIDTH] IN BLOOD BY AUTOMATED COUNT: 13.7 %
HCT VFR BLD AUTO: 38.5 % (ref 35–48)
HGB BLD-MCNC: 12.7 G/DL (ref 12–16)
IMM GRANULOCYTES # BLD AUTO: 0.03 X10(3) UL (ref 0–1)
IMM GRANULOCYTES NFR BLD: 0.4 %
LYMPHOCYTES # BLD AUTO: 1.9 X10(3) UL (ref 1–4)
LYMPHOCYTES NFR BLD AUTO: 26.8 %
MCH RBC QN AUTO: 28 PG (ref 26–34)
MCHC RBC AUTO-ENTMCNC: 33 G/DL (ref 31–37)
MCV RBC AUTO: 84.8 FL (ref 80–100)
MONOCYTES # BLD AUTO: 0.36 X10(3) UL (ref 0.1–1)
MONOCYTES NFR BLD AUTO: 5.1 %
NEUTROPHILS # BLD AUTO: 4.73 X10 (3) UL (ref 1.5–7.7)
NEUTROPHILS # BLD AUTO: 4.73 X10(3) UL (ref 1.5–7.7)
NEUTROPHILS NFR BLD AUTO: 66.8 %
PLATELET # BLD AUTO: 281 10(3)UL (ref 150–450)
RBC # BLD AUTO: 4.54 X10(6)UL (ref 3.8–5.3)
RH BLOOD TYPE: POSITIVE
T PALLIDUM AB SER QL IA: NONREACTIVE
WBC # BLD AUTO: 7.1 X10(3) UL (ref 4–11)

## 2025-07-02 PROCEDURE — 59400 OBSTETRICAL CARE: CPT | Performed by: STUDENT IN AN ORGANIZED HEALTH CARE EDUCATION/TRAINING PROGRAM

## 2025-07-02 PROCEDURE — 10907ZC DRAINAGE OF AMNIOTIC FLUID, THERAPEUTIC FROM PRODUCTS OF CONCEPTION, VIA NATURAL OR ARTIFICIAL OPENING: ICD-10-PCS | Performed by: STUDENT IN AN ORGANIZED HEALTH CARE EDUCATION/TRAINING PROGRAM

## 2025-07-02 PROCEDURE — 0KQM0ZZ REPAIR PERINEUM MUSCLE, OPEN APPROACH: ICD-10-PCS | Performed by: STUDENT IN AN ORGANIZED HEALTH CARE EDUCATION/TRAINING PROGRAM

## 2025-07-02 PROCEDURE — 3E033VJ INTRODUCTION OF OTHER HORMONE INTO PERIPHERAL VEIN, PERCUTANEOUS APPROACH: ICD-10-PCS | Performed by: STUDENT IN AN ORGANIZED HEALTH CARE EDUCATION/TRAINING PROGRAM

## 2025-07-02 RX ORDER — LEVOTHYROXINE SODIUM 75 UG/1
75 TABLET ORAL
Status: DISCONTINUED | OUTPATIENT
Start: 2025-07-03 | End: 2025-07-04

## 2025-07-02 RX ORDER — CHOLECALCIFEROL (VITAMIN D3) 25 MCG
1 TABLET,CHEWABLE ORAL DAILY
Status: DISCONTINUED | OUTPATIENT
Start: 2025-07-03 | End: 2025-07-04

## 2025-07-02 RX ORDER — SODIUM CHLORIDE 9 MG/ML
INJECTION, SOLUTION INTRAMUSCULAR; INTRAVENOUS; SUBCUTANEOUS
Status: DISPENSED
Start: 2025-07-02 | End: 2025-07-03

## 2025-07-02 RX ORDER — BUPIVACAINE HYDROCHLORIDE 2.5 MG/ML
30 INJECTION, SOLUTION EPIDURAL; INFILTRATION; INTRACAUDAL; PERINEURAL AS NEEDED
Status: DISCONTINUED | OUTPATIENT
Start: 2025-07-02 | End: 2025-07-02

## 2025-07-02 RX ORDER — CITRIC ACID/SODIUM CITRATE 334-500MG
30 SOLUTION, ORAL ORAL AS NEEDED
Status: DISCONTINUED | OUTPATIENT
Start: 2025-07-02 | End: 2025-07-02

## 2025-07-02 RX ORDER — LIDOCAINE HYDROCHLORIDE AND EPINEPHRINE 15; 5 MG/ML; UG/ML
INJECTION, SOLUTION EPIDURAL AS NEEDED
Status: DISCONTINUED | OUTPATIENT
Start: 2025-07-02 | End: 2025-07-02 | Stop reason: SURG

## 2025-07-02 RX ORDER — SIMETHICONE 80 MG
80 TABLET,CHEWABLE ORAL 3 TIMES DAILY PRN
Status: DISCONTINUED | OUTPATIENT
Start: 2025-07-02 | End: 2025-07-04

## 2025-07-02 RX ORDER — LIDOCAINE HYDROCHLORIDE 20 MG/ML
5 INJECTION, SOLUTION EPIDURAL; INFILTRATION; INTRACAUDAL; PERINEURAL AS NEEDED
Status: DISCONTINUED | OUTPATIENT
Start: 2025-07-02 | End: 2025-07-02

## 2025-07-02 RX ORDER — LIDOCAINE HYDROCHLORIDE AND EPINEPHRINE 15; 5 MG/ML; UG/ML
5 INJECTION, SOLUTION EPIDURAL AS NEEDED
Status: DISCONTINUED | OUTPATIENT
Start: 2025-07-02 | End: 2025-07-02

## 2025-07-02 RX ORDER — BUPIVACAINE HCL/0.9 % NACL/PF 0.25 %
PLASTIC BAG, INJECTION (ML) EPIDURAL
Status: DISPENSED
Start: 2025-07-02 | End: 2025-07-03

## 2025-07-02 RX ORDER — BUPIVACAINE HYDROCHLORIDE 2.5 MG/ML
INJECTION, SOLUTION EPIDURAL; INFILTRATION; INTRACAUDAL; PERINEURAL AS NEEDED
Status: DISCONTINUED | OUTPATIENT
Start: 2025-07-02 | End: 2025-07-02 | Stop reason: SURG

## 2025-07-02 RX ORDER — TERBUTALINE SULFATE 1 MG/ML
0.25 INJECTION SUBCUTANEOUS AS NEEDED
Status: DISCONTINUED | OUTPATIENT
Start: 2025-07-02 | End: 2025-07-02

## 2025-07-02 RX ORDER — ACETAMINOPHEN 500 MG
1000 TABLET ORAL EVERY 6 HOURS PRN
Status: DISCONTINUED | OUTPATIENT
Start: 2025-07-02 | End: 2025-07-04

## 2025-07-02 RX ORDER — IBUPROFEN 600 MG/1
600 TABLET, FILM COATED ORAL EVERY 6 HOURS
Status: DISCONTINUED | OUTPATIENT
Start: 2025-07-02 | End: 2025-07-04

## 2025-07-02 RX ORDER — SODIUM CHLORIDE 9 MG/ML
10 INJECTION, SOLUTION INTRAMUSCULAR; INTRAVENOUS; SUBCUTANEOUS AS NEEDED
Status: DISCONTINUED | OUTPATIENT
Start: 2025-07-02 | End: 2025-07-02

## 2025-07-02 RX ORDER — SODIUM CHLORIDE, SODIUM LACTATE, POTASSIUM CHLORIDE, CALCIUM CHLORIDE 600; 310; 30; 20 MG/100ML; MG/100ML; MG/100ML; MG/100ML
INJECTION, SOLUTION INTRAVENOUS CONTINUOUS
Status: DISCONTINUED | OUTPATIENT
Start: 2025-07-02 | End: 2025-07-02

## 2025-07-02 RX ORDER — IBUPROFEN 600 MG/1
600 TABLET, FILM COATED ORAL ONCE AS NEEDED
Status: DISCONTINUED | OUTPATIENT
Start: 2025-07-02 | End: 2025-07-02

## 2025-07-02 RX ORDER — ROPIVACAINE HYDROCHLORIDE 5 MG/ML
30 INJECTION, SOLUTION EPIDURAL; INFILTRATION; PERINEURAL AS NEEDED
Status: DISCONTINUED | OUTPATIENT
Start: 2025-07-02 | End: 2025-07-02

## 2025-07-02 RX ORDER — BUPIVACAINE HCL/0.9 % NACL/PF 0.25 %
5 PLASTIC BAG, INJECTION (ML) EPIDURAL AS NEEDED
Status: DISCONTINUED | OUTPATIENT
Start: 2025-07-02 | End: 2025-07-02

## 2025-07-02 RX ORDER — DOCUSATE SODIUM 100 MG/1
100 CAPSULE, LIQUID FILLED ORAL
Status: DISCONTINUED | OUTPATIENT
Start: 2025-07-03 | End: 2025-07-04

## 2025-07-02 RX ORDER — AMMONIA 15 % (W/V)
0.3 AMPUL (EA) INHALATION AS NEEDED
Status: DISCONTINUED | OUTPATIENT
Start: 2025-07-02 | End: 2025-07-04

## 2025-07-02 RX ORDER — BISACODYL 10 MG
10 SUPPOSITORY, RECTAL RECTAL ONCE AS NEEDED
Status: DISCONTINUED | OUTPATIENT
Start: 2025-07-02 | End: 2025-07-04

## 2025-07-02 RX ORDER — DEXTROSE, SODIUM CHLORIDE, SODIUM LACTATE, POTASSIUM CHLORIDE, AND CALCIUM CHLORIDE 5; .6; .31; .03; .02 G/100ML; G/100ML; G/100ML; G/100ML; G/100ML
INJECTION, SOLUTION INTRAVENOUS AS NEEDED
Status: DISCONTINUED | OUTPATIENT
Start: 2025-07-02 | End: 2025-07-02

## 2025-07-02 RX ORDER — ONDANSETRON 2 MG/ML
4 INJECTION INTRAMUSCULAR; INTRAVENOUS EVERY 6 HOURS PRN
Status: DISCONTINUED | OUTPATIENT
Start: 2025-07-02 | End: 2025-07-02

## 2025-07-02 RX ORDER — ACETAMINOPHEN 500 MG
500 TABLET ORAL EVERY 6 HOURS PRN
Status: DISCONTINUED | OUTPATIENT
Start: 2025-07-02 | End: 2025-07-04

## 2025-07-02 RX ORDER — ACETAMINOPHEN 500 MG
500 TABLET ORAL EVERY 6 HOURS PRN
Status: DISCONTINUED | OUTPATIENT
Start: 2025-07-02 | End: 2025-07-02

## 2025-07-02 RX ORDER — ACETAMINOPHEN 500 MG
1000 TABLET ORAL EVERY 6 HOURS PRN
Status: DISCONTINUED | OUTPATIENT
Start: 2025-07-02 | End: 2025-07-02

## 2025-07-02 RX ORDER — NALBUPHINE HYDROCHLORIDE 10 MG/ML
2.5 INJECTION INTRAMUSCULAR; INTRAVENOUS; SUBCUTANEOUS
Status: DISCONTINUED | OUTPATIENT
Start: 2025-07-02 | End: 2025-07-02

## 2025-07-02 RX ADMIN — BUPIVACAINE HYDROCHLORIDE 5 ML: 2.5 INJECTION, SOLUTION EPIDURAL; INFILTRATION; INTRACAUDAL; PERINEURAL at 20:31:00

## 2025-07-02 RX ADMIN — LIDOCAINE HYDROCHLORIDE AND EPINEPHRINE 5 ML: 15; 5 INJECTION, SOLUTION EPIDURAL at 20:30:00

## 2025-07-02 NOTE — PLAN OF CARE
Problem: BIRTH - VAGINAL/ SECTION  Goal: Fetal and maternal status remain reassuring during the birth process  Description: INTERVENTIONS:  - Monitor vital signs  - Monitor fetal heart rate  - Monitor uterine activity  - Monitor labor progression (vaginal delivery)  - DVT prophylaxis (C/S delivery)  - Surgical antibiotic prophylaxis (C/S delivery)  Outcome: Progressing     Problem: PAIN - ADULT  Goal: Verbalizes/displays adequate comfort level or patient's stated pain goal  Description: INTERVENTIONS:  - Encourage pt to monitor pain and request assistance  - Assess pain using appropriate pain scale  - Administer analgesics based on type and severity of pain and evaluate response  - Implement non-pharmacological measures as appropriate and evaluate response  - Consider cultural and social influences on pain and pain management  - Manage/alleviate anxiety  - Utilize distraction and/or relaxation techniques  - Monitor for opioid side effects  - Notify MD/LIP if interventions unsuccessful or patient reports new pain  - Anticipate increased pain with activity and pre-medicate as appropriate  Outcome: Progressing     Problem: ANXIETY  Goal: Will report anxiety at manageable levels  Description: INTERVENTIONS:  - Administer medication as ordered  - Teach and rehearse alternative coping skills  - Provide emotional support with 1:1 interaction with staff  Outcome: Progressing     Problem: Patient/Family Goals  Goal: Patient/Family Long Term Goal  Description: Patient's Long Term Goal: Safe and uncomplicated delivery    Interventions:  -  - See additional Care Plan goals for specific interventions  Outcome: Progressing  Goal: Patient/Family Short Term Goal  Description: Patient's Short Term Goal: adequate pain control     Interventions:     - See additional Care Plan goals for specific interventions  Outcome: Progressing

## 2025-07-02 NOTE — PROGRESS NOTES
Pt is a 35 year old female admitted to [facility].     Chief Complaint   Patient presents with    Scheduled Induction     Elective IOL      Pt is  39w3d intra-uterine pregnancy.  History obtained, consents signed. Oriented to room, staff, and plan of care.
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
## 2025-07-03 LAB
ALBUMIN SERPL-MCNC: 3.9 G/DL (ref 3.2–4.8)
ALBUMIN/GLOB SERPL: 1.4 {RATIO} (ref 1–2)
ALP LIVER SERPL-CCNC: 383 U/L (ref 37–98)
ALT SERPL-CCNC: 20 U/L (ref 10–49)
ANION GAP SERPL CALC-SCNC: 11 MMOL/L (ref 0–18)
AST SERPL-CCNC: 27 U/L (ref ?–34)
BASOPHILS # BLD AUTO: 0.02 X10(3) UL (ref 0–0.2)
BASOPHILS NFR BLD AUTO: 0.1 %
BILIRUB SERPL-MCNC: 0.4 MG/DL (ref 0.3–1.2)
BUN BLD-MCNC: 9 MG/DL (ref 9–23)
CALCIUM BLD-MCNC: 9.5 MG/DL (ref 8.7–10.6)
CHLORIDE SERPL-SCNC: 106 MMOL/L (ref 98–112)
CO2 SERPL-SCNC: 22 MMOL/L (ref 21–32)
CREAT BLD-MCNC: 0.7 MG/DL (ref 0.55–1.02)
CREAT UR-SCNC: 20.6 MG/DL
EGFRCR SERPLBLD CKD-EPI 2021: 116 ML/MIN/1.73M2 (ref 60–?)
EOSINOPHIL # BLD AUTO: 0.04 X10(3) UL (ref 0–0.7)
EOSINOPHIL NFR BLD AUTO: 0.3 %
ERYTHROCYTE [DISTWIDTH] IN BLOOD BY AUTOMATED COUNT: 13.8 %
GLOBULIN PLAS-MCNC: 2.8 G/DL (ref 2–3.5)
GLUCOSE BLD-MCNC: 127 MG/DL (ref 70–99)
HCT VFR BLD AUTO: 34.9 % (ref 35–48)
HGB BLD-MCNC: 11.6 G/DL (ref 12–16)
IMM GRANULOCYTES # BLD AUTO: 0.06 X10(3) UL (ref 0–1)
IMM GRANULOCYTES NFR BLD: 0.4 %
LYMPHOCYTES # BLD AUTO: 2.83 X10(3) UL (ref 1–4)
LYMPHOCYTES NFR BLD AUTO: 20.2 %
MCH RBC QN AUTO: 27.8 PG (ref 26–34)
MCHC RBC AUTO-ENTMCNC: 33.2 G/DL (ref 31–37)
MCV RBC AUTO: 83.5 FL (ref 80–100)
MONOCYTES # BLD AUTO: 1.1 X10(3) UL (ref 0.1–1)
MONOCYTES NFR BLD AUTO: 7.9 %
NEUTROPHILS # BLD AUTO: 9.93 X10 (3) UL (ref 1.5–7.7)
NEUTROPHILS # BLD AUTO: 9.93 X10(3) UL (ref 1.5–7.7)
NEUTROPHILS NFR BLD AUTO: 71.1 %
OSMOLALITY SERPL CALC.SUM OF ELEC: 288 MOSM/KG (ref 275–295)
PLATELET # BLD AUTO: 255 10(3)UL (ref 150–450)
POTASSIUM SERPL-SCNC: 4.1 MMOL/L (ref 3.5–5.1)
PROT SERPL-MCNC: 6.7 G/DL (ref 5.7–8.2)
PROT UR-MCNC: 14 MG/DL (ref ?–14)
PROT/CREAT UR-RTO: 0.68
RBC # BLD AUTO: 4.18 X10(6)UL (ref 3.8–5.3)
SODIUM SERPL-SCNC: 139 MMOL/L (ref 136–145)
WBC # BLD AUTO: 14 X10(3) UL (ref 4–11)

## 2025-07-03 NOTE — DISCHARGE INSTRUCTIONS
Hypertension related to pregnancy/postpartum    -Watch for symptoms of pre-eclampsia (severe or persistent headache not relieved with a dose of tylenol, visual disturbances, persistent or severe upper abdominal pain, shortness of breath, chest pain)  -Please obtain a blood pressure cuff for home from the list of [place] Blood Pressure Validated Device Listing- see www.validatebp.org  -Check blood pressure (and heart rate if you can) 2-3 times per day & more frequently if feeling poorly. Keep log & bring to visits.      If BP is 140/90 or higher (either number) you will likely be prescribed oral antihypertensive medication. Check blood pressure before a dose of medication. Can hold the medicine if you feel your blood pressure is getting too low (less than 110/70) or you are lightheaded.      If BP is 160/110 (either number) or higher, or you develop symptoms of pre-eclampsia, please immediately go to the emergency department at [facility] & let them know you may have pre-eclampsia. You will likely require IV antihypertensives and IV magnesium sulfate to prevent stroke and seizures.      Postpartum care: What to expect after a vaginal birth  When caring for a , you might forget to care for yourself. But that's important too. Learn what's involved as you recover from giving birth.  Pregnancy changes a body in more ways than you might expect. And that doesn't stop when you give birth. Here's what can happen physically and emotionally after a vaginal delivery.  Vaginal soreness  You might have had a tear in your vagina during delivery. Or your healthcare professional may have made a cut in the vaginal opening, called an episiotomy, to make delivery easier. The wound may hurt for a few weeks. Large tears can take longer to heal. To ease the pain:  Sit on a pillow or padded ring.  Cool the area with an ice pack. Or put a chilled witch hazel pad between a sanitary napkin and the area between your vaginal opening and  anus. That area is called the perineum.  Use a squirt bottle to spray warm water over the perineum as you urinate.  Sit in a warm bath just deep enough to cover your buttocks and hips for five minutes. Use cold water if it feels better.  Take a pain reliever that you can buy without a prescription. Ask your healthcare professional about a numbing spray or cream, if needed.  .Avoid straining due to constipation through adequate hydration and a diet that incorporates whole foods that are plant-based.  If this is not enough to keep your stools a toothpaste like consistency, please add over-the-counter fiber supplementation like Metamucil or a daily osmotic laxative like Miralax.  You can take one capful of Miralax with water or juice each morning and, as needed, in the evening.  While having a bowel movement, you can use can also use a stool under your feet or a squatty potty to help prevent straining.  Tell your healthcare professional if you have intense pain, lasting pain or if the pain gets worse. It could be a sign of an infection.    Vaginal discharge  After delivery, a mix of blood, mucus and tissue from the uterus comes out of the vagina. This is called discharge. The discharge changes color and lessens over 4 to 6 weeks after a baby is born. It starts bright red, then turns darker red. After that, it usually turns yellow or white. The discharge then slows and becomes watery until it stops.  Contact your healthcare professional if blood from your vagina soaks a pad hourly for two hours in a row, especially if you also have a fever, pelvic pain or tenderness.  Contractions  You might feel contractions, sometimes called afterpains, for a few days after delivery. These contractions often feel like menstrual cramps. They help keep you from bleeding too much because they put pressure on the blood vessels in the uterus. Afterpains are common during breastfeeding. That's because breastfeeding causes the release of the  hormone oxytocin.  To ease the pain, you can use acetaminophen (Tylenol, others) or ibuprofen (Advil, Motrin IB, others).  Leaking urine  Pregnancy, labor and a vaginal delivery can stretch or hurt your pelvic floor muscles. These muscles support the uterus, bladder and rectum. As a result, some urine might leak when you sneeze, laugh or cough. The leaking usually gets better within a week. But it might go on longer. Leaking urine also is called incontinence.  Until the leaking stops, wear sanitary pads. Do pelvic floor muscle training, also called Kegels, to tone your pelvic floor muscles and help control your bladder.  To do Kegels, think of sitting on a marble. Tighten your pelvic muscles as if you're lifting the marble. Try it for three seconds at a time, then relax for a count of three. Work up to doing the exercise 10 to 15 times in a row, at least three times a day. To make sure you're doing Kegels right, it might help to see a physical therapist who specializes in pelvic floor exercises.  Hemorrhoids and bowel movements  If you notice pain during bowel movements and feel swelling near your anus, you might have swollen veins in the anus or lower rectum, called hemorrhoids. To ease hemorrhoid pain:  Use a hemorrhoid cream or a medicine that you put into your anus, called a suppository, that has hydrocortisone. You can buy either without a prescription.  Wipe the area with pads that have witch hazel or a numbing agent.  Soak your anal area in plain warm water for 10 to 15 minutes 2 to 3 times a day.  You might be afraid to have a bowel movement because you don't want to make the pain of hemorrhoids or your episiotomy wound worse. Take steps to keep stools soft and regular. Eat foods high in fiber, including fruits, vegetables and whole grains. Drink plenty of water. Ask your healthcare professional about a stool softener, if needed.  Sore breasts  A few days after giving birth, you might have full, firm, sore  breasts. That's because your breast tissue overfills with milk, blood and other fluids. This condition is called engorgement. Breastfeed your baby often on both breasts to help keep them from overfilling.  If your breasts are engorged, your baby might have trouble attaching for breastfeeding. To help your baby latch on, you can use your hand or a breast pump to let out some breast milk before feeding your baby. That process is called expressing.  To ease sore breasts, put warm washcloths on them or take a warm shower before breastfeeding or expressing. That can make it easier for the milk to flow. Between feedings, put cold washcloths on your breasts. Pain relievers you can buy without a prescription might help too.  If you're not breastfeeding, wear a bra that supports your breasts, such as a sports bra. Don't pump your breasts or express the milk. That causes your breasts to make more milk. Putting ice packs on your breasts can ease discomfort. Pain relievers available without a prescription also can be helpful.  Hair loss and skin changes  During pregnancy, higher hormone levels mean your hair grows faster than it sheds. The result is more hair on your head. But for up to five months after giving birth, you lose more hair than you grow. This hair loss stops over time.  Stretch marks on the skin don't go away after delivery. But in time, they fade. Expect any skin that got darker during pregnancy, such as dark patches on your face, to fade slowly too.  Mood changes  Childbirth can trigger a lot of feelings. Many people have a period of feeling down or anxious after giving birth, sometimes called the baby blues. Symptoms include mood swings, crying spells, anxiety and trouble sleeping. These feelings often go away within two weeks. In the meantime, take good care of yourself. Share your feelings, and ask your partner, loved ones or friends for help.  If you have large mood swings, don't feel like eating, are very  tired and lack hardik in life shortly after childbirth, you might have postpartum depression. Contact your healthcare professional if you think you might be depressed. Be sure to seek help if:  Your symptoms don't go away on their own.  You have trouble caring for your baby.  You have a hard time doing daily tasks.  You think of harming yourself or your baby.    Medicines and counseling often can ease postpartum depression.  Please talk to your provider if you are interested in either of these treatments.  Weight loss  It's common to still look pregnant after giving birth. Most people lose about 13 pounds (6 kilograms) during delivery. This loss includes the weight of the baby, placenta and amniotic fluid.  In the days after delivery, you'll lose more weight from leftover fluids. After that, a healthy diet and regular exercise can help you to return to the weight you were before pregnancy.  Postpartum checkups  The American College of Obstetricians and Gynecologists says that postpartum care should be an ongoing process rather than a single visit after delivery. Check in with your healthcare professional within 2 to 3 weeks after delivery by phone or in person to talk about any issues you've had since giving birth.  Within 6 to 12 weeks after delivery, see your healthcare professional for a complete postpartum exam. During this visit, your healthcare professional does a physical exam and checks your belly, vagina, cervix and uterus to see how well you're healing.  Things to talk about at this visit include:  Your mood and emotional well-being.  How well you're sleeping.  Other symptoms you might have, such as tiredness.  Birth control and birth spacing.  Baby care and feeding.  When you can start having sex again.  What you can do about pain with sex or not wanting to have sex.  How you're adjusting to life with a new baby.  This checkup is a chance for you and your healthcare professional to make sure you're OK. It's  also a time to get answers to questions you have about life after giving birth.

## 2025-07-03 NOTE — PLAN OF CARE
Problem: BIRTH - VAGINAL/ SECTION  Goal: Fetal and maternal status remain reassuring during the birth process  Description: INTERVENTIONS:  - Monitor vital signs  - Monitor fetal heart rate  - Monitor uterine activity  - Monitor labor progression (vaginal delivery)  - DVT prophylaxis (C/S delivery)  - Surgical antibiotic prophylaxis (C/S delivery)  Outcome: Completed     Problem: PAIN - ADULT  Goal: Verbalizes/displays adequate comfort level or patient's stated pain goal  Description: INTERVENTIONS:  - Encourage pt to monitor pain and request assistance  - Assess pain using appropriate pain scale  - Administer analgesics based on type and severity of pain and evaluate response  - Implement non-pharmacological measures as appropriate and evaluate response  - Consider cultural and social influences on pain and pain management  - Manage/alleviate anxiety  - Utilize distraction and/or relaxation techniques  - Monitor for opioid side effects  - Notify MD/LIP if interventions unsuccessful or patient reports new pain  - Anticipate increased pain with activity and pre-medicate as appropriate  Outcome: Completed     Problem: ANXIETY  Goal: Will report anxiety at manageable levels  Description: INTERVENTIONS:  - Administer medication as ordered  - Teach and rehearse alternative coping skills  - Provide emotional support with 1:1 interaction with staff  Outcome: Completed     Problem: Patient/Family Goals  Goal: Patient/Family Long Term Goal  Description: Patient's Long Term Goal: Safe and uncomplicated delivery    Interventions:  -  - See additional Care Plan goals for specific interventions  Outcome: Completed  Goal: Patient/Family Short Term Goal  Description: Patient's Short Term Goal: adequate pain control     Interventions:     - See additional Care Plan goals for specific interventions  Outcome: Completed     Problem: SAFETY ADULT - FALL  Goal: Free from fall injury  Description: INTERVENTIONS:  - Assess pt  frequently for physical needs  - Identify cognitive and physical deficits and behaviors that affect risk of falls.  - [place] fall precautions as indicated by assessment.  - Educate pt/family on patient safety including physical limitations  - Instruct pt to call for assistance with activity based on assessment  - Modify environment to reduce risk of injury  - Provide assistive devices as appropriate  - Consider OT/PT consult to assist with strengthening/mobility  - Encourage toileting schedule  Outcome: Completed

## 2025-07-03 NOTE — L&D DELIVERY NOTE
Darren Hernandez [[id number]]      Labor Events     labor?: No   steroids?: None  Antibiotics received during labor?: Yes  Antibiotics (enter # doses in comment): ampicillin (Comment: x2)  Rupture date/time: 2025 1800     Rupture type: AROM  Fluid color: Clear  Labor type: Induced Onset of Labor  Induction: Oxytocin, AROM  Indications for induction: Suspected LGA  Intrapartum & labor complications: Group B beta strep +, Variable decelerations       Labor Length    1st stage: 2h 25m  2nd stage: 0h 20m  3rd stage: 0h 04m       Labor Event Times    Labor onset date/time: 2025  Dilation complete date/time: 2025 2240  Start pushing date/time: 2025 22:56        Presentation    Presentation: Vertex  Position: Right Occiput Anterior       Operative Delivery    Operative Vaginal Delivery: N/A                Shoulder Dystocia    Shoulder Dystocia: N/A       Anesthesia    Method: Epidural               Delivery      Head delivery date/time: 2025 23:00:40   Delivery date/time:  25 23:00:50   Delivery type: Normal spontaneous vaginal delivery    Details:     Delivery location: delivery room  Delivery Room Temperature: 70       Delivery Providers    Delivering Clinician: Angie Watkins MD   Delivery personnel:  Provider Role   Odilia Dobbs RN Baby Nurse   Barbara Gray, RN Delivery Nurse             Cord    Vessels: 3 Vessels  Complications: None  Timed cord clamping: Yes  Time in sec: 50  Cord blood disposition: to lab  Gases sent?: No       Resuscitation    Method: None       [place] Measurements      Weight: 3990 g 8 lb 12.7 oz Length: 50.8 cm     Head circum.: 34 cm Chest circum.: 37 cm      Abdominal circum.: 34.5 cm           Placenta    Date/time: 2025 23:05  Removal: Spontaneous  Appearance: Intact  Disposition: held for future pathology       Apgars    Living status: Living   Apgar Scoring Key:    0 1 2    Skin color Blue or pale Acrocyanotic  Completely pink    Heart rate Absent <100 bpm >100 bpm    Reflex irritability No response Grimace Cry or active withdrawal    Muscle tone Limp Some flexion Active motion    Respiratory effort Absent Weak cry; hypoventilation Good, crying              1 Minute:  5 Minute:  10 Minute:  15 Minute:  20 Minute:      Skin color: 1  1       Heart rate: 2  2       Reflex irritablity: 2  2       Muscle tone: 2  2       Respiratory effort: 2  2       Total: 9  9          Apgars assigned by: NIKOLAY ELIZABETH RN.   disposition: with mother       Skin to Skin    Skin to skin initiated date/time: 2025 2300       Vaginal Count    Initial count RN: Marilee Garcia RN  Initial count Tech: Byriene, Jojo   Sponges   Sharps    Initial counts 11   0    Final counts 11   1    Final count RN: Barbara Gray RN  Final count MD: Angie Wtakins MD       Lacerations      Perineal lacerations: 2nd Repaired?: Yes     Vaginal laceration?: No      Cervical laceration?: No    Clitoral laceration?: No    Quantitative blood loss (mL): 65                Vaginal Delivery Note          [facility] Patient Status:  Inpatient    1990 MRN [id number]   [facility] LABOR & DELIVERY Attending Angie Watkins MD   Hosp Day # 0 PCP Jennifer Thakkar MD     Date of Delivery: 25    Pre Op Dx:  IUP at Term    Post Op Dx: Same - delivered    Op: Normal Spontaneous Vaginal Delivery    Surgeon: Angie Watkins MD      Anesthesia: Epidural      Indications:  Patient is a 35 year old  at 39w3d who presented for elective IOL, suspected LGA. Pitocin and ampicillin was initiated. AROM was performed after 2 hours of antibiotics. She received an epidural for pain management per patient request. She progressed to complete cervical dilation.     Findings:    Sex: male     Weight:3990g      Apgars: 9/9      Lacerations: 2nd perineal laceration  Nuchal: none      Procedure:  The patients was placed in the dorsolithotomy  position and prepped.  She was encouraged to push.  As the head was delivered in ERNESTINA position, the perineum was supported to decrease the risk of tearing. The shoulders rotated easily and delivery was completed without complication.  Bulb suction was performed.  The cord was doubly clamped then cut after 60 seconds of cord pulsation noted.  The baby was placed on the mother's abdomen at her request. The cord blood was sampled. IV Pitocin was initiated. Placenta delivered spontaneosly by uterine massage.  Uterus noted to be firm. Good hemostasis noted. The perineum, vaginal mucosa and cervix was then examined.     The 2nd degree perineal laceration repaired with 2-0 rapide vicryl.  Bleeding was minimal.  The patient was then moved to the supine position in stable condition.  Sponge and instrument counts were correct.    Complications:  None  QBL: 65 mL     Mother and infant in good condition.    Angie Watkins MD   [facility] - OBGYN        Note to patient and family   The 21st Century Cures Act makes medical notes available to patients in the interest of transparency.  However, please be advised that this is a medical document.  It is intended as [id number] communication.  It is written and medical language may contain abbreviations or verbiage that are technical and unfamiliar.  It may appear blunt or direct.  Medical documents are intended to carry relevant information, facts as evident, and the clinical opinion of the practitioner.

## 2025-07-03 NOTE — ANESTHESIA PREPROCEDURE EVALUATION
PRE-OP EVALUATION    Patient Name: Shawanda Hernandez    Admit Diagnosis: pregnanacy  Pregnancy (HCC)    Pre-op Diagnosis: * No pre-op diagnosis entered *        Anesthesia Procedure: LABOR ANALGESIA    * No surgeons found in log *    Pre-op vitals reviewed.  Temp: 97.6 °F (36.4 °C)  Pulse: 92  Resp: 18  BP: 129/69     Body mass index is 36.44 kg/m².    Current medications reviewed.  Hospital Medications:  Current Medications[1]    Outpatient Medications:   Prescriptions Prior to Admission[2]    Allergies: Sulfa antibiotics      Anesthesia Evaluation    Patient summary reviewed.    Anesthetic Complications           GI/Hepatic/Renal                                 Cardiovascular                (+) obesity  (+) hypertension                                     Endo/Other                                  Pulmonary                           Neuro/Psych                                    Past Surgical History[3]  Social Hx on file[4]  History   Drug Use No     Available pre-op labs reviewed.  Lab Results   Component Value Date    WBC 7.1 07/02/2025    RBC 4.54 07/02/2025    HGB 12.7 07/02/2025    HCT 38.5 07/02/2025    MCV 84.8 07/02/2025    MCH 28.0 07/02/2025    MCHC 33.0 07/02/2025    RDW 13.7 07/02/2025    .0 07/02/2025               Airway      Mallampati: II  Mouth opening: 3 FB  TM distance: 4 - 6 cm  Neck ROM: full Cardiovascular    Cardiovascular exam normal.  Rhythm: regular  Rate: normal     Dental             Pulmonary    Pulmonary exam normal.                 Other findings          ASA: 2 and emergent  Plan: epidural  NPO status verified and patient meets guidelines.          Plan/risks discussed with: patient and spouse            Present on Admission:  **None**               [1]  • lactated ringers infusion   Intravenous Continuous   • dextrose in lactated ringers 5% infusion   Intravenous PRN   • lactated ringers IV bolus 500 mL  500 mL Intravenous PRN   • acetaminophen (Tylenol Extra Strength) tab  500 mg  500 mg Oral Q6H PRN   • acetaminophen (Tylenol Extra Strength) tab 1,000 mg  1,000 mg Oral Q6H PRN   • ibuprofen (Motrin) tab 600 mg  600 mg Oral Once PRN   • ondansetron (Zofran) 4 MG/2ML injection 4 mg  4 mg Intravenous Q6H PRN   • oxyTOCIN in sodium chloride 0.9% (Pitocin) 30 Units/500mL infusion premix  62.5-900 froilan-units/min Intravenous Continuous   • terbutaline (Brethine) 1 MG/ML injection 0.25 mg  0.25 mg Subcutaneous PRN   • sodium citrate-citric acid (Bicitra) 500-334 MG/5ML oral solution 30 mL  30 mL Oral PRN   • ropivacaine (Naropin) 0.5% injection  30 mL Injection PRN   • [COMPLETED] ampicillin (Omnipen) 2 g in sodium chloride 0.9% 100 mL IVPB-RONALD  2 g Intravenous Once    Followed by   • ampicillin (Omnipen) 1 g in sodium chloride 0.9% 100mL IVPB-RONALD  1 g Intravenous Q4H   • oxyTOCIN in sodium chloride 0.9% (Pitocin) 30 Units/500mL infusion premix  0.5-20 froilan-units/min Intravenous Continuous   • lactated ringers IV bolus 1,000 mL  1,000 mL Intravenous Once   • fentaNYL-bupivacaine 2 mcg/mL-0.125% in sodium chloride 0.9% 100 mL EPIDURAL infusion premix  12 mL/hr Epidural Continuous   • [COMPLETED] fentaNYL (Sublimaze) 50 mcg/mL injection 100 mcg  100 mcg Epidural Once   • lidocaine 1.5%-EPINEPHrine 1:200,000 (Xylocaine-Epinephrine) injection  5 mL Injection PRN   • bupivacaine PF (Marcaine) 0.25% injection  30 mL Injection PRN   • lidocaine PF (Xylocaine-MPF) 2% injection  5 mL Injection PRN   • sodium chloride 0.9% PF injection 10 mL  10 mL Injection PRN   • ePHEDrine (PF) 25 MG/5 ML injection 5 mg  5 mg Intravenous PRN   • nalbuphine (Nubain) 10 mg/mL injection 2.5 mg  2.5 mg Intravenous Q15 Min PRN   • fentaNYL-bupivacaine in sodium chloride 0.9% 2 mcg/mL-0.125% EPIDURAL infusion premix       • fentaNYL (Sublimaze) 50 mcg/mL injection       • sodium chloride 0.9% PF 0.9% injection       • ePHEDrine (PF) 25 MG/5 ML injection       [2]  Medications Prior to Admission   Medication Sig  Dispense Refill Last Dose/Taking   • prenatal vitamin with DHA 27-0.8-228 MG Oral Cap Take 1 capsule by mouth in the morning.   7/1/2025   • Levothyroxine Sodium 75 MCG Oral Tab Take 1 tablet Mon - Sat and SKIP it on Sundays 78 tablet 3 7/2/2025   [3]  Past Surgical History:  Procedure Laterality Date   • Insert intrauterine device     • Remove intrauterine device     [4]  Social History  Socioeconomic History   • Marital status:    • Number of children: 0   Occupational History   • Occupation: Student and working   Tobacco Use   • Smoking status: Never   • Smokeless tobacco: Never   Vaping Use   • Vaping status: Never Used   Substance and Sexual Activity   • Alcohol use: Not Currently   • Drug use: No   • Sexual activity: Not Currently     Partners: Male   Other Topics Concern   • Caffeine Concern Yes   • Stress Concern No   • Weight Concern Yes   • Special Diet No   • Exercise No   • Seat Belt Yes

## 2025-07-03 NOTE — ANESTHESIA PROCEDURE NOTES
Labor Analgesia    Date/Time: 7/2/2025 8:24 PM    Performed by: Job Stein MD  Authorized by: Job Stein MD      General Information and Staff    Start Time:  7/2/2025 8:24 PM  End Time:  7/2/2025 8:30 PM  Anesthesiologist:  Job Stein MD  Performed by:  Anesthesiologist  Patient Location:  OB  Site Identification: surface landmarks    Reason for Block: labor epidural    Preanesthetic Checklist: patient identified, IV checked, risks and benefits discussed, monitors and equipment checked, pre-op evaluation, timeout performed, IV bolus, anesthesia consent and sterile technique used      Procedure Details    Patient Position:  Sitting  Prep: ChloraPrep    Monitoring:  Heart rate and continuous pulse ox  Approach:  Midline    Epidural Needle    Injection Technique:  CHANTALE saline  Needle Type:  Tuohy  Needle Gauge:  17 G  Needle Length:  3.375 in  Needle Insertion Depth:  5  Location:  L3-4    Spinal Needle      Catheter    Catheter Type:  End hole  Catheter Size:  19 G  Catheter at Skin Depth:  12  Test Dose:  Negative    Assessment    Sensory Level:  T8    Additional Comments

## 2025-07-03 NOTE — PROGRESS NOTES
Labor Analgesia Follow Up Note    Patient underwent epidural anesthesia for labor analgesia,    Placenta Date/Time: 7/2/2025 11:05 PM    Delivery Date/Time:: 7/2/2025  11:00 PM    /60 (BP Location: Left arm)   Pulse 77   Temp 97.9 °F (36.6 °C) (Oral)   Resp 18   Wt 94.8 kg (209 lb)   LMP 09/29/2024 (Exact Date)   SpO2 98%   Breastfeeding Yes   BMI 36.44 kg/m²     Assessment:  Patient seen and no apparent anesthesia related complications.    Thank you for asking us to participate in the care of your patient.

## 2025-07-03 NOTE — PROGRESS NOTES
OB progress note    35 year old  female PPD#1 s/p  viable male at 39w3d on 25 after IOL for suspected LGA infant & obesity. Also with hypothyroidism & GBS+. Diagnosed with pre-eclampsia without severe features during admission.     Elevated BP intermittently this admission  -no symptoms  -labs ok though will check UPC ratio - > later resulted as 0.68.   -meets criteria for pre-eclampsia without severe features.     Afebrile  Pain controlled  Rh+, GBS +  Ambulation encouraged  Circumcision for infant declines.   Disposition - should stay until tomorrow, needs BP monitoring.     Subjective: Pain controlled. Lochia normal. Eating, ambulating, voiding without difficulty. No other complaints. No headaches, vision changes, epigastric pain.     Vitals:    25 0030 25 0045 25 0100 25 0330   BP: 132/63 108/76 143/66 123/71   BP Location:    Right arm   Pulse: 96 101 95 77   Resp:    18   Temp:    98 °F (36.7 °C)   TempSrc:    Oral   SpO2:    98%   Weight:         Temp:  [97.6 °F (36.4 °C)-98.9 °F (37.2 °C)] 98 °F (36.7 °C)  Pulse:  [] 77  Resp:  [18-20] 18  BP: (107-167)/(63-95) 123/71  SpO2:  [98 %] 98 %      fentaNYL-bupivacaine in sodium chloride 0.9%        fentaNYL        levothyroxine  75 mcg Oral Daily @ 0700    prenatal vitamin with DHA  1 capsule Oral Daily    ibuprofen  600 mg Oral Q6H    docusate sodium  100 mg Oral BID@0600,1800       [unfilled]    Exam:  Gen A&O, NAD  CV RRR  Lungs CTAB  Abd soft, nontender, fundus firm under umbilicus  Ext nontender, trace edema    Recent Labs   Lab 25  0641   WBC 14.0*   HGB 11.6*   .0   NE 9.93*       Viviane Eli MD

## 2025-07-03 NOTE — PROGRESS NOTES
Admission Note.    Pt transferred from L&D via wheel chair. Significant other and infant at bedside. Pt  up at carla to bathroom, juany care done, and oriented to unit/ room. Call light within reach.

## 2025-07-04 ENCOUNTER — PATIENT MESSAGE (OUTPATIENT)
Facility: CLINIC | Age: 35
End: 2025-07-04

## 2025-07-04 VITALS
WEIGHT: 209 LBS | DIASTOLIC BLOOD PRESSURE: 56 MMHG | SYSTOLIC BLOOD PRESSURE: 112 MMHG | TEMPERATURE: 98 F | RESPIRATION RATE: 18 BRPM | BODY MASS INDEX: 36 KG/M2 | HEART RATE: 76 BPM | OXYGEN SATURATION: 98 %

## 2025-07-04 NOTE — PLAN OF CARE
Problem: POSTPARTUM  Goal: Long Term Goal:Experiences normal postpartum course  Description: INTERVENTIONS:  - Assess and monitor vital signs and lab values.  - Assess fundus and lochia.  - Provide ice/sitz baths for perineum discomfort.  - Monitor healing of incision/episiotomy/laceration, and assess for signs and symptoms of infection and hematoma.  - Assess bladder function and monitor for bladder distention.  - Provide/instruct/assist with pericare as needed.  - Provide VTE prophylaxis as needed.  - Monitor bowel function.  - Encourage ambulation and provide assistance as needed.  - Assess and monitor emotional status and provide social service/psych resources as needed.  - Utilize standard precautions and use personal protective equipment as indicated. Ensure aseptic care of all intravenous lines and invasive tubes/drains.  - Obtain immunization and exposure to communicable diseases history.  7/4/2025 0738 by Marva Black, RN  Outcome: Completed  7/3/2025 1746 by Marva Black, RN  Outcome: Progressing  Goal: Optimize infant feeding at the breast  Description: INTERVENTIONS:  - Initiate breast feeding within first hour after birth.   - Monitor effectiveness of current breast feeding efforts.  - Assess support systems available to mother/family.  - Identify cultural beliefs/practices regarding lactation, letdown techniques, maternal food preferences.  - Assess mother's knowledge and previous experience with breast feeding.  - Provide information as needed about early infant feeding cues (e.g., rooting, lip smacking, sucking fingers/hand) versus late cue of crying.  - Discuss/demonstrate breast feeding aids (e.g., infant sling, nursing footstool/pillows, and breast pumps).  - Encourage mother/other family members to express feelings/concerns, and actively listen.  - Educate father/SO about benefits of breast feeding and how to manage common lactation challenges.  - Recommend avoidance of specific  medications or substances incompatible with breast feeding.  - Assess and monitor for signs of nipple pain/trauma.  - Instruct and provide assistance with proper latch.  - Review techniques for milk expression (breast pumping) and storage of breast milk. Provide pumping equipment/supplies, instructions and assistance, as needed.  - Encourage rooming-in and breast feeding on demand.  - Encourage skin-to-skin contact.  - Provide LC support as needed.  - Assess for and manage engorgement.  - Provide breast feeding education handouts and information on community breast feeding support.   2025 by Marva Black, RN  Outcome: Completed  7/3/2025 1746 by Marva Black, RN  Outcome: Progressing  Goal: Establishment of adequate milk supply with medication/procedure interruptions  Description: INTERVENTIONS:  - Review techniques for milk expression (breast pumping).   - Provide pumping equipment/supplies, instructions, and assistance until it is safe to breastfeed infant.  2025 by Marva Black RN  Outcome: Completed  7/3/2025 1746 by Marva Black RN  Outcome: Progressing  Goal: Appropriate maternal -  bonding  Description: INTERVENTIONS:  - Assess caregiver- interactions.  - Assess caregiver's emotional status and coping mechanisms.  - Encourage caregiver to participate in  daily care.  - Assess support systems available to mother/family.  - Provide /case management support as needed.  2025 by Marva Black, RN  Outcome: Completed  7/3/2025 1746 by Marva Black RN  Outcome: Progressing

## 2025-07-04 NOTE — PROGRESS NOTES
Patient AOx4, up and about, VSS, seen by OB, ok to go home, postpartum care, meds and  care DC instructions reviewed and completed, and will make the follow up appointment as instructed, OB appot in 3-5 days for BP check , s/s of hypertension reviewed, significant other at bedside , very supportive, and answered questions , LC also worked with patient and instructions completed, patient verbalized and demonstrated understanding of instructions, and signed infant custody release electronically .  Patient up in wheelchair, going home with significant other , and baby in car seat, with their belongings

## 2025-07-04 NOTE — DISCHARGE SUMMARY
[facility]   part of [facility]    Discharge Summary    [facility] Patient Status:  Inpatient    1990 MRN [id number]   Location [facility] 1SW-J Attending Angie Watkins MD   Hosp Day # 2 PCP Jennifer Thakkar MD     Date of Admission: 2025    Date of Discharge: 2025     Admission Diagnoses:   IUP at 39w3d  Obesity  Suspected LGA fetus    Discharge Diagnosis:   Status post induction of labor  Status post normal spontaneous vaginal delivery at 39w3d   Pre-eclampsia without severe features.     Primary OB Clinician: Alcira Jerome Keeney, Hrvojevic     [facility] Course:      viable male at 39w3d on 25 after IOL for suspected LGA infant & obesity. Also with hypothyroidism & GBS+. Diagnosed with pre-eclampsia without severe features during admission.     Did not require antihypertensive medications postpartum     Discharge Plan:   Discharge Condition: Stable  Early Discharge:  NO    Discharge medications:     Discharge Medications        CONTINUE taking these medications        Instructions Prescription details   levothyroxine 75 MCG Tabs  Commonly known as: Synthroid      Take 1 tablet Mon - Sat and SKIP it on Sundays   Quantity: 78 tablet  Refills: 3     prenatal vitamin with DHA 27-0.8-228 MG Caps      Take 1 capsule by mouth in the morning.   Refills: 0                   Discharge Diet: As tolerated    Discharge Activity: Pelvic rest until cleared    Follow up:      Follow-up Information       Angie Watkins MD. Go on 2025.    Specialty: OBSTETRICS & GYNECOLOGY  Why: BP check in 3-5 days from hospital discharge  @12:45pm  Contact information:  [address]  [facility] [address]  [phone]               Angie Watkins MD. Go on 2025.    Specialty: OBSTETRICS & GYNECOLOGY  Why: 6wk post-partum appointment scheduled  @1:30pm  Contact information:  [address]  [facility]  [id number]  [phone]                                 Other Discharge Instructions:         Hypertension related to pregnancy/postpartum    -Watch for symptoms of pre-eclampsia (severe or persistent headache not relieved with a dose of tylenol, visual disturbances, persistent or severe upper abdominal pain, shortness of breath, chest pain)  -Please obtain a blood pressure cuff for home from the list of [place] Blood Pressure Validated Device Listing- see www.validatebp.org  -Check blood pressure (and heart rate if you can) 2-3 times per day & more frequently if feeling poorly. Keep log & bring to visits.      If BP is 140/90 or higher (either number) you will likely be prescribed oral antihypertensive medication. Check blood pressure before a dose of medication. Can hold the medicine if you feel your blood pressure is getting too low (less than 110/70) or you are lightheaded.      If BP is 160/110 (either number) or higher, or you develop symptoms of pre-eclampsia, please immediately go to the emergency department at [facility] & let them know you may have pre-eclampsia. You will likely require IV antihypertensives and IV magnesium sulfate to prevent stroke and seizures.      Postpartum care: What to expect after a vaginal birth  When caring for a , you might forget to care for yourself. But that's important too. Learn what's involved as you recover from giving birth.  Pregnancy changes a body in more ways than you might expect. And that doesn't stop when you give birth. Here's what can happen physically and emotionally after a vaginal delivery.  Vaginal soreness  You might have had a tear in your vagina during delivery. Or your healthcare professional may have made a cut in the vaginal opening, called an episiotomy, to make delivery easier. The wound may hurt for a few weeks. Large tears can take longer to heal. To ease the pain:  Sit on a pillow or padded ring.  Cool the area with an ice pack. Or put a  chilled witch hazel pad between a sanitary napkin and the area between your vaginal opening and anus. That area is called the perineum.  Use a squirt bottle to spray warm water over the perineum as you urinate.  Sit in a warm bath just deep enough to cover your buttocks and hips for five minutes. Use cold water if it feels better.  Take a pain reliever that you can buy without a prescription. Ask your healthcare professional about a numbing spray or cream, if needed.  .Avoid straining due to constipation through adequate hydration and a diet that incorporates whole foods that are plant-based.  If this is not enough to keep your stools a toothpaste like consistency, please add over-the-counter fiber supplementation like Metamucil or a daily osmotic laxative like Miralax.  You can take one capful of Miralax with water or juice each morning and, as needed, in the evening.  While having a bowel movement, you can use can also use a stool under your feet or a squatty potty to help prevent straining.  Tell your healthcare professional if you have intense pain, lasting pain or if the pain gets worse. It could be a sign of an infection.    Vaginal discharge  After delivery, a mix of blood, mucus and tissue from the uterus comes out of the vagina. This is called discharge. The discharge changes color and lessens over 4 to 6 weeks after a baby is born. It starts bright red, then turns darker red. After that, it usually turns yellow or white. The discharge then slows and becomes watery until it stops.  Contact your healthcare professional if blood from your vagina soaks a pad hourly for two hours in a row, especially if you also have a fever, pelvic pain or tenderness.  Contractions  You might feel contractions, sometimes called afterpains, for a few days after delivery. These contractions often feel like menstrual cramps. They help keep you from bleeding too much because they put pressure on the blood vessels in the uterus.  Afterpains are common during breastfeeding. That's because breastfeeding causes the release of the hormone oxytocin.  To ease the pain, you can use acetaminophen (Tylenol, others) or ibuprofen (Advil, Motrin IB, others).  Leaking urine  Pregnancy, labor and a vaginal delivery can stretch or hurt your pelvic floor muscles. These muscles support the uterus, bladder and rectum. As a result, some urine might leak when you sneeze, laugh or cough. The leaking usually gets better within a week. But it might go on longer. Leaking urine also is called incontinence.  Until the leaking stops, wear sanitary pads. Do pelvic floor muscle training, also called Kegels, to tone your pelvic floor muscles and help control your bladder.  To do Kegels, think of sitting on a marble. Tighten your pelvic muscles as if you're lifting the marble. Try it for three seconds at a time, then relax for a count of three. Work up to doing the exercise 10 to 15 times in a row, at least three times a day. To make sure you're doing Kegels right, it might help to see a physical therapist who specializes in pelvic floor exercises.  Hemorrhoids and bowel movements  If you notice pain during bowel movements and feel swelling near your anus, you might have swollen veins in the anus or lower rectum, called hemorrhoids. To ease hemorrhoid pain:  Use a hemorrhoid cream or a medicine that you put into your anus, called a suppository, that has hydrocortisone. You can buy either without a prescription.  Wipe the area with pads that have witch hazel or a numbing agent.  Soak your anal area in plain warm water for 10 to 15 minutes 2 to 3 times a day.  You might be afraid to have a bowel movement because you don't want to make the pain of hemorrhoids or your episiotomy wound worse. Take steps to keep stools soft and regular. Eat foods high in fiber, including fruits, vegetables and whole grains. Drink plenty of water. Ask your healthcare professional about a stool  softener, if needed.  Sore breasts  A few days after giving birth, you might have full, firm, sore breasts. That's because your breast tissue overfills with milk, blood and other fluids. This condition is called engorgement. Breastfeed your baby often on both breasts to help keep them from overfilling.  If your breasts are engorged, your baby might have trouble attaching for breastfeeding. To help your baby latch on, you can use your hand or a breast pump to let out some breast milk before feeding your baby. That process is called expressing.  To ease sore breasts, put warm washcloths on them or take a warm shower before breastfeeding or expressing. That can make it easier for the milk to flow. Between feedings, put cold washcloths on your breasts. Pain relievers you can buy without a prescription might help too.  If you're not breastfeeding, wear a bra that supports your breasts, such as a sports bra. Don't pump your breasts or express the milk. That causes your breasts to make more milk. Putting ice packs on your breasts can ease discomfort. Pain relievers available without a prescription also can be helpful.  Hair loss and skin changes  During pregnancy, higher hormone levels mean your hair grows faster than it sheds. The result is more hair on your head. But for up to five months after giving birth, you lose more hair than you grow. This hair loss stops over time.  Stretch marks on the skin don't go away after delivery. But in time, they fade. Expect any skin that got darker during pregnancy, such as dark patches on your face, to fade slowly too.  Mood changes  Childbirth can trigger a lot of feelings. Many people have a period of feeling down or anxious after giving birth, sometimes called the baby blues. Symptoms include mood swings, crying spells, anxiety and trouble sleeping. These feelings often go away within two weeks. In the meantime, take good care of yourself. Share your feelings, and ask your partner,  loved ones or friends for help.  If you have large mood swings, don't feel like eating, are very tired and lack hardik in life shortly after childbirth, you might have postpartum depression. Contact your healthcare professional if you think you might be depressed. Be sure to seek help if:  Your symptoms don't go away on their own.  You have trouble caring for your baby.  You have a hard time doing daily tasks.  You think of harming yourself or your baby.    Medicines and counseling often can ease postpartum depression.  Please talk to your provider if you are interested in either of these treatments.  Weight loss  It's common to still look pregnant after giving birth. Most people lose about 13 pounds (6 kilograms) during delivery. This loss includes the weight of the baby, placenta and amniotic fluid.  In the days after delivery, you'll lose more weight from leftover fluids. After that, a healthy diet and regular exercise can help you to return to the weight you were before pregnancy.  Postpartum checkups  The American College of Obstetricians and Gynecologists says that postpartum care should be an ongoing process rather than a single visit after delivery. Check in with your healthcare professional within 2 to 3 weeks after delivery by phone or in person to talk about any issues you've had since giving birth.  Within 6 to 12 weeks after delivery, see your healthcare professional for a complete postpartum exam. During this visit, your healthcare professional does a physical exam and checks your belly, vagina, cervix and uterus to see how well you're healing.  Things to talk about at this visit include:  Your mood and emotional well-being.  How well you're sleeping.  Other symptoms you might have, such as tiredness.  Birth control and birth spacing.  Baby care and feeding.  When you can start having sex again.  What you can do about pain with sex or not wanting to have sex.  How you're adjusting to life with a new  baby.  This checkup is a chance for you and your healthcare professional to make sure you're OK. It's also a time to get answers to questions you have about life after giving birth.          Viviane Eli MD

## 2025-07-07 ENCOUNTER — TELEPHONE (OUTPATIENT)
Dept: OBGYN UNIT | Facility: HOSPITAL | Age: 35
End: 2025-07-07

## 2025-07-07 ENCOUNTER — POSTPARTUM (OUTPATIENT)
Facility: CLINIC | Age: 35
End: 2025-07-07
Payer: COMMERCIAL

## 2025-07-07 VITALS
HEIGHT: 63.5 IN | SYSTOLIC BLOOD PRESSURE: 132 MMHG | BODY MASS INDEX: 35.17 KG/M2 | WEIGHT: 201 LBS | HEART RATE: 77 BPM | DIASTOLIC BLOOD PRESSURE: 86 MMHG

## 2025-07-07 DIAGNOSIS — O14.00: Primary | ICD-10-CM

## 2025-07-07 NOTE — PAYOR COMM NOTE
--------------  DISCHARGE REVIEW    Payor: [facility] LABOR FUND PPO  Subscriber #:  [id number]  Authorization Number: [id number]    Admit date: 25  Admit time:   2:41 PM  Discharge Date: 2025 11:10 AM     Admitting Physician: Angie Watkins MD  Attending Physician:  No att. providers found  Primary Care Physician: Jennifer Thakkar MD          Discharge Summary Notes        Discharge Summary signed by Viviane Eli MD at 2025  7:38 AM       Author: Viviane Eli MD Specialty: OBSTETRICS & GYNECOLOGY Author Type: Physician    Filed: 2025  7:38 AM Date of Service: 2025  7:21 AM Status: Signed    : Viviane Eli MD (Physician)           [facility]   part of [facility]    Discharge Summary    [facility] Patient Status:  Inpatient    1990 MRN [id number]   Location [facility] 1SW-J Attending Angie Watkins MD   Hosp Day # 2 PCP Jennifer Thakkar MD     Date of Admission: 2025    Date of Discharge: 2025     Admission Diagnoses:   IUP at 39w3d  Obesity  Suspected LGA fetus    Discharge Diagnosis:   Status post induction of labor  Status post normal spontaneous vaginal delivery at 39w3d   Pre-eclampsia without severe features.     Primary OB Clinician: Alcira Jerome Keeney, Hrvojevic     Hospital Course:      viable male at 39w3d on 25 after IOL for suspected LGA infant & obesity. Also with hypothyroidism & GBS+. Diagnosed with pre-eclampsia without severe features during admission.     Did not require antihypertensive medications postpartum     Discharge Plan:   Discharge Condition: Stable  Early Discharge:  NO    Discharge medications:     Discharge Medications        CONTINUE taking these medications        Instructions Prescription details   levothyroxine 75 MCG Tabs  Commonly known as: Synthroid      Take 1 tablet Mon - Sat and SKIP it on Sundays   Quantity: 78 tablet  Refills: 3     prenatal vitamin with DHA 27-0.8-228 MG  Caps      Take 1 capsule by mouth in the morning.   Refills: 0                   Discharge Diet: As tolerated    Discharge Activity: Pelvic rest until cleared    Follow up:      Follow-up Information       Angie Watkins MD. Go on 2025.    Specialty: OBSTETRICS & GYNECOLOGY  Why: BP check in 3-5 days from hospital discharge  @12:45pm  Contact information:  [address]  [facility] [address]  [phone]               Angie Watkins MD. Go on 2025.    Specialty: OBSTETRICS & GYNECOLOGY  Why: 6wk post-partum appointment scheduled  @1:30pm  Contact information:  [address]  [facility] [address]  [phone]                                 Other Discharge Instructions:         Hypertension related to pregnancy/postpartum    -Watch for symptoms of pre-eclampsia (severe or persistent headache not relieved with a dose of tylenol, visual disturbances, persistent or severe upper abdominal pain, shortness of breath, chest pain)  -Please obtain a blood pressure cuff for home from the list of [place] Blood Pressure Validated Device Listing- see www.validatebp.org  -Check blood pressure (and heart rate if you can) 2-3 times per day & more frequently if feeling poorly. Keep log & bring to visits.      If BP is 140/90 or higher (either number) you will likely be prescribed oral antihypertensive medication. Check blood pressure before a dose of medication. Can hold the medicine if you feel your blood pressure is getting too low (less than 110/70) or you are lightheaded.      If BP is 160/110 (either number) or higher, or you develop symptoms of pre-eclampsia, please immediately go to the emergency department at [facility] & let them know you may have pre-eclampsia. You will likely require IV antihypertensives and IV magnesium sulfate to prevent stroke and seizures.      Postpartum care: What to expect after a vaginal birth  When caring for a , you might  forget to care for yourself. But that's important too. Learn what's involved as you recover from giving birth.  Pregnancy changes a body in more ways than you might expect. And that doesn't stop when you give birth. Here's what can happen physically and emotionally after a vaginal delivery.  Vaginal soreness  You might have had a tear in your vagina during delivery. Or your healthcare professional may have made a cut in the vaginal opening, called an episiotomy, to make delivery easier. The wound may hurt for a few weeks. Large tears can take longer to heal. To ease the pain:  Sit on a pillow or padded ring.  Cool the area with an ice pack. Or put a chilled witch hazel pad between a sanitary napkin and the area between your vaginal opening and anus. That area is called the perineum.  Use a squirt bottle to spray warm water over the perineum as you urinate.  Sit in a warm bath just deep enough to cover your buttocks and hips for five minutes. Use cold water if it feels better.  Take a pain reliever that you can buy without a prescription. Ask your healthcare professional about a numbing spray or cream, if needed.  .Avoid straining due to constipation through adequate hydration and a diet that incorporates whole foods that are plant-based.  If this is not enough to keep your stools a toothpaste like consistency, please add over-the-counter fiber supplementation like Metamucil or a daily osmotic laxative like Miralax.  You can take one capful of Miralax with water or juice each morning and, as needed, in the evening.  While having a bowel movement, you can use can also use a stool under your feet or a squatty potty to help prevent straining.  Tell your healthcare professional if you have intense pain, lasting pain or if the pain gets worse. It could be a sign of an infection.    Vaginal discharge  After delivery, a mix of blood, mucus and tissue from the uterus comes out of the vagina. This is called discharge. The  discharge changes color and lessens over 4 to 6 weeks after a baby is born. It starts bright red, then turns darker red. After that, it usually turns yellow or white. The discharge then slows and becomes watery until it stops.  Contact your healthcare professional if blood from your vagina soaks a pad hourly for two hours in a row, especially if you also have a fever, pelvic pain or tenderness.  Contractions  You might feel contractions, sometimes called afterpains, for a few days after delivery. These contractions often feel like menstrual cramps. They help keep you from bleeding too much because they put pressure on the blood vessels in the uterus. Afterpains are common during breastfeeding. That's because breastfeeding causes the release of the hormone oxytocin.  To ease the pain, you can use acetaminophen (Tylenol, others) or ibuprofen (Advil, Motrin IB, others).  Leaking urine  Pregnancy, labor and a vaginal delivery can stretch or hurt your pelvic floor muscles. These muscles support the uterus, bladder and rectum. As a result, some urine might leak when you sneeze, laugh or cough. The leaking usually gets better within a week. But it might go on longer. Leaking urine also is called incontinence.  Until the leaking stops, wear sanitary pads. Do pelvic floor muscle training, also called Kegels, to tone your pelvic floor muscles and help control your bladder.  To do Kegels, think of sitting on a marble. Tighten your pelvic muscles as if you're lifting the marble. Try it for three seconds at a time, then relax for a count of three. Work up to doing the exercise 10 to 15 times in a row, at least three times a day. To make sure you're doing Kegels right, it might help to see a physical therapist who specializes in pelvic floor exercises.  Hemorrhoids and bowel movements  If you notice pain during bowel movements and feel swelling near your anus, you might have swollen veins in the anus or lower rectum, called  hemorrhoids. To ease hemorrhoid pain:  Use a hemorrhoid cream or a medicine that you put into your anus, called a suppository, that has hydrocortisone. You can buy either without a prescription.  Wipe the area with pads that have witch hazel or a numbing agent.  Soak your anal area in plain warm water for 10 to 15 minutes 2 to 3 times a day.  You might be afraid to have a bowel movement because you don't want to make the pain of hemorrhoids or your episiotomy wound worse. Take steps to keep stools soft and regular. Eat foods high in fiber, including fruits, vegetables and whole grains. Drink plenty of water. Ask your healthcare professional about a stool softener, if needed.  Sore breasts  A few days after giving birth, you might have full, firm, sore breasts. That's because your breast tissue overfills with milk, blood and other fluids. This condition is called engorgement. Breastfeed your baby often on both breasts to help keep them from overfilling.  If your breasts are engorged, your baby might have trouble attaching for breastfeeding. To help your baby latch on, you can use your hand or a breast pump to let out some breast milk before feeding your baby. That process is called expressing.  To ease sore breasts, put warm washcloths on them or take a warm shower before breastfeeding or expressing. That can make it easier for the milk to flow. Between feedings, put cold washcloths on your breasts. Pain relievers you can buy without a prescription might help too.  If you're not breastfeeding, wear a bra that supports your breasts, such as a sports bra. Don't pump your breasts or express the milk. That causes your breasts to make more milk. Putting ice packs on your breasts can ease discomfort. Pain relievers available without a prescription also can be helpful.  Hair loss and skin changes  During pregnancy, higher hormone levels mean your hair grows faster than it sheds. The result is more hair on your head. But for  up to five months after giving birth, you lose more hair than you grow. This hair loss stops over time.  Stretch marks on the skin don't go away after delivery. But in time, they fade. Expect any skin that got darker during pregnancy, such as dark patches on your face, to fade slowly too.  Mood changes  Childbirth can trigger a lot of feelings. Many people have a period of feeling down or anxious after giving birth, sometimes called the baby blues. Symptoms include mood swings, crying spells, anxiety and trouble sleeping. These feelings often go away within two weeks. In the meantime, take good care of yourself. Share your feelings, and ask your partner, loved ones or friends for help.  If you have large mood swings, don't feel like eating, are very tired and lack hardik in life shortly after childbirth, you might have postpartum depression. Contact your healthcare professional if you think you might be depressed. Be sure to seek help if:  Your symptoms don't go away on their own.  You have trouble caring for your baby.  You have a hard time doing daily tasks.  You think of harming yourself or your baby.    Medicines and counseling often can ease postpartum depression.  Please talk to your provider if you are interested in either of these treatments.  Weight loss  It's common to still look pregnant after giving birth. Most people lose about 13 pounds (6 kilograms) during delivery. This loss includes the weight of the baby, placenta and amniotic fluid.  In the days after delivery, you'll lose more weight from leftover fluids. After that, a healthy diet and regular exercise can help you to return to the weight you were before pregnancy.  Postpartum checkups  The American College of Obstetricians and Gynecologists says that postpartum care should be an ongoing process rather than a single visit after delivery. Check in with your healthcare professional within 2 to 3 weeks after delivery by phone or in person to talk about  any issues you've had since giving birth.  Within 6 to 12 weeks after delivery, see your healthcare professional for a complete postpartum exam. During this visit, your healthcare professional does a physical exam and checks your belly, vagina, cervix and uterus to see how well you're healing.  Things to talk about at this visit include:  Your mood and emotional well-being.  How well you're sleeping.  Other symptoms you might have, such as tiredness.  Birth control and birth spacing.  Baby care and feeding.  When you can start having sex again.  What you can do about pain with sex or not wanting to have sex.  How you're adjusting to life with a new baby.  This checkup is a chance for you and your healthcare professional to make sure you're OK. It's also a time to get answers to questions you have about life after giving birth.          Viviane Eli MD      Electronically signed by Viviane Eli MD on 7/4/2025  7:38 AM         REVIEWER COMMENTS
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
## 2025-08-12 ENCOUNTER — POSTPARTUM (OUTPATIENT)
Facility: CLINIC | Age: 35
End: 2025-08-12

## 2025-08-12 VITALS
DIASTOLIC BLOOD PRESSURE: 66 MMHG | SYSTOLIC BLOOD PRESSURE: 102 MMHG | HEART RATE: 72 BPM | WEIGHT: 190 LBS | BODY MASS INDEX: 33.25 KG/M2 | HEIGHT: 63.5 IN

## 2025-08-12 PROBLEM — O09.893 SHORT INTERVAL BETWEEN PREGNANCIES AFFECTING PREGNANCY IN THIRD TRIMESTER, ANTEPARTUM (HCC): Status: RESOLVED | Noted: 2025-06-22 | Resolved: 2025-08-12

## 2025-08-12 PROBLEM — O99.820 GBS (GROUP B STREPTOCOCCUS CARRIER), +RV CULTURE, CURRENTLY PREGNANT (HCC): Status: RESOLVED | Noted: 2025-06-22 | Resolved: 2025-08-12

## 2025-08-12 PROBLEM — O99.210 SEVERE OBESITY DUE TO EXCESS CALORIES AFFECTING PREGNANCY, ANTEPARTUM (HCC): Status: RESOLVED | Noted: 2024-11-25 | Resolved: 2025-08-12

## 2025-08-12 PROBLEM — O36.63X0: Status: RESOLVED | Noted: 2025-06-13 | Resolved: 2025-08-12

## 2025-08-12 PROBLEM — E03.9 HYPOTHYROIDISM AFFECTING PREGNANCY IN THIRD TRIMESTER (HCC): Status: RESOLVED | Noted: 2025-06-22 | Resolved: 2025-08-12

## 2025-08-12 PROBLEM — E66.01 SEVERE OBESITY DUE TO EXCESS CALORIES AFFECTING PREGNANCY, ANTEPARTUM (HCC): Status: RESOLVED | Noted: 2024-11-25 | Resolved: 2025-08-12

## 2025-08-12 PROBLEM — O36.8390 ANTEPARTUM VARIABLE DECELERATION (HCC): Status: RESOLVED | Noted: 2025-06-22 | Resolved: 2025-08-12

## 2025-08-12 PROBLEM — O99.283 HYPOTHYROIDISM AFFECTING PREGNANCY IN THIRD TRIMESTER (HCC): Status: RESOLVED | Noted: 2025-06-22 | Resolved: 2025-08-12

## 2025-08-12 PROBLEM — Z34.90 ENCOUNTER FOR INDUCTION OF LABOR (HCC): Status: RESOLVED | Noted: 2025-07-02 | Resolved: 2025-08-12

## 2025-08-12 PROBLEM — O36.8190 DECREASED FETAL MOVEMENTS AFFECTING MANAGEMENT OF MOTHER, ANTEPARTUM (HCC): Status: RESOLVED | Noted: 2025-06-22 | Resolved: 2025-08-12

## 2025-08-12 PROBLEM — Z34.90 PREGNANCY (HCC): Status: RESOLVED | Noted: 2025-07-02 | Resolved: 2025-08-12

## 2025-08-12 PROBLEM — Z3A.38 38 WEEKS GESTATION OF PREGNANCY (HCC): Status: RESOLVED | Noted: 2025-06-22 | Resolved: 2025-08-12

## 2025-08-12 PROCEDURE — 3078F DIAST BP <80 MM HG: CPT | Performed by: STUDENT IN AN ORGANIZED HEALTH CARE EDUCATION/TRAINING PROGRAM

## 2025-08-12 PROCEDURE — 3008F BODY MASS INDEX DOCD: CPT | Performed by: STUDENT IN AN ORGANIZED HEALTH CARE EDUCATION/TRAINING PROGRAM

## 2025-08-12 PROCEDURE — 3074F SYST BP LT 130 MM HG: CPT | Performed by: STUDENT IN AN ORGANIZED HEALTH CARE EDUCATION/TRAINING PROGRAM

## (undated) DIAGNOSIS — O09.299 HISTORY OF MISCARRIAGE, CURRENTLY PREGNANT: Primary | ICD-10-CM

## (undated) DIAGNOSIS — O09.299 HISTORY OF MISCARRIAGE, CURRENTLY PREGNANT: ICD-10-CM

## (undated) DIAGNOSIS — N91.1 SECONDARY AMENORRHEA: Primary | ICD-10-CM

## (undated) DIAGNOSIS — N91.2 AMENORRHEA: Primary | ICD-10-CM

## (undated) NOTE — LETTER
August 6, 2017    Satnam Dsouza  [address]      Dear Marielos Turk: The following are the results of your recent TB skin test. Please review the list of test results.   Your result is the value on the left; we have also supplied

## (undated) NOTE — MR AVS SNAPSHOT
[facility], [facility]  [address] [facility], [facility] [address] [id number] [phone]               Thank you for choosing us for your health care visit with ROSSY Anderson.   We are glad to serve you and happy to provide you with this sum TSH Thyroid Stimulating Hormone    Complete by: Feb 02, 2017 (Approximate)    Assoc Dx:  Dysfunctional uterine bleeding [N93.8]           US PELVIS EV W DOPPLER (CPT=93975/68260)    Complete by:   Feb 02, 2017 (Approximate)    Assoc Dx:  Dysfunctional [place] Eat plenty of protein, keep the fat content low Sugars:  sodas and sports drinks, candies and desserts   Eat plenty of low-fat dairy products High fat meats and dairy   Choose whole grain products Foods high in sodium   Water is best for hydration Fast tamara

## (undated) NOTE — LETTER
12/10/21        [place] [id number]      Dear Maria Ines Gaston,    [id number] records indicate that you have outstanding lab work and or testing that was ordered for you and has not yet been completed:  Orders Placed This Encounter        US

## (undated) NOTE — MR AVS SNAPSHOT
After Visit Summary   11/5/2020    Ladan Bucio    MRN: [id number]           Visit Information     Date & Time  11/5/2020 11:00 AM Provider  Jing Garrido MD Department  [facility] [address] [facility]  [facility]  Phone  [phone] If you receive a survey from [employer], please take a few minutes to complete it and provide feedback. We strive to deliver the best patient experience and are looking for ways to make improvements. Your feedback will help us do so.  For more information EMERGENCY ROOM Life-threatening emergencies needing immediate intervention at a hospital emergency room.  Average cost  $2,300*   *Cost varies based on your insurance coverage  For more information about hours, locations or appointment options available at

## (undated) NOTE — Clinical Note
Continue care with Dr. Matilde Lyons TSH each trimester or more frequently if her dose requires adjustment  She was advised to limit gestational weight gain to 20 pounds or less   Weekly NST at 36 weeks

## (undated) NOTE — LETTER
Date & Time: 2/13/2021, 2:11 AM  Patient: Sunday Vazquez  Encounter Provider(s):    Chuck Mcmillan DO       To Whom It May Concern:    Sunday Vazquez was seen and treated in our department on 2/12/2021.  She should not return to work until 2/15/202

## (undated) NOTE — LETTER
08/03/17    You will need to return to clinic in 48-72 hours to have results of TB test read. Please return to clinic on 08/05/2017 before 4:30 p.m or 8/6/2017 before 4:30 p.m to have your TB test read.     If you do not return during this time your sunny